# Patient Record
Sex: FEMALE | Race: BLACK OR AFRICAN AMERICAN | NOT HISPANIC OR LATINO
[De-identification: names, ages, dates, MRNs, and addresses within clinical notes are randomized per-mention and may not be internally consistent; named-entity substitution may affect disease eponyms.]

---

## 2018-03-06 PROBLEM — Z00.00 ENCOUNTER FOR PREVENTIVE HEALTH EXAMINATION: Status: ACTIVE | Noted: 2018-03-06

## 2018-04-05 ENCOUNTER — APPOINTMENT (OUTPATIENT)
Dept: OPHTHALMOLOGY | Facility: CLINIC | Age: 83
End: 2018-04-05
Payer: MEDICARE

## 2018-04-05 PROCEDURE — 92025 CPTRIZED CORNEAL TOPOGRAPHY: CPT

## 2018-04-05 PROCEDURE — 92004 COMPRE OPH EXAM NEW PT 1/>: CPT

## 2018-04-05 PROCEDURE — 92286 ANT SGM IMG I&R SPECLR MIC: CPT

## 2018-12-19 ENCOUNTER — APPOINTMENT (OUTPATIENT)
Dept: OPHTHALMOLOGY | Facility: CLINIC | Age: 83
End: 2018-12-19
Payer: MEDICARE

## 2018-12-19 PROCEDURE — 67820 REVISE EYELASHES: CPT | Mod: E1

## 2018-12-19 PROCEDURE — 92012 INTRM OPH EXAM EST PATIENT: CPT | Mod: 25

## 2019-06-19 ENCOUNTER — NON-APPOINTMENT (OUTPATIENT)
Age: 84
End: 2019-06-19

## 2019-06-19 ENCOUNTER — APPOINTMENT (OUTPATIENT)
Dept: OPHTHALMOLOGY | Facility: CLINIC | Age: 84
End: 2019-06-19
Payer: MEDICARE

## 2019-06-19 PROCEDURE — 92014 COMPRE OPH EXAM EST PT 1/>: CPT

## 2020-08-24 ENCOUNTER — INPATIENT (INPATIENT)
Facility: HOSPITAL | Age: 85
LOS: 4 days | Discharge: HOSPICE | DRG: 435 | End: 2020-08-29
Attending: INTERNAL MEDICINE | Admitting: INTERNAL MEDICINE
Payer: MEDICARE

## 2020-08-24 VITALS
WEIGHT: 154.98 LBS | TEMPERATURE: 98 F | HEIGHT: 62 IN | DIASTOLIC BLOOD PRESSURE: 84 MMHG | OXYGEN SATURATION: 98 % | RESPIRATION RATE: 18 BRPM | HEART RATE: 86 BPM | SYSTOLIC BLOOD PRESSURE: 134 MMHG

## 2020-08-24 DIAGNOSIS — R62.7 ADULT FAILURE TO THRIVE: ICD-10-CM

## 2020-08-24 DIAGNOSIS — D49.0 NEOPLASM OF UNSPECIFIED BEHAVIOR OF DIGESTIVE SYSTEM: ICD-10-CM

## 2020-08-24 DIAGNOSIS — E11.9 TYPE 2 DIABETES MELLITUS WITHOUT COMPLICATIONS: ICD-10-CM

## 2020-08-24 DIAGNOSIS — Z90.710 ACQUIRED ABSENCE OF BOTH CERVIX AND UTERUS: Chronic | ICD-10-CM

## 2020-08-24 LAB
ALBUMIN SERPL ELPH-MCNC: 3.9 G/DL — SIGNIFICANT CHANGE UP (ref 3.3–5)
ALP SERPL-CCNC: 49 U/L — SIGNIFICANT CHANGE UP (ref 40–120)
ALT FLD-CCNC: 8 U/L — LOW (ref 10–45)
ANION GAP SERPL CALC-SCNC: 17 MMOL/L — SIGNIFICANT CHANGE UP (ref 5–17)
APTT BLD: 33.4 SEC — SIGNIFICANT CHANGE UP (ref 27.5–35.5)
AST SERPL-CCNC: 16 U/L — SIGNIFICANT CHANGE UP (ref 10–40)
BASOPHILS # BLD AUTO: 0.03 K/UL — SIGNIFICANT CHANGE UP (ref 0–0.2)
BASOPHILS NFR BLD AUTO: 0.3 % — SIGNIFICANT CHANGE UP (ref 0–2)
BILIRUB SERPL-MCNC: 0.6 MG/DL — SIGNIFICANT CHANGE UP (ref 0.2–1.2)
BUN SERPL-MCNC: 15 MG/DL — SIGNIFICANT CHANGE UP (ref 7–23)
CALCIUM SERPL-MCNC: 9.6 MG/DL — SIGNIFICANT CHANGE UP (ref 8.4–10.5)
CHLORIDE SERPL-SCNC: 104 MMOL/L — SIGNIFICANT CHANGE UP (ref 96–108)
CO2 SERPL-SCNC: 23 MMOL/L — SIGNIFICANT CHANGE UP (ref 22–31)
CREAT SERPL-MCNC: 1.6 MG/DL — HIGH (ref 0.5–1.3)
EOSINOPHIL # BLD AUTO: 0.03 K/UL — SIGNIFICANT CHANGE UP (ref 0–0.5)
EOSINOPHIL NFR BLD AUTO: 0.3 % — SIGNIFICANT CHANGE UP (ref 0–6)
GLUCOSE BLDC GLUCOMTR-MCNC: 125 MG/DL — HIGH (ref 70–99)
GLUCOSE SERPL-MCNC: 133 MG/DL — HIGH (ref 70–99)
HCT VFR BLD CALC: 34.7 % — SIGNIFICANT CHANGE UP (ref 34.5–45)
HGB BLD-MCNC: 11.4 G/DL — LOW (ref 11.5–15.5)
IMM GRANULOCYTES NFR BLD AUTO: 0.2 % — SIGNIFICANT CHANGE UP (ref 0–1.5)
INR BLD: 1.05 RATIO — SIGNIFICANT CHANGE UP (ref 0.88–1.16)
LIDOCAIN IGE QN: 16 U/L — SIGNIFICANT CHANGE UP (ref 7–60)
LYMPHOCYTES # BLD AUTO: 1.81 K/UL — SIGNIFICANT CHANGE UP (ref 1–3.3)
LYMPHOCYTES # BLD AUTO: 20.7 % — SIGNIFICANT CHANGE UP (ref 13–44)
MAGNESIUM SERPL-MCNC: 1.9 MG/DL — SIGNIFICANT CHANGE UP (ref 1.6–2.6)
MCHC RBC-ENTMCNC: 31.8 PG — SIGNIFICANT CHANGE UP (ref 27–34)
MCHC RBC-ENTMCNC: 32.9 GM/DL — SIGNIFICANT CHANGE UP (ref 32–36)
MCV RBC AUTO: 96.7 FL — SIGNIFICANT CHANGE UP (ref 80–100)
MONOCYTES # BLD AUTO: 0.61 K/UL — SIGNIFICANT CHANGE UP (ref 0–0.9)
MONOCYTES NFR BLD AUTO: 7 % — SIGNIFICANT CHANGE UP (ref 2–14)
NEUTROPHILS # BLD AUTO: 6.23 K/UL — SIGNIFICANT CHANGE UP (ref 1.8–7.4)
NEUTROPHILS NFR BLD AUTO: 71.5 % — SIGNIFICANT CHANGE UP (ref 43–77)
NRBC # BLD: 0 /100 WBCS — SIGNIFICANT CHANGE UP (ref 0–0)
PHOSPHATE SERPL-MCNC: 3.1 MG/DL — SIGNIFICANT CHANGE UP (ref 2.5–4.5)
PLATELET # BLD AUTO: 225 K/UL — SIGNIFICANT CHANGE UP (ref 150–400)
POTASSIUM SERPL-MCNC: 3.4 MMOL/L — LOW (ref 3.5–5.3)
POTASSIUM SERPL-SCNC: 3.4 MMOL/L — LOW (ref 3.5–5.3)
PROT SERPL-MCNC: 6.1 G/DL — SIGNIFICANT CHANGE UP (ref 6–8.3)
PROTHROM AB SERPL-ACNC: 12.5 SEC — SIGNIFICANT CHANGE UP (ref 10.6–13.6)
RBC # BLD: 3.59 M/UL — LOW (ref 3.8–5.2)
RBC # FLD: 13.2 % — SIGNIFICANT CHANGE UP (ref 10.3–14.5)
SARS-COV-2 RNA SPEC QL NAA+PROBE: SIGNIFICANT CHANGE UP
SODIUM SERPL-SCNC: 144 MMOL/L — SIGNIFICANT CHANGE UP (ref 135–145)
WBC # BLD: 8.73 K/UL — SIGNIFICANT CHANGE UP (ref 3.8–10.5)
WBC # FLD AUTO: 8.73 K/UL — SIGNIFICANT CHANGE UP (ref 3.8–10.5)

## 2020-08-24 PROCEDURE — 93010 ELECTROCARDIOGRAM REPORT: CPT

## 2020-08-24 PROCEDURE — 99285 EMERGENCY DEPT VISIT HI MDM: CPT

## 2020-08-24 RX ORDER — LEVOTHYROXINE SODIUM 125 MCG
50 TABLET ORAL DAILY
Refills: 0 | Status: DISCONTINUED | OUTPATIENT
Start: 2020-08-24 | End: 2020-08-29

## 2020-08-24 RX ORDER — INSULIN LISPRO 100/ML
VIAL (ML) SUBCUTANEOUS
Refills: 0 | Status: DISCONTINUED | OUTPATIENT
Start: 2020-08-24 | End: 2020-08-29

## 2020-08-24 RX ORDER — GLUCAGON INJECTION, SOLUTION 0.5 MG/.1ML
1 INJECTION, SOLUTION SUBCUTANEOUS ONCE
Refills: 0 | Status: DISCONTINUED | OUTPATIENT
Start: 2020-08-24 | End: 2020-08-29

## 2020-08-24 RX ORDER — LOSARTAN POTASSIUM 100 MG/1
50 TABLET, FILM COATED ORAL DAILY
Refills: 0 | Status: DISCONTINUED | OUTPATIENT
Start: 2020-08-24 | End: 2020-08-29

## 2020-08-24 RX ORDER — SENNA PLUS 8.6 MG/1
2 TABLET ORAL AT BEDTIME
Refills: 0 | Status: DISCONTINUED | OUTPATIENT
Start: 2020-08-24 | End: 2020-08-29

## 2020-08-24 RX ORDER — DEXTROSE 50 % IN WATER 50 %
25 SYRINGE (ML) INTRAVENOUS ONCE
Refills: 0 | Status: DISCONTINUED | OUTPATIENT
Start: 2020-08-24 | End: 2020-08-29

## 2020-08-24 RX ORDER — SODIUM CHLORIDE 9 MG/ML
1000 INJECTION, SOLUTION INTRAVENOUS
Refills: 0 | Status: DISCONTINUED | OUTPATIENT
Start: 2020-08-24 | End: 2020-08-29

## 2020-08-24 RX ORDER — DILTIAZEM HCL 120 MG
240 CAPSULE, EXT RELEASE 24 HR ORAL DAILY
Refills: 0 | Status: DISCONTINUED | OUTPATIENT
Start: 2020-08-24 | End: 2020-08-29

## 2020-08-24 RX ORDER — DEXTROSE 50 % IN WATER 50 %
15 SYRINGE (ML) INTRAVENOUS ONCE
Refills: 0 | Status: DISCONTINUED | OUTPATIENT
Start: 2020-08-24 | End: 2020-08-29

## 2020-08-24 RX ORDER — HEPARIN SODIUM 5000 [USP'U]/ML
5000 INJECTION INTRAVENOUS; SUBCUTANEOUS EVERY 12 HOURS
Refills: 0 | Status: DISCONTINUED | OUTPATIENT
Start: 2020-08-24 | End: 2020-08-29

## 2020-08-24 RX ORDER — DEXTROSE 50 % IN WATER 50 %
12.5 SYRINGE (ML) INTRAVENOUS ONCE
Refills: 0 | Status: DISCONTINUED | OUTPATIENT
Start: 2020-08-24 | End: 2020-08-29

## 2020-08-24 NOTE — ED PROVIDER NOTE - ATTENDING CONTRIBUTION TO CARE
CLAUDINE I reviewed ACP note above. Patient presents with decreased PO intake and weight loss. Oupt US concerning for pancreatic lesion. Check Labs, CT to eval for malignancy. IVF and re eval.

## 2020-08-24 NOTE — ED ADULT NURSE NOTE - SUICIDE SCREENING QUESTION 2
Pt AOx4, ready to go home. Pt had a bout of shortness of breath this am but was managed w/breathing treatment and rest. Pt had been up to the bathroom about 5 times because of lasix and it was believed the she had over exerted herself. Respiratory came in, VS checked (wnl), doctor notified. Pt was feeling better within a half hour. Appts were made for the patient for follow up w/Dr. Mccall in [place], and through [facility].    No

## 2020-08-24 NOTE — ED PROVIDER NOTE - PHYSICAL EXAMINATION
A&Ox3, NAD. NCAT. PERRL, EOMI. Neck supple, no LAD. Lungs CTAB. +S1S2, RRR, No m/r/g. Abd soft, NT/ND, +BS, no rebound or guarding. Extremities: cap refill <2, pulses in distal extremities 4+, no edema. Skin without rash. CN II-XII intact. Strength 5/5 UE/LE. Sensations intact throughout.

## 2020-08-24 NOTE — CHART NOTE - NSCHARTNOTEFT_GEN_A_CORE
Medicine NP note    Call received from Dr Wolf to order MR abdomen with Iv contrast for patient to R/O Pancreatic neoplasm  Cr 1.6, Spoke with Radiologist, Cr 1.6, Mr abdomen with iv contrast ordered   F/U results  Will sign out to day team    Rochelle Springer [facility]  [id number]

## 2020-08-24 NOTE — ED ADULT TRIAGE NOTE - CHIEF COMPLAINT QUOTE
c/o decreased PO intake, n/v last 3 weeks. last BM this morning denies abd pain. lost 25lbs in 3 weeks

## 2020-08-24 NOTE — PATIENT PROFILE ADULT - NSTOBACCONEVERSMOKERY/N_GEN_A
Forwarded to Dr. Nichelle Canales.  
Patient would like to speak to Dr. Canales regarding the neurology  consult.  Patient did not elaborate.  
Talked to Ashley  She is seeing Dr Quintero 5/8/18 and was wondering if that was too late. She has no acute changes and I think she should be fine until then.  
No

## 2020-08-24 NOTE — ED PROVIDER NOTE - PROGRESS NOTE DETAILS
spoke with pts PCP Dr. Génesis Krause who states he was concerned for possible neoplasm of liver/pancreas. pt with wt loss of 40 lbs and not eating, was scheduled for abd MRI today however per pts son pt was too weak to make appointment. -Reina Rios PA-C Attending note (Tristan): patient found to have findings concerning for pancreatic neoplasm on CT; given new malignancy, poor po intake, will need admission for further medical management.

## 2020-08-24 NOTE — ED PROVIDER NOTE - OBJECTIVE STATEMENT
89 yo female with pmg htn, hypothyroidism, DM, presenting for concern of wt loss and decreased appetite x 4 weeks. Pt had out pt abdominal US with PMD Dr. Génesis Krause which was significant for hepatic and pancreatic lesions, was scheduled for abd MRI today however was too weak to make the appointment. Pt states she is only able to tolerate some water but no other food/liquid, has been generally constipated however had normal BM yesterday, denies abdominal pain, nausea or vomiting and is generally weak. no chest pain shortness of breath or bloody stools, no hx of EtOH or smoking.

## 2020-08-24 NOTE — ED ADULT NURSE NOTE - OBJECTIVE STATEMENT
89 yo f pmhx of HTN, HLD presents to the ED with c/o decreased po intake as well as feeling nauseous. PT reports for the past 4 weeks she has been experiencing decreased po intake, reports feeling weak, states decreased appetite, states she doesn't feel like eating, lost 25 pounds within the past 4 weeks. Reports she was supposed to get an MRI done but didn't go because she feels weak. Pt is A&Ox4, lung sound clear abd soft and non distended. Pt denies headache, dizziness, chest pain, palpitations, cough, SOB, abdominal pain, n/v/d, urinary symptoms, fevers, chills.

## 2020-08-24 NOTE — ED ADULT NURSE NOTE - NSIMPLEMENTINTERV_GEN_ALL_ED
Implemented All Fall Risk Interventions:  [place] to call system. Call bell, personal items and telephone within reach. Instruct patient to call for assistance. Room bathroom lighting operational. Non-slip footwear when patient is off stretcher. Physically safe environment: no spills, clutter or unnecessary equipment. Stretcher in lowest position, wheels locked, appropriate side rails in place. Provide visual cue, wrist band, yellow gown, etc. Monitor gait and stability. Monitor for mental status changes and reorient to person, place, and time. Review medications for side effects contributing to fall risk. Reinforce activity limits and safety measures with patient and family.

## 2020-08-25 LAB
A1C WITH ESTIMATED AVERAGE GLUCOSE RESULT: 12.6 % — HIGH (ref 4–5.6)
ALBUMIN SERPL ELPH-MCNC: 3.6 G/DL — SIGNIFICANT CHANGE UP (ref 3.3–5)
ALP SERPL-CCNC: 44 U/L — SIGNIFICANT CHANGE UP (ref 40–120)
ALT FLD-CCNC: 7 U/L — LOW (ref 10–45)
ANION GAP SERPL CALC-SCNC: 17 MMOL/L — SIGNIFICANT CHANGE UP (ref 5–17)
AST SERPL-CCNC: 15 U/L — SIGNIFICANT CHANGE UP (ref 10–40)
BILIRUB SERPL-MCNC: 0.5 MG/DL — SIGNIFICANT CHANGE UP (ref 0.2–1.2)
BUN SERPL-MCNC: 17 MG/DL — SIGNIFICANT CHANGE UP (ref 7–23)
CALCIUM SERPL-MCNC: 9.3 MG/DL — SIGNIFICANT CHANGE UP (ref 8.4–10.5)
CHLORIDE SERPL-SCNC: 105 MMOL/L — SIGNIFICANT CHANGE UP (ref 96–108)
CO2 SERPL-SCNC: 23 MMOL/L — SIGNIFICANT CHANGE UP (ref 22–31)
CREAT SERPL-MCNC: 1.54 MG/DL — HIGH (ref 0.5–1.3)
ESTIMATED AVERAGE GLUCOSE: 315 MG/DL — HIGH (ref 68–114)
GLUCOSE BLDC GLUCOMTR-MCNC: 111 MG/DL — HIGH (ref 70–99)
GLUCOSE BLDC GLUCOMTR-MCNC: 117 MG/DL — HIGH (ref 70–99)
GLUCOSE BLDC GLUCOMTR-MCNC: 122 MG/DL — HIGH (ref 70–99)
GLUCOSE BLDC GLUCOMTR-MCNC: 143 MG/DL — HIGH (ref 70–99)
GLUCOSE SERPL-MCNC: 130 MG/DL — HIGH (ref 70–99)
HCT VFR BLD CALC: 33.7 % — LOW (ref 34.5–45)
HGB BLD-MCNC: 11.1 G/DL — LOW (ref 11.5–15.5)
MCHC RBC-ENTMCNC: 32 PG — SIGNIFICANT CHANGE UP (ref 27–34)
MCHC RBC-ENTMCNC: 32.9 GM/DL — SIGNIFICANT CHANGE UP (ref 32–36)
MCV RBC AUTO: 97.1 FL — SIGNIFICANT CHANGE UP (ref 80–100)
NRBC # BLD: 0 /100 WBCS — SIGNIFICANT CHANGE UP (ref 0–0)
PLATELET # BLD AUTO: 187 K/UL — SIGNIFICANT CHANGE UP (ref 150–400)
POTASSIUM SERPL-MCNC: 2.8 MMOL/L — CRITICAL LOW (ref 3.5–5.3)
POTASSIUM SERPL-SCNC: 2.8 MMOL/L — CRITICAL LOW (ref 3.5–5.3)
PROT SERPL-MCNC: 5.6 G/DL — LOW (ref 6–8.3)
RBC # BLD: 3.47 M/UL — LOW (ref 3.8–5.2)
RBC # FLD: 13.2 % — SIGNIFICANT CHANGE UP (ref 10.3–14.5)
SODIUM SERPL-SCNC: 145 MMOL/L — SIGNIFICANT CHANGE UP (ref 135–145)
WBC # BLD: 7.76 K/UL — SIGNIFICANT CHANGE UP (ref 3.8–10.5)
WBC # FLD AUTO: 7.76 K/UL — SIGNIFICANT CHANGE UP (ref 3.8–10.5)

## 2020-08-25 PROCEDURE — 99222 1ST HOSP IP/OBS MODERATE 55: CPT | Mod: GC

## 2020-08-25 PROCEDURE — 74183 MRI ABD W/O CNTR FLWD CNTR: CPT | Mod: 26

## 2020-08-25 RX ORDER — POTASSIUM CHLORIDE 20 MEQ
40 PACKET (EA) ORAL ONCE
Refills: 0 | Status: COMPLETED | OUTPATIENT
Start: 2020-08-25 | End: 2020-08-25

## 2020-08-25 RX ADMIN — LOSARTAN POTASSIUM 50 MILLIGRAM(S): 100 TABLET, FILM COATED ORAL at 06:33

## 2020-08-25 RX ADMIN — HEPARIN SODIUM 5000 UNIT(S): 5000 INJECTION INTRAVENOUS; SUBCUTANEOUS at 05:58

## 2020-08-25 RX ADMIN — Medication 240 MILLIGRAM(S): at 05:58

## 2020-08-25 RX ADMIN — Medication 40 MILLIEQUIVALENT(S): at 08:53

## 2020-08-25 RX ADMIN — Medication 50 MICROGRAM(S): at 05:58

## 2020-08-25 RX ADMIN — HEPARIN SODIUM 5000 UNIT(S): 5000 INJECTION INTRAVENOUS; SUBCUTANEOUS at 18:42

## 2020-08-25 RX ADMIN — SENNA PLUS 2 TABLET(S): 8.6 TABLET ORAL at 21:42

## 2020-08-25 NOTE — CONSULT NOTE ADULT - SUBJECTIVE AND OBJECTIVE BOX
Chief Complaint: Weakness    HPI:    89 y/o F w/ hx of DM, HTN, hypothyroidism, and reported weight loss associated with decreased appetite, with pancreatic and hepatic lesions noted on outpatient abdominal U/S, who presented to [facility] for weawkness, with CT A/P with read of pancreatic tail mass; Advanced Endoscopy consulted for evaluation.    The patient endorses weight loss over the past several months along with loss of appetite. She feels a little better, however, continues to feel weak. She denies nausea/vomiting and abdominal pain. She denies yellowing of the skin and eyes.    Allergies:  No Known Allergies  Home Medications:  dilTIAZem 240 mg/24 hours oral capsule, extended release: 1 cap(s) orally once a day  glipiZIDE 2.5 mg oral tablet, extended release: 1 tab(s) orally once a day  losartan-hydrochlorothiazide 100 mg-25 mg oral tablet: 1 tab(s) orally once a day  metFORMIN 500 mg oral tablet: 1 tab(s) orally 2 times a day  ocular lubricant ophthalmic solution: 1 drop(s) to each affected eye 4 times a day, As Needed  Senexon-S 50 mg-8.6 mg oral tablet: 1 tab(s) orally 2 times a day  Synthroid 50 mcg (0.05 mg) oral tablet: 1 tab(s) orally once a day  Vitamin B-12 1000 mcg oral tablet: 1 tab(s) orally once a day    Hospital Medications:  artificial tears (preservative free) Ophthalmic Solution 1 Drop(s) Both EYES four times a day PRN  dextrose 40% Gel 15 Gram(s) Oral once PRN  dextrose 5%. 1000 milliLiter(s) IV Continuous <Continuous>  dextrose 50% Injectable 12.5 Gram(s) IV Push once  dextrose 50% Injectable 25 Gram(s) IV Push once  dextrose 50% Injectable 25 Gram(s) IV Push once  diltiazem    milliGRAM(s) Oral daily  glucagon  Injectable 1 milliGRAM(s) IntraMuscular once PRN  heparin   Injectable 5000 Unit(s) SubCutaneous every 12 hours  insulin lispro (HumaLOG) corrective regimen sliding scale   SubCutaneous three times a day before meals  levothyroxine 50 MICROGram(s) Oral daily  losartan 50 milliGRAM(s) Oral daily  senna 2 Tablet(s) Oral at bedtime    Allergies:   No Known Allergies    PMHX/PSHX:      Family history:      Denies family history of colon cancer/polyps, stomach cancer/polyps, pancreatic cancer/masses, liver cancer/disease, ovarian cancer and endometrial cancer.    Social History:     Tob: Denies  EtOH: Denies  Illicit Drugs: Denies    ROS:     General:  No wt loss, fevers, chills, night sweats, + fatigue  Eyes:  Good vision, no reported pain  ENT:  No sore throat, pain, runny nose, dysphagia  CV:  No pain, palpitations, hypo/hypertension  Pulm:  No dyspnea, cough, tachypnea, wheezing  GI:  No pain, No nausea, No vomiting, No diarrhea, No constipation, No weight loss, No fever, No pruritis, No bright red blood per rectum, No tarry stools, No dysphagia,  :  No pain, bleeding, incontinence, nocturia  Muscle:  No pain, weakness  Neuro:  No weakness, tingling, memory problems  Psych:  No fatigue, insomnia, mood problems, depression  Endocrine:  No polyuria, polydipsia, cold/heat intolerance  Heme:  No petechiae, ecchymosis, easy bruisability  Skin:  No rash, tattoos, scars, edema    PHYSICAL EXAM:     Vital Signs:  Vital Signs Last 24 Hrs  T(C): 36.9 (25 Aug 2020 04:27), Max: 37.2 (24 Aug 2020 21:33)  T(F): 98.5 (25 Aug 2020 04:27), Max: 98.9 (24 Aug 2020 21:33)  HR: 50 (25 Aug 2020 04:27) (50 - 87)  BP: 123/55 (25 Aug 2020 04:27) (123/55 - 158/78)  BP(mean): --  RR: 18 (25 Aug 2020 04:27) (16 - 18)  SpO2: 99% (25 Aug 2020 04:27) (97% - 100%)  Daily Height in cm: 154.94 (24 Aug 2020 20:25)    Daily Weight in k.8 (25 Aug 2020 07:36)    GENERAL:  No acute distress  HEENT:  Normocephalic/atraumatic, no scleral icterus  CHEST:  Clear to auscultation bilaterally, no wheezes/rales/ronchi, no accessory muscle use  HEART:  Regular rate and rhythm, no murmurs/rubs/gallops  ABDOMEN:  Soft, non-tender, non-distended, normoactive bowel sounds  EXTREMITIES: No cyanosis, clubbing, or edema  SKIN:  No rash/erythema  NEURO:  Alert and oriented x 3    LABS:                        11.1   7.76  )-----------( 187      ( 25 Aug 2020 06:44 )             33.7     Mean Cell Volume: 97.1 fl (-20 @ 06:44)        145  |  105  |  17  ----------------------------<  130<H>  2.8<LL>   |  23  |  1.54<H>    Ca    9.3      25 Aug 2020 06:44  Phos  3.1     08  Mg     1.9         TPro  5.6<L>  /  Alb  3.6  /  TBili  0.5  /  DBili  x   /  AST  15  /  ALT  7<L>  /  AlkPhos  44  25    LIVER FUNCTIONS - ( 25 Aug 2020 06:44 )  Alb: 3.6 g/dL / Pro: 5.6 g/dL / ALK PHOS: 44 U/L / ALT: 7 U/L / AST: 15 U/L / GGT: x           PT/INR - ( 24 Aug 2020 16:24 )   PT: 12.5 sec;   INR: 1.05 ratio         PTT - ( 24 Aug 2020 16:24 )  PTT:33.4 sec    Amylase Serum--      Lipase serum16       Ammonia--                          11.1   7.76  )-----------( 187      ( 25 Aug 2020 06:44 )             33.7                         11.4   8.73  )-----------( 225      ( 24 Aug 2020 16:24 )             34.7     Imaging:    < from: CT Abdomen and Pelvis No Cont (20 @ 17:28) >    EXAM:  CT ABDOMEN AND PELVIS                            PROCEDURE DATE:  2020            INTERPRETATION:  CLINICAL INFORMATION: Unintended weight loss with nonlocalized abdominal pain    COMPARISON: None.    PROCEDURE:  CT of the Abdomen and Pelvis was performed without intravenous contrast.  Intravenous contrast: None.  Oral contrast: None.  Sagittal and coronal reformats were performed.    FINDINGS:  LOWER CHEST: Coronary artery calcified dictation and/or stents. Trace bibasilar subsegmental atelectasis.    LIVER: Within normal limits.  BILE DUCTS: Normal caliber.  GALLBLADDER: Within normal limits.  SPLEEN: Within normal limits.  PANCREAS: There is soft tissue mass in the proximal pancreatic tail measuring 1.8 cm in AP dimension with upstream ductal dilatation and parenchymal atrophy. No obvious celiac or SMA involvement. The mass is remote from the portal confluence.  ADRENALS: Within normal limits.  KIDNEYS/URETERS: Right upper pole renal cyst. No stones or hydronephrosis. Subcentimeter left renal lesion too small to characterize.    BLADDER: Within normal limits.  REPRODUCTIVE ORGANS: Hysterectomy.    BOWEL: Colonic diverticulosis. Small hiatal hernia. No bowel obstruction. Appendix is normal.  PERITONEUM: No ascites.  VESSELS: Atherosclerotic changes.  RETROPERITONEUM/LYMPH NODES: No lymphadenopathy.  ABDOMINAL WALL: Within normal limits.  BONES: Degenerative changes.    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              MICHAEL SMILEY M.D., RESIDENT RADIOLOGIST  This document has been electronically signed.  SANAM TY M.D., ATTENDING RADIOLOGIST  This document has been electronically signed. Aug 24 2020  5:54PM                < end of copied text >

## 2020-08-25 NOTE — CONSULT NOTE ADULT - ASSESSMENT
Impression:    89 y/o F w/ hx of DM, HTN, hypothyroidism, found to have pancreatic tail mass    # Pancreatic tail mass: Concern for malignancy in setting of weight loss and with associated PD dilatation    Recommendations  - Tentatively plan for EUS/FNA tomorrow  - NPO after midnight  - Rest of care per primary team    Jean Paul Alas MD  Gastroenterology Fellow  Please contact via [employer] Teams    Please call GI ([phone]) or e-mail [email or web address] if there are any additional questions or concerns, during weekdays from 8 am to 5 pm.     Please call answering service for on-call GI fellow ([phone]) after 5pm and before 8am, and on weekends. Impression:    89 y/o F w/ hx of DM, HTN, hypothyroidism, found to have pancreatic tail mass    # Pancreatic tail mass: Concern for malignancy in setting of weight loss and with associated PD dilatation    Recommendations  - GOC discussion with patient and patient's son per primary team   - F/U MRI/MRCP  - Rest of care per primary team    Jean Paul Alas MD  Gastroenterology Fellow  Please contact via Microsoft Teams    Please call GI ([phone]) or e-mail [email or web address] if there are any additional questions or concerns, during weekdays from 8 am to 5 pm.     Please call answering service for on-call GI fellow ([phone]) after 5pm and before 8am, and on weekends.

## 2020-08-25 NOTE — CONSULT NOTE ADULT - ATTENDING COMMENTS
89yo F, functional, DM, HTN, hypothyroidism, presented with weight loss/ decreased appetite and found to have pancreatic tail mass on CT.  Patient unclear at this point if she is interested in treatment should this lesion be malignant.      Recs  Can proceed with diagnostic procedure (EUS/FNA) if patient interested in treatment - will wait for patient decision prior to scheduling.  Further care per primary team.    Thank you for this interesting consult.  Please call the advanced GI service with any questions or concerns.

## 2020-08-26 LAB
ANION GAP SERPL CALC-SCNC: 18 MMOL/L — HIGH (ref 5–17)
BUN SERPL-MCNC: 17 MG/DL — SIGNIFICANT CHANGE UP (ref 7–23)
CALCIUM SERPL-MCNC: 9.4 MG/DL — SIGNIFICANT CHANGE UP (ref 8.4–10.5)
CANCER AG125 SERPL-ACNC: 26 U/ML — SIGNIFICANT CHANGE UP
CANCER AG19-9 SERPL-ACNC: 209 U/ML — HIGH
CHLORIDE SERPL-SCNC: 104 MMOL/L — SIGNIFICANT CHANGE UP (ref 96–108)
CO2 SERPL-SCNC: 20 MMOL/L — LOW (ref 22–31)
CREAT SERPL-MCNC: 1.37 MG/DL — HIGH (ref 0.5–1.3)
GLUCOSE BLDC GLUCOMTR-MCNC: 115 MG/DL — HIGH (ref 70–99)
GLUCOSE BLDC GLUCOMTR-MCNC: 125 MG/DL — HIGH (ref 70–99)
GLUCOSE BLDC GLUCOMTR-MCNC: 129 MG/DL — HIGH (ref 70–99)
GLUCOSE BLDC GLUCOMTR-MCNC: 129 MG/DL — HIGH (ref 70–99)
GLUCOSE SERPL-MCNC: 119 MG/DL — HIGH (ref 70–99)
HCT VFR BLD CALC: 33.6 % — LOW (ref 34.5–45)
HGB BLD-MCNC: 10.8 G/DL — LOW (ref 11.5–15.5)
MAGNESIUM SERPL-MCNC: 1.9 MG/DL — SIGNIFICANT CHANGE UP (ref 1.6–2.6)
MCHC RBC-ENTMCNC: 31.3 PG — SIGNIFICANT CHANGE UP (ref 27–34)
MCHC RBC-ENTMCNC: 32.1 GM/DL — SIGNIFICANT CHANGE UP (ref 32–36)
MCV RBC AUTO: 97.4 FL — SIGNIFICANT CHANGE UP (ref 80–100)
NRBC # BLD: 0 /100 WBCS — SIGNIFICANT CHANGE UP (ref 0–0)
PHOSPHATE SERPL-MCNC: 3.4 MG/DL — SIGNIFICANT CHANGE UP (ref 2.5–4.5)
PLATELET # BLD AUTO: 195 K/UL — SIGNIFICANT CHANGE UP (ref 150–400)
POTASSIUM SERPL-MCNC: 3.3 MMOL/L — LOW (ref 3.5–5.3)
POTASSIUM SERPL-SCNC: 3.3 MMOL/L — LOW (ref 3.5–5.3)
RBC # BLD: 3.45 M/UL — LOW (ref 3.8–5.2)
RBC # FLD: 13.2 % — SIGNIFICANT CHANGE UP (ref 10.3–14.5)
SODIUM SERPL-SCNC: 142 MMOL/L — SIGNIFICANT CHANGE UP (ref 135–145)
WBC # BLD: 7.36 K/UL — SIGNIFICANT CHANGE UP (ref 3.8–10.5)
WBC # FLD AUTO: 7.36 K/UL — SIGNIFICANT CHANGE UP (ref 3.8–10.5)

## 2020-08-26 PROCEDURE — 99232 SBSQ HOSP IP/OBS MODERATE 35: CPT | Mod: GC

## 2020-08-26 RX ORDER — POTASSIUM CHLORIDE 20 MEQ
40 PACKET (EA) ORAL ONCE
Refills: 0 | Status: COMPLETED | OUTPATIENT
Start: 2020-08-26 | End: 2020-08-26

## 2020-08-26 RX ORDER — SODIUM CHLORIDE 9 MG/ML
1000 INJECTION INTRAMUSCULAR; INTRAVENOUS; SUBCUTANEOUS
Refills: 0 | Status: DISCONTINUED | OUTPATIENT
Start: 2020-08-26 | End: 2020-08-29

## 2020-08-26 RX ADMIN — HEPARIN SODIUM 5000 UNIT(S): 5000 INJECTION INTRAVENOUS; SUBCUTANEOUS at 17:03

## 2020-08-26 RX ADMIN — HEPARIN SODIUM 5000 UNIT(S): 5000 INJECTION INTRAVENOUS; SUBCUTANEOUS at 05:42

## 2020-08-26 RX ADMIN — Medication 240 MILLIGRAM(S): at 05:45

## 2020-08-26 RX ADMIN — SENNA PLUS 2 TABLET(S): 8.6 TABLET ORAL at 21:23

## 2020-08-26 RX ADMIN — LOSARTAN POTASSIUM 50 MILLIGRAM(S): 100 TABLET, FILM COATED ORAL at 05:45

## 2020-08-26 RX ADMIN — Medication 40 MILLIEQUIVALENT(S): at 13:34

## 2020-08-26 RX ADMIN — SODIUM CHLORIDE 65 MILLILITER(S): 9 INJECTION INTRAMUSCULAR; INTRAVENOUS; SUBCUTANEOUS at 17:07

## 2020-08-26 RX ADMIN — Medication 50 MICROGRAM(S): at 05:45

## 2020-08-26 NOTE — PHYSICAL THERAPY INITIAL EVALUATION ADULT - PERTINENT HX OF CURRENT PROBLEM, REHAB EVAL
89y old Female who presents with a chief complaint of Weakness. Hx of DM, HTN, hypothyroidism, found to have pancreatic tail mass. Chest Xray (clear lungs).

## 2020-08-26 NOTE — PHYSICAL THERAPY INITIAL EVALUATION ADULT - PLANNED THERAPY INTERVENTIONS, PT EVAL
Goal: Pt will be able to negotiate one flight of stairs independently with single handrail and step over step pattern in 3 weeks./gait training/strengthening/transfer training/balance training/bed mobility training

## 2020-08-26 NOTE — PHYSICAL THERAPY INITIAL EVALUATION ADULT - ADDITIONAL COMMENTS
Per pt, she lives alone in a  with 4-5 steps to enter (+R rail up) and has a flight of stairs down to basement when she needs to do laundry about 1x/week (+L rail down). R handed, +reading glasses, hearing good.

## 2020-08-26 NOTE — PROGRESS NOTE ADULT - ASSESSMENT
Impression:    89 y/o F w/ hx of DM, HTN, hypothyroidism, found to have pancreatic tail mass    # Pancreatic tail mass: Concern for malignancy in setting of weight loss and with associated PD dilatation    Recommendations  - GOC discussion with patient and patient's son per primary team   - F/U MRI/MRCP  - If consistent with GOC, can plan for EUS-FNB for diagnosis  - Rest of care per primary team    Malissa Melgar MD  Gastroenterology Fellow  [phone]  Available on Microsoft Teams  Please page on call fellow on weekends and after 5pm on weekdays: [phone]

## 2020-08-26 NOTE — PHYSICAL THERAPY INITIAL EVALUATION ADULT - ACTIVE RANGE OF MOTION EXAMINATION, REHAB EVAL
fred. upper extremity Active ROM was WNL (within normal limits)/bilateral lower extremity Active ROM was WNL (within normal limits)

## 2020-08-27 ENCOUNTER — RESULT REVIEW (OUTPATIENT)
Age: 85
End: 2020-08-27

## 2020-08-27 LAB
ANION GAP SERPL CALC-SCNC: 16 MMOL/L — SIGNIFICANT CHANGE UP (ref 5–17)
BUN SERPL-MCNC: 13 MG/DL — SIGNIFICANT CHANGE UP (ref 7–23)
CALCIUM SERPL-MCNC: 9.2 MG/DL — SIGNIFICANT CHANGE UP (ref 8.4–10.5)
CHLORIDE SERPL-SCNC: 106 MMOL/L — SIGNIFICANT CHANGE UP (ref 96–108)
CO2 SERPL-SCNC: 22 MMOL/L — SIGNIFICANT CHANGE UP (ref 22–31)
CREAT SERPL-MCNC: 0.95 MG/DL — SIGNIFICANT CHANGE UP (ref 0.5–1.3)
GLUCOSE BLDC GLUCOMTR-MCNC: 112 MG/DL — HIGH (ref 70–99)
GLUCOSE BLDC GLUCOMTR-MCNC: 129 MG/DL — HIGH (ref 70–99)
GLUCOSE BLDC GLUCOMTR-MCNC: 135 MG/DL — HIGH (ref 70–99)
GLUCOSE BLDC GLUCOMTR-MCNC: 167 MG/DL — HIGH (ref 70–99)
GLUCOSE SERPL-MCNC: 137 MG/DL — HIGH (ref 70–99)
HCT VFR BLD CALC: 34.8 % — SIGNIFICANT CHANGE UP (ref 34.5–45)
HGB BLD-MCNC: 11.2 G/DL — LOW (ref 11.5–15.5)
MAGNESIUM SERPL-MCNC: 1.7 MG/DL — SIGNIFICANT CHANGE UP (ref 1.6–2.6)
MCHC RBC-ENTMCNC: 31.5 PG — SIGNIFICANT CHANGE UP (ref 27–34)
MCHC RBC-ENTMCNC: 32.2 GM/DL — SIGNIFICANT CHANGE UP (ref 32–36)
MCV RBC AUTO: 98 FL — SIGNIFICANT CHANGE UP (ref 80–100)
NRBC # BLD: 0 /100 WBCS — SIGNIFICANT CHANGE UP (ref 0–0)
PLATELET # BLD AUTO: 180 K/UL — SIGNIFICANT CHANGE UP (ref 150–400)
POTASSIUM SERPL-MCNC: 3.5 MMOL/L — SIGNIFICANT CHANGE UP (ref 3.5–5.3)
POTASSIUM SERPL-SCNC: 3.5 MMOL/L — SIGNIFICANT CHANGE UP (ref 3.5–5.3)
RBC # BLD: 3.55 M/UL — LOW (ref 3.8–5.2)
RBC # FLD: 13 % — SIGNIFICANT CHANGE UP (ref 10.3–14.5)
SODIUM SERPL-SCNC: 144 MMOL/L — SIGNIFICANT CHANGE UP (ref 135–145)
WBC # BLD: 6.22 K/UL — SIGNIFICANT CHANGE UP (ref 3.8–10.5)
WBC # FLD AUTO: 6.22 K/UL — SIGNIFICANT CHANGE UP (ref 3.8–10.5)

## 2020-08-27 PROCEDURE — 88305 TISSUE EXAM BY PATHOLOGIST: CPT | Mod: 26

## 2020-08-27 PROCEDURE — 88173 CYTOPATH EVAL FNA REPORT: CPT | Mod: 26

## 2020-08-27 PROCEDURE — 43242 EGD US FINE NEEDLE BX/ASPIR: CPT | Mod: GC

## 2020-08-27 RX ADMIN — SODIUM CHLORIDE 65 MILLILITER(S): 9 INJECTION INTRAMUSCULAR; INTRAVENOUS; SUBCUTANEOUS at 05:38

## 2020-08-27 RX ADMIN — Medication 50 MICROGRAM(S): at 05:35

## 2020-08-27 RX ADMIN — HEPARIN SODIUM 5000 UNIT(S): 5000 INJECTION INTRAVENOUS; SUBCUTANEOUS at 17:05

## 2020-08-27 RX ADMIN — Medication 240 MILLIGRAM(S): at 05:35

## 2020-08-27 RX ADMIN — LOSARTAN POTASSIUM 50 MILLIGRAM(S): 100 TABLET, FILM COATED ORAL at 05:36

## 2020-08-27 NOTE — DIETITIAN INITIAL EVALUATION ADULT. - PHYSICAL APPEARANCE
unable to observe pt/other (specify) Height: 61 inches, Weight: 144.4 pounds (dosing wt)  BMI: 27.3 kg/m2 IBW: 105 pounds (+/-10%), %IBW: 137%  Pertinent Info: No edema noted, no pressure injuries noted at this time in nursing flow sheet.

## 2020-08-27 NOTE — DIETITIAN INITIAL EVALUATION ADULT. - ETIOLOGY
decreased appetite in setting of GI dysfunction, pancreatic mass endocrine dysfunction, DM and pancreatic mass

## 2020-08-27 NOTE — DIETITIAN INITIAL EVALUATION ADULT. - OTHER INFO
Pt is 89yoF with PMHx significant for HTN, hypothyroidism, DM, presenting c/o wt loss and decreased appetite x 4 weeks, hepatic and pancreatic lesions on outpatient abdominal US. Pt found with pancreatic tail mass, EGD/EUS pending today. Palliative consult pending.     Limited subjective information available as pt off unit undergoing testing.   Therapeutic Diet PTA: unable to obtain  Pt with h/o DM, noted as taking metformin and glipizide for BG management PTA per H&P. Last HgbA1c 12.6% indicative of inadequate BG control PTA.  Nutrition Supplements PTA: vitamin B12 per H&P  NKFA reported.    Pt UBW: unable to obtain. Pt dosing wt noted as 144.4 lbs. Noted with wt loss PTA per H&P, however unknown quantity.    Pt currently with clear liquids diet order since admission, x4 days.  Pt with no noted chewing/swallowing difficulties per chart at this time.  Pt noted as c/o abdominal pain yesterday per chart.

## 2020-08-27 NOTE — PRE PROCEDURE NOTE - PRE PROCEDURE EVALUATION
Attending Physician:   Norbert                         Procedure: EGD/EUS    Indication for Procedure: Pancreatic mass  ________________________________________________________  PAST MEDICAL & SURGICAL HISTORY:  HTN (hypertension)  H/O: hysterectomy    ALLERGIES:  No Known Allergies    HOME MEDICATIONS:  dilTIAZem 240 mg/24 hours oral capsule, extended release: 1 cap(s) orally once a day  glipiZIDE 2.5 mg oral tablet, extended release: 1 tab(s) orally once a day  losartan-hydrochlorothiazide 100 mg-25 mg oral tablet: 1 tab(s) orally once a day  metFORMIN 500 mg oral tablet: 1 tab(s) orally 2 times a day  ocular lubricant ophthalmic solution: 1 drop(s) to each affected eye 4 times a day, As Needed  Senexon-S 50 mg-8.6 mg oral tablet: 1 tab(s) orally 2 times a day  Synthroid 50 mcg (0.05 mg) oral tablet: 1 tab(s) orally once a day  Vitamin B-12 1000 mcg oral tablet: 1 tab(s) orally once a day    AICD/PPM: [ ] yes   [ ] no    PERTINENT LAB DATA:                        11.2   6.22  )-----------( 180      ( 27 Aug 2020 06:48 )             34.8     08-27    144  |  106  |  13  ----------------------------<  137<H>  3.5   |  22  |  0.95    Ca    9.2      27 Aug 2020 06:48  Phos  3.4     08-26  Mg     1.7     08-27                  PHYSICAL EXAMINATION:    T(C): 36.6  HR: 54  BP: 143/76  RR: 18  SpO2: 97%    Constitutional: NAD  HEENT: PERRLA, EOMI,    Neck:  No JVD  Respiratory: CTAB/L  Cardiovascular: S1 and S2  Gastrointestinal: BS+, soft, NT/ND  Extremities: No peripheral edema  Neurological: A/O x 3, no focal deficits  Psychiatric: Normal mood, normal affect  Skin: No rashes    ASA Class: I [ ]  II [ ]  III [x ]  IV [ ]    COMMENTS:    The patient is a suitable candidate for the planned procedure unless box checked [ ]  No, explain:

## 2020-08-27 NOTE — DIETITIAN INITIAL EVALUATION ADULT. - ADD RECOMMEND
1. Advance diet as able to Consistent Carbohydrate (no evening snacks) as indicated 2. Monitor for need for oral nutrition supplement with diet advancement 3. Nutrition education for elevated HgbA1c likely not indicated 4. Monitor GOC 5. Will continue to monitor nutrient intake, wt, labs, f/u per protocol 1. Advance diet as able to Consistent Carbohydrate (no evening snacks) as indicated 2. Monitor for need for oral nutrition supplement with diet advancement 3. Consideration for micronutrient supplementation deferred to team, can consider multivitamin if no contraindications 4. Nutrition education for elevated HgbA1c likely not indicated 5. Monitor GOC 6. Will continue to monitor nutrient intake, wt, labs, f/u per protocol

## 2020-08-27 NOTE — DIETITIAN INITIAL EVALUATION ADULT. - PERTINENT MEDS FT
MEDICATIONS  (STANDING):  dextrose 5%. 1000 milliLiter(s) (50 mL/Hr) IV Continuous <Continuous>  dextrose 50% Injectable 12.5 Gram(s) IV Push once  dextrose 50% Injectable 25 Gram(s) IV Push once  dextrose 50% Injectable 25 Gram(s) IV Push once  diltiazem    milliGRAM(s) Oral daily  heparin   Injectable 5000 Unit(s) SubCutaneous every 12 hours  insulin lispro (HumaLOG) corrective regimen sliding scale   SubCutaneous three times a day before meals  levothyroxine 50 MICROGram(s) Oral daily  losartan 50 milliGRAM(s) Oral daily  senna 2 Tablet(s) Oral at bedtime  sodium chloride 0.9%. 1000 milliLiter(s) (65 mL/Hr) IV Continuous <Continuous>    MEDICATIONS  (PRN):  artificial tears (preservative free) Ophthalmic Solution 1 Drop(s) Both EYES four times a day PRN Dry Eyes  dextrose 40% Gel 15 Gram(s) Oral once PRN Blood Glucose LESS THAN 70 milliGRAM(s)/deciliter  glucagon  Injectable 1 milliGRAM(s) IntraMuscular once PRN Glucose LESS THAN 70 milligrams/deciliter

## 2020-08-27 NOTE — DIETITIAN INITIAL EVALUATION ADULT. - SIGNS/SYMPTOMS
pt with inadequate diet order x 4 days, decreased appetite and wt loss x 4 weeks PTA per H&P HgbA1c 12.6%

## 2020-08-27 NOTE — DIETITIAN INITIAL EVALUATION ADULT. - MALNUTRITION
given limited subjective information, pt does not meet criteria for malnutrition at this time- will continue to monitor

## 2020-08-27 NOTE — DIETITIAN INITIAL EVALUATION ADULT. - REASON INDICATOR FOR ASSESSMENT
Pt seen for inadequate diet order x 4 days.  Information obtained from: electronic medical record; pt off unit at endoscopy

## 2020-08-27 NOTE — PRE-ANESTHESIA EVALUATION ADULT - NSANTHOSAYNRD_GEN_A_CORE
No. FAVIO screening performed.  STOP BANG Legend: 0-2 = LOW Risk; 3-4 = INTERMEDIATE Risk; 5-8 = HIGH Risk

## 2020-08-28 ENCOUNTER — TRANSCRIPTION ENCOUNTER (OUTPATIENT)
Age: 85
End: 2020-08-28

## 2020-08-28 DIAGNOSIS — R63.0 ANOREXIA: ICD-10-CM

## 2020-08-28 DIAGNOSIS — Z51.5 ENCOUNTER FOR PALLIATIVE CARE: ICD-10-CM

## 2020-08-28 DIAGNOSIS — K59.00 CONSTIPATION, UNSPECIFIED: ICD-10-CM

## 2020-08-28 DIAGNOSIS — Z71.89 OTHER SPECIFIED COUNSELING: ICD-10-CM

## 2020-08-28 LAB
ANION GAP SERPL CALC-SCNC: 15 MMOL/L — SIGNIFICANT CHANGE UP (ref 5–17)
BUN SERPL-MCNC: 10 MG/DL — SIGNIFICANT CHANGE UP (ref 7–23)
CALCIUM SERPL-MCNC: 8.3 MG/DL — LOW (ref 8.4–10.5)
CHLORIDE SERPL-SCNC: 108 MMOL/L — SIGNIFICANT CHANGE UP (ref 96–108)
CO2 SERPL-SCNC: 20 MMOL/L — LOW (ref 22–31)
CREAT SERPL-MCNC: 0.81 MG/DL — SIGNIFICANT CHANGE UP (ref 0.5–1.3)
GLUCOSE BLDC GLUCOMTR-MCNC: 103 MG/DL — HIGH (ref 70–99)
GLUCOSE BLDC GLUCOMTR-MCNC: 126 MG/DL — HIGH (ref 70–99)
GLUCOSE BLDC GLUCOMTR-MCNC: 127 MG/DL — HIGH (ref 70–99)
GLUCOSE BLDC GLUCOMTR-MCNC: 130 MG/DL — HIGH (ref 70–99)
GLUCOSE SERPL-MCNC: 121 MG/DL — HIGH (ref 70–99)
HCT VFR BLD CALC: 30.6 % — LOW (ref 34.5–45)
HGB BLD-MCNC: 10.3 G/DL — LOW (ref 11.5–15.5)
MAGNESIUM SERPL-MCNC: 1.3 MG/DL — LOW (ref 1.6–2.6)
MCHC RBC-ENTMCNC: 32.4 PG — SIGNIFICANT CHANGE UP (ref 27–34)
MCHC RBC-ENTMCNC: 33.7 GM/DL — SIGNIFICANT CHANGE UP (ref 32–36)
MCV RBC AUTO: 96.2 FL — SIGNIFICANT CHANGE UP (ref 80–100)
NON-GYNECOLOGICAL CYTOLOGY STUDY: SIGNIFICANT CHANGE UP
NRBC # BLD: 0 /100 WBCS — SIGNIFICANT CHANGE UP (ref 0–0)
PHOSPHATE SERPL-MCNC: 2.7 MG/DL — SIGNIFICANT CHANGE UP (ref 2.5–4.5)
PLATELET # BLD AUTO: 186 K/UL — SIGNIFICANT CHANGE UP (ref 150–400)
POTASSIUM SERPL-MCNC: 3.1 MMOL/L — LOW (ref 3.5–5.3)
POTASSIUM SERPL-SCNC: 3.1 MMOL/L — LOW (ref 3.5–5.3)
RBC # BLD: 3.18 M/UL — LOW (ref 3.8–5.2)
RBC # FLD: 13.2 % — SIGNIFICANT CHANGE UP (ref 10.3–14.5)
SODIUM SERPL-SCNC: 143 MMOL/L — SIGNIFICANT CHANGE UP (ref 135–145)
WBC # BLD: 12.67 K/UL — HIGH (ref 3.8–10.5)
WBC # FLD AUTO: 12.67 K/UL — HIGH (ref 3.8–10.5)

## 2020-08-28 PROCEDURE — 99497 ADVNCD CARE PLAN 30 MIN: CPT | Mod: 25

## 2020-08-28 PROCEDURE — 99232 SBSQ HOSP IP/OBS MODERATE 35: CPT | Mod: GC

## 2020-08-28 PROCEDURE — 99223 1ST HOSP IP/OBS HIGH 75: CPT

## 2020-08-28 RX ORDER — DRONABINOL 2.5 MG
1 CAPSULE ORAL
Qty: 30 | Refills: 0
Start: 2020-08-28 | End: 2020-09-26

## 2020-08-28 RX ORDER — POLYETHYLENE GLYCOL 3350 17 G/17G
17 POWDER, FOR SOLUTION ORAL
Qty: 765 | Refills: 0
Start: 2020-08-28 | End: 2020-09-26

## 2020-08-28 RX ORDER — MAGNESIUM SULFATE 500 MG/ML
2 VIAL (ML) INJECTION ONCE
Refills: 0 | Status: COMPLETED | OUTPATIENT
Start: 2020-08-28 | End: 2020-08-28

## 2020-08-28 RX ORDER — POTASSIUM CHLORIDE 20 MEQ
40 PACKET (EA) ORAL EVERY 4 HOURS
Refills: 0 | Status: COMPLETED | OUTPATIENT
Start: 2020-08-28 | End: 2020-08-28

## 2020-08-28 RX ORDER — POLYETHYLENE GLYCOL 3350 17 G/17G
17 POWDER, FOR SOLUTION ORAL DAILY
Refills: 0 | Status: DISCONTINUED | OUTPATIENT
Start: 2020-08-28 | End: 2020-08-29

## 2020-08-28 RX ORDER — DRONABINOL 2.5 MG
2.5 CAPSULE ORAL AT BEDTIME
Refills: 0 | Status: DISCONTINUED | OUTPATIENT
Start: 2020-08-28 | End: 2020-08-29

## 2020-08-28 RX ADMIN — Medication 50 MICROGRAM(S): at 06:31

## 2020-08-28 RX ADMIN — Medication 40 MILLIEQUIVALENT(S): at 13:19

## 2020-08-28 RX ADMIN — HEPARIN SODIUM 5000 UNIT(S): 5000 INJECTION INTRAVENOUS; SUBCUTANEOUS at 18:12

## 2020-08-28 RX ADMIN — Medication 40 MILLIEQUIVALENT(S): at 09:30

## 2020-08-28 RX ADMIN — LOSARTAN POTASSIUM 50 MILLIGRAM(S): 100 TABLET, FILM COATED ORAL at 06:31

## 2020-08-28 RX ADMIN — Medication 2.5 MILLIGRAM(S): at 21:36

## 2020-08-28 RX ADMIN — POLYETHYLENE GLYCOL 3350 17 GRAM(S): 17 POWDER, FOR SOLUTION ORAL at 18:12

## 2020-08-28 RX ADMIN — HEPARIN SODIUM 5000 UNIT(S): 5000 INJECTION INTRAVENOUS; SUBCUTANEOUS at 06:31

## 2020-08-28 RX ADMIN — Medication 240 MILLIGRAM(S): at 06:31

## 2020-08-28 RX ADMIN — Medication 50 GRAM(S): at 09:30

## 2020-08-28 NOTE — CONSULT NOTE ADULT - PROBLEM SELECTOR RECOMMENDATION 5
Will sing off.         Chu Smith MD   Geriatrics and Palliative Care (GAP) Consult Service    of Geriatric and Palliative Medicine  [facility]      Please page the following number for clinical matters between the hours of 9 am and 5 pm from Monday through Friday : [phone]    After 5pm and on weekends, please see the contact information below:    In the event of newly developing, evolving, or worsening symptoms, please contact the Palliative Medicine team via pager (if the patient is at [facility] #[phone] or if the patient is at [facility] #[phone]) The Geriatric and Palliative Medicine service has coverage 24 hours a day/ 7 days a week to provide medical recommendations regarding symptom management needs via telephone

## 2020-08-28 NOTE — DISCHARGE NOTE PROVIDER - NSDCFUADDAPPT_GEN_ALL_CORE_FT
Follow up with your primary care doctor and the doctors with hospice as no treatment plan is elected to be made at this time. Follow up with your primary care doctor and the doctors with hospice as no active treatment plan is elected to be made at this time.

## 2020-08-28 NOTE — DISCHARGE NOTE PROVIDER - NSDCCAREPROVSEEN_GEN_ALL_CORE_FT
Jarret Clay  [facility] Medicine, Advance PracticeTeam  [facility] Hospice, Team  [facility] Palliative Care, Team Jarret Clay  [facility] Medicine, Advance Practice Team  [facility] Hospice, Team  [facility] Palliative Care, Team

## 2020-08-28 NOTE — PROGRESS NOTE ADULT - SUBJECTIVE AND OBJECTIVE BOX
Patient is a 89y old  Female who presents with a chief complaint of Weakness (25 Aug 2020 11:20)      SUBJECTIVE / OVERNIGHT EVENTS:  Abdominal discomfort +    Review of Systems:   CONSTITUTIONAL: No fever, + weight loss, & fatigue  EYES: No eye pain, visual disturbances, or discharge  ENMT:  No difficulty hearing, tinnitus, vertigo; No sinus or throat pain  NECK: No pain or stiffness  BREASTS: No pain, masses, or nipple discharge  RESPIRATORY: No cough, wheezing, chills or hemoptysis; No shortness of breath  CARDIOVASCULAR: No chest pain, palpitations, dizziness, or leg swelling  GASTROINTESTINAL: Anorexia and mid abdominal discomfort reported  GENITOURINARY: No dysuria, frequency, hematuria, or incontinence  NEUROLOGICAL: No headaches, memory loss, loss of strength, numbness, or tremors  SKIN: No itching, burning, rashes, or lesions   LYMPH NODES: No enlarged glands  ENDOCRINE: No heat or cold intolerance; No hair loss  MUSCULOSKELETAL: No joint pain or swelling; No muscle, back, or extremity pain  PSYCHIATRIC: No depression, anxiety, mood swings, or difficulty sleeping  HEME/LYMPH: No easy bruising, or bleeding gums  ALLERY AND IMMUNOLOGIC: No hives or eczema    MEDICATIONS  (STANDING):  dextrose 5%. 1000 milliLiter(s) (50 mL/Hr) IV Continuous <Continuous>  dextrose 50% Injectable 12.5 Gram(s) IV Push once  dextrose 50% Injectable 25 Gram(s) IV Push once  dextrose 50% Injectable 25 Gram(s) IV Push once  diltiazem    milliGRAM(s) Oral daily  heparin   Injectable 5000 Unit(s) SubCutaneous every 12 hours  insulin lispro (HumaLOG) corrective regimen sliding scale   SubCutaneous three times a day before meals  levothyroxine 50 MICROGram(s) Oral daily  losartan 50 milliGRAM(s) Oral daily  senna 2 Tablet(s) Oral at bedtime    MEDICATIONS  (PRN):  artificial tears (preservative free) Ophthalmic Solution 1 Drop(s) Both EYES four times a day PRN Dry Eyes  dextrose 40% Gel 15 Gram(s) Oral once PRN Blood Glucose LESS THAN 70 milliGRAM(s)/deciliter  glucagon  Injectable 1 milliGRAM(s) IntraMuscular once PRN Glucose LESS THAN 70 milligrams/deciliter      PHYSICAL EXAM:  Vital Signs Last 24 Hrs  T(C): 36.9 (25 Aug 2020 04:27), Max: 37.2 (24 Aug 2020 21:33)  T(F): 98.5 (25 Aug 2020 04:27), Max: 98.9 (24 Aug 2020 21:33)  HR: 50 (25 Aug 2020 04:27) (50 - 87)  BP: 123/55 (25 Aug 2020 04:27) (123/55 - 158/78)  BP(mean): --  RR: 18 (25 Aug 2020 04:27) (16 - 18)  SpO2: 99% (25 Aug 2020 04:27) (97% - 100%)  I&O's Summary    24 Aug 2020 07:01  -  25 Aug 2020 07:00  --------------------------------------------------------  IN: 0 mL / OUT: 200 mL / NET: -200 mL    25 Aug 2020 07:01  -  25 Aug 2020 12:09  --------------------------------------------------------  IN: 240 mL / OUT: 0 mL / NET: 240 mL      GENERAL: NAD, well-developed  HEAD:  Atraumatic, Normocephalic  EYES: EOMI, PERRLA, conjunctiva and sclera clear  NECK: Supple, No JVD  CHEST/LUNG: Clear to auscultation bilaterally; No wheeze  HEART: Regular rate and rhythm; No murmurs, rubs, or gallops  ABDOMEN: Soft, Nontender, Nondistended; Bowel sounds present  EXTREMITIES:  2+ Peripheral Pulses, No clubbing, cyanosis, or edema  PSYCH: AAOx3  NEUROLOGY: non-focal  SKIN: No rashes or lesions    LABS:  CAPILLARY BLOOD GLUCOSE      POCT Blood Glucose.: 143 mg/dL (25 Aug 2020 07:49)  POCT Blood Glucose.: 125 mg/dL (24 Aug 2020 21:36)                          11.1   7.76  )-----------( 187      ( 25 Aug 2020 06:44 )             33.7     08-25    145  |  105  |  17  ----------------------------<  130<H>  2.8<LL>   |  23  |  1.54<H>    Ca    9.3      25 Aug 2020 06:44  Phos  3.1     08-24  Mg     1.9     08-24    TPro  5.6<L>  /  Alb  3.6  /  TBili  0.5  /  DBili  x   /  AST  15  /  ALT  7<L>  /  AlkPhos  44  08-25    PT/INR - ( 24 Aug 2020 16:24 )   PT: 12.5 sec;   INR: 1.05 ratio         PTT - ( 24 Aug 2020 16:24 )  PTT:33.4 sec          RADIOLOGY & ADDITIONAL TESTS:    Imaging Personally Reviewed:    Consultant(s) Notes Reviewed:      Care Discussed with Consultants/Other Providers:
Chief Complaint:  Patient is a 89y old  Female who presents with a chief complaint of Pancreatic Mass (26 Aug 2020 08:39)    Interval Events:   No acute overnight events  No abdominal pain, nausea, vomiting, diarrhea     Allergies:  No Known Allergies      Hospital Medications:  artificial tears (preservative free) Ophthalmic Solution 1 Drop(s) Both EYES four times a day PRN  dextrose 40% Gel 15 Gram(s) Oral once PRN  dextrose 5%. 1000 milliLiter(s) IV Continuous <Continuous>  dextrose 50% Injectable 12.5 Gram(s) IV Push once  dextrose 50% Injectable 25 Gram(s) IV Push once  dextrose 50% Injectable 25 Gram(s) IV Push once  diltiazem    milliGRAM(s) Oral daily  glucagon  Injectable 1 milliGRAM(s) IntraMuscular once PRN  heparin   Injectable 5000 Unit(s) SubCutaneous every 12 hours  insulin lispro (HumaLOG) corrective regimen sliding scale   SubCutaneous three times a day before meals  levothyroxine 50 MICROGram(s) Oral daily  losartan 50 milliGRAM(s) Oral daily  senna 2 Tablet(s) Oral at bedtime  sodium chloride 0.9%. 1000 milliLiter(s) IV Continuous <Continuous>      PMHX/PSHX:  HTN (hypertension)  H/O: hysterectomy      ROS:   General:  No fevers, chills or night sweats  ENT:  No sore throat or dysphagia  CV:  No pain or palpitations  Resp:  No dyspnea, cough or  wheezing  GI:  No pain, No nausea, No vomiting, No diarrhea, No rectal bleeding, No tarry stools,  Skin:  No rash or edema    PHYSICAL EXAM:   Vital Signs:  Vital Signs Last 24 Hrs  T(C): 37.1 (28 Aug 2020 04:17), Max: 37.2 (27 Aug 2020 20:46)  T(F): 98.7 (28 Aug 2020 04:17), Max: 99 (27 Aug 2020 20:46)  HR: 73 (28 Aug 2020 04:17) (54 - 86)  BP: 138/74 (28 Aug 2020 04:17) (133/66 - 161/68)  BP(mean): 134 (27 Aug 2020 14:28) (134 - 134)  RR: 18 (28 Aug 2020 04:17) (14 - 18)  SpO2: 97% (28 Aug 2020 04:17) (96% - 100%)  Daily Height in cm: 154.94 (27 Aug 2020 12:56)    Daily Weight in k.4 (27 Aug 2020 14:04)    GENERAL:  NAD, Appears stated age  HEENT:  NC/AT,  conjunctivae clear and pink, sclera -anicteric  CHEST:  Normal Effort, Breath sounds clear  HEART:  RRR, S1 + S2, no murmurs  ABDOMEN:  Soft, non-tender, non-distended, BS+  EXTEREMITIES:  no cyanosis  SKIN:  Warm & Dry.  NEURO:  Alert, oriented    LABS:                        10.3   12.67 )-----------( 186      ( 28 Aug 2020 05:59 )             30.6     Mean Cell Volume: 96.2 fl (-20 @ 05:59)        143  |  108  |  10  ----------------------------<  121<H>  3.1<L>   |  20<L>  |  0.81    Ca    8.3<L>      28 Aug 2020 06:00  Phos  2.7       Mg     1.3                                         10.3   12.67 )-----------( 186      ( 28 Aug 2020 05:59 )             30.6                         11.2   6.22  )-----------( 180      ( 27 Aug 2020 06:48 )             34.8                         10.8   7.36  )-----------( 195      ( 26 Aug 2020 06:26 )             33.6       Imaging:
Chief Complaint:  Patient is a 89y old  Female who presents with a chief complaint of Weakness (25 Aug 2020 11:20)    Interval Events:   No acute overnight events    Allergies:  No Known Allergies      Hospital Medications:  artificial tears (preservative free) Ophthalmic Solution 1 Drop(s) Both EYES four times a day PRN  dextrose 40% Gel 15 Gram(s) Oral once PRN  dextrose 5%. 1000 milliLiter(s) IV Continuous <Continuous>  dextrose 50% Injectable 12.5 Gram(s) IV Push once  dextrose 50% Injectable 25 Gram(s) IV Push once  dextrose 50% Injectable 25 Gram(s) IV Push once  diltiazem    milliGRAM(s) Oral daily  glucagon  Injectable 1 milliGRAM(s) IntraMuscular once PRN  heparin   Injectable 5000 Unit(s) SubCutaneous every 12 hours  insulin lispro (HumaLOG) corrective regimen sliding scale   SubCutaneous three times a day before meals  levothyroxine 50 MICROGram(s) Oral daily  losartan 50 milliGRAM(s) Oral daily  senna 2 Tablet(s) Oral at bedtime      PMHX/PSHX:  HTN (hypertension)  H/O: hysterectomy      ROS:   General:  No fevers, chills or night sweats  ENT:  No sore throat or dysphagia  CV:  No pain or palpitations  Resp:  No dyspnea, cough or  wheezing  GI:  No pain, No nausea, No vomiting, No diarrhea, No rectal bleeding, No tarry stools,  Skin:  No rash or edema    PHYSICAL EXAM:   Vital Signs:  Vital Signs Last 24 Hrs  T(C): 36.7 (26 Aug 2020 04:26), Max: 37 (25 Aug 2020 20:50)  T(F): 98 (26 Aug 2020 04:26), Max: 98.6 (25 Aug 2020 20:50)  HR: 65 (26 Aug 2020 04:26) (58 - 65)  BP: 143/78 (26 Aug 2020 04:26) (129/69 - 144/75)  BP(mean): --  RR: 18 (26 Aug 2020 04:26) (18 - 18)  SpO2: 97% (26 Aug 2020 04:26) (97% - 99%)  Daily     Daily     GENERAL:  NAD, Appears stated age  HEENT:  NC/AT,  conjunctivae clear and pink, sclera -anicteric  CHEST:  Normal Effort, Breath sounds clear  HEART:  RRR, S1 + S2, no murmurs  ABDOMEN:  Soft, non-tender, non-distended, BS+  EXTEREMITIES:  no cyanosis  SKIN:  Warm & Dry.  NEURO:  Alert, oriented    LABS:                        10.8   7.36  )-----------( 195      ( 26 Aug 2020 06:26 )             33.6     Mean Cell Volume: 97.4 fl (08-26-20 @ 06:26)    08-26    142  |  104  |  17  ----------------------------<  119<H>  3.3<L>   |  20<L>  |  1.37<H>    Ca    9.4      26 Aug 2020 06:25  Phos  3.4     08-26  Mg     1.9     08-26    TPro  5.6<L>  /  Alb  3.6  /  TBili  0.5  /  DBili  x   /  AST  15  /  ALT  7<L>  /  AlkPhos  44  08-25    LIVER FUNCTIONS - ( 25 Aug 2020 06:44 )  Alb: 3.6 g/dL / Pro: 5.6 g/dL / ALK PHOS: 44 U/L / ALT: 7 U/L / AST: 15 U/L / GGT: x           PT/INR - ( 24 Aug 2020 16:24 )   PT: 12.5 sec;   INR: 1.05 ratio       PTT - ( 24 Aug 2020 16:24 )  PTT:33.4 sec                        10.8   7.36  )-----------( 195      ( 26 Aug 2020 06:26 )             33.6                         11.1   7.76  )-----------( 187      ( 25 Aug 2020 06:44 )             33.7                         11.4   8.73  )-----------( 225      ( 24 Aug 2020 16:24 )             34.7       Imaging:
Patient is a 89y old  Female who presents with a chief complaint of Weakness (25 Aug 2020 11:20)      SUBJECTIVE / OVERNIGHT EVENTS:  Abdominal discomfort +  Review of Systems:   CONSTITUTIONAL: No fever, + weight loss, & fatigue  EYES: No eye pain, visual disturbances, or discharge  ENMT:  No difficulty hearing, tinnitus, vertigo; No sinus or throat pain  NECK: No pain or stiffness  BREASTS: No pain, masses, or nipple discharge  RESPIRATORY: No cough, wheezing, chills or hemoptysis; No shortness of breath  CARDIOVASCULAR: No chest pain, palpitations, dizziness, or leg swelling  GASTROINTESTINAL: Anorexia and mid abdominal discomfort reported  GENITOURINARY: No dysuria, frequency, hematuria, or incontinence  NEUROLOGICAL: No headaches, memory loss, loss of strength, numbness, or tremors  SKIN: No itching, burning, rashes, or lesions   LYMPH NODES: No enlarged glands  ENDOCRINE: No heat or cold intolerance; No hair loss  MUSCULOSKELETAL: No joint pain or swelling; No muscle, back, or extremity pain  PSYCHIATRIC: No depression, anxiety, mood swings, or difficulty sleeping  HEME/LYMPH: No easy bruising, or bleeding gums  ALLERY AND IMMUNOLOGIC: No hives or eczema    MEDICATIONS  (STANDING):  dextrose 5%. 1000 milliLiter(s) (50 mL/Hr) IV Continuous <Continuous>  dextrose 50% Injectable 12.5 Gram(s) IV Push once  dextrose 50% Injectable 25 Gram(s) IV Push once  dextrose 50% Injectable 25 Gram(s) IV Push once  diltiazem    milliGRAM(s) Oral daily  heparin   Injectable 5000 Unit(s) SubCutaneous every 12 hours  insulin lispro (HumaLOG) corrective regimen sliding scale   SubCutaneous three times a day before meals  levothyroxine 50 MICROGram(s) Oral daily  losartan 50 milliGRAM(s) Oral daily  senna 2 Tablet(s) Oral at bedtime    MEDICATIONS  (PRN):  artificial tears (preservative free) Ophthalmic Solution 1 Drop(s) Both EYES four times a day PRN Dry Eyes  dextrose 40% Gel 15 Gram(s) Oral once PRN Blood Glucose LESS THAN 70 milliGRAM(s)/deciliter  glucagon  Injectable 1 milliGRAM(s) IntraMuscular once PRN Glucose LESS THAN 70 milligrams/deciliter      PHYSICAL EXAM:  Vital Signs Last 24 Hrs  T(C): 36.9 (25 Aug 2020 04:27), Max: 37.2 (24 Aug 2020 21:33)  T(F): 98.5 (25 Aug 2020 04:27), Max: 98.9 (24 Aug 2020 21:33)  HR: 50 (25 Aug 2020 04:27) (50 - 87)  BP: 123/55 (25 Aug 2020 04:27) (123/55 - 158/78)  BP(mean): --  RR: 18 (25 Aug 2020 04:27) (16 - 18)  SpO2: 99% (25 Aug 2020 04:27) (97% - 100%)  I&O's Summary    24 Aug 2020 07:01  -  25 Aug 2020 07:00  --------------------------------------------------------  IN: 0 mL / OUT: 200 mL / NET: -200 mL    25 Aug 2020 07:01  -  25 Aug 2020 12:09  --------------------------------------------------------  IN: 240 mL / OUT: 0 mL / NET: 240 mL      GENERAL: NAD, well-developed  HEAD:  Atraumatic, Normocephalic  EYES: EOMI, PERRLA, conjunctiva and sclera clear  NECK: Supple, No JVD  CHEST/LUNG: Clear to auscultation bilaterally; No wheeze  HEART: Regular rate and rhythm; No murmurs, rubs, or gallops  ABDOMEN: Soft, Nontender, Nondistended; Bowel sounds present  EXTREMITIES:  2+ Peripheral Pulses, No clubbing, cyanosis, or edema  PSYCH: AAOx3  NEUROLOGY: non-focal  SKIN: No rashes or lesions    LABS:  CAPILLARY BLOOD GLUCOSE      POCT Blood Glucose.: 143 mg/dL (25 Aug 2020 07:49)  POCT Blood Glucose.: 125 mg/dL (24 Aug 2020 21:36)                          11.1   7.76  )-----------( 187      ( 25 Aug 2020 06:44 )             33.7     08-25    145  |  105  |  17  ----------------------------<  130<H>  2.8<LL>   |  23  |  1.54<H>    Ca    9.3      25 Aug 2020 06:44  Phos  3.1     08-24  Mg     1.9     08-24    TPro  5.6<L>  /  Alb  3.6  /  TBili  0.5  /  DBili  x   /  AST  15  /  ALT  7<L>  /  AlkPhos  44  08-25    PT/INR - ( 24 Aug 2020 16:24 )   PT: 12.5 sec;   INR: 1.05 ratio         PTT - ( 24 Aug 2020 16:24 )  PTT:33.4 sec          RADIOLOGY & ADDITIONAL TESTS:    Imaging Personally Reviewed:    Consultant(s) Notes Reviewed:      Care Discussed with Consultants/Other Providers:
Patient is a 89y old  Female who presents with a chief complaint of Weakness (25 Aug 2020 11:20)      SUBJECTIVE / OVERNIGHT EVENTS:  Abdominal discomfort +  S/P EUS    Review of Systems:   CONSTITUTIONAL: No fever, + weight loss, & fatigue  EYES: No eye pain, visual disturbances, or discharge  ENMT:  No difficulty hearing, tinnitus, vertigo; No sinus or throat pain  NECK: No pain or stiffness  BREASTS: No pain, masses, or nipple discharge  RESPIRATORY: No cough, wheezing, chills or hemoptysis; No shortness of breath  CARDIOVASCULAR: No chest pain, palpitations, dizziness, or leg swelling  GASTROINTESTINAL: Anorexia and mid abdominal discomfort reported  GENITOURINARY: No dysuria, frequency, hematuria, or incontinence  NEUROLOGICAL: No headaches, memory loss, loss of strength, numbness, or tremors  SKIN: No itching, burning, rashes, or lesions   LYMPH NODES: No enlarged glands  ENDOCRINE: No heat or cold intolerance; No hair loss  MUSCULOSKELETAL: No joint pain or swelling; No muscle, back, or extremity pain  PSYCHIATRIC: No depression, anxiety, mood swings, or difficulty sleeping  HEME/LYMPH: No easy bruising, or bleeding gums  ALLERY AND IMMUNOLOGIC: No hives or eczema    MEDICATIONS  (STANDING):  dextrose 5%. 1000 milliLiter(s) (50 mL/Hr) IV Continuous <Continuous>  dextrose 50% Injectable 12.5 Gram(s) IV Push once  dextrose 50% Injectable 25 Gram(s) IV Push once  dextrose 50% Injectable 25 Gram(s) IV Push once  diltiazem    milliGRAM(s) Oral daily  heparin   Injectable 5000 Unit(s) SubCutaneous every 12 hours  insulin lispro (HumaLOG) corrective regimen sliding scale   SubCutaneous three times a day before meals  levothyroxine 50 MICROGram(s) Oral daily  losartan 50 milliGRAM(s) Oral daily  senna 2 Tablet(s) Oral at bedtime    MEDICATIONS  (PRN):  artificial tears (preservative free) Ophthalmic Solution 1 Drop(s) Both EYES four times a day PRN Dry Eyes  dextrose 40% Gel 15 Gram(s) Oral once PRN Blood Glucose LESS THAN 70 milliGRAM(s)/deciliter  glucagon  Injectable 1 milliGRAM(s) IntraMuscular once PRN Glucose LESS THAN 70 milligrams/deciliter      PHYSICAL EXAM:  Vital Signs Last 24 Hrs  T(C): 36.9 (25 Aug 2020 04:27), Max: 37.2 (24 Aug 2020 21:33)  T(F): 98.5 (25 Aug 2020 04:27), Max: 98.9 (24 Aug 2020 21:33)  HR: 50 (25 Aug 2020 04:27) (50 - 87)  BP: 123/55 (25 Aug 2020 04:27) (123/55 - 158/78)  BP(mean): --  RR: 18 (25 Aug 2020 04:27) (16 - 18)  SpO2: 99% (25 Aug 2020 04:27) (97% - 100%)  I&O's Summary    24 Aug 2020 07:01  -  25 Aug 2020 07:00  --------------------------------------------------------  IN: 0 mL / OUT: 200 mL / NET: -200 mL    25 Aug 2020 07:01  -  25 Aug 2020 12:09  --------------------------------------------------------  IN: 240 mL / OUT: 0 mL / NET: 240 mL      GENERAL: NAD, well-developed  HEAD:  Atraumatic, Normocephalic  EYES: EOMI, PERRLA, conjunctiva and sclera clear  NECK: Supple, No JVD  CHEST/LUNG: Clear to auscultation bilaterally; No wheeze  HEART: Regular rate and rhythm; No murmurs, rubs, or gallops  ABDOMEN: Soft, Nontender, Nondistended; Bowel sounds present  EXTREMITIES:  2+ Peripheral Pulses, No clubbing, cyanosis, or edema  PSYCH: AAOx3  NEUROLOGY: non-focal  SKIN: No rashes or lesions    LABS:  CAPILLARY BLOOD GLUCOSE      POCT Blood Glucose.: 143 mg/dL (25 Aug 2020 07:49)  POCT Blood Glucose.: 125 mg/dL (24 Aug 2020 21:36)                          11.1   7.76  )-----------( 187      ( 25 Aug 2020 06:44 )             33.7     08-25    145  |  105  |  17  ----------------------------<  130<H>  2.8<LL>   |  23  |  1.54<H>    Ca    9.3      25 Aug 2020 06:44  Phos  3.1     08-24  Mg     1.9     08-24    TPro  5.6<L>  /  Alb  3.6  /  TBili  0.5  /  DBili  x   /  AST  15  /  ALT  7<L>  /  AlkPhos  44  08-25    PT/INR - ( 24 Aug 2020 16:24 )   PT: 12.5 sec;   INR: 1.05 ratio         PTT - ( 24 Aug 2020 16:24 )  PTT:33.4 sec          RADIOLOGY & ADDITIONAL TESTS:    Imaging Personally Reviewed:    Consultant(s) Notes Reviewed:      Care Discussed with Consultants/Other Providers:
Patient is a 89y old  Female who presents with a chief complaint of Weakness (25 Aug 2020 11:20)      SUBJECTIVE / OVERNIGHT EVENTS:  Abdominal discomfort +  S/P EUS  Wants to go home  Review of Systems:   CONSTITUTIONAL: No fever, + weight loss, & fatigue  EYES: No eye pain, visual disturbances, or discharge  ENMT:  No difficulty hearing, tinnitus, vertigo; No sinus or throat pain  NECK: No pain or stiffness  BREASTS: No pain, masses, or nipple discharge  RESPIRATORY: No cough, wheezing, chills or hemoptysis; No shortness of breath  CARDIOVASCULAR: No chest pain, palpitations, dizziness, or leg swelling  GASTROINTESTINAL: Anorexia and mid abdominal discomfort reported  GENITOURINARY: No dysuria, frequency, hematuria, or incontinence  NEUROLOGICAL: No headaches, memory loss, loss of strength, numbness, or tremors  SKIN: No itching, burning, rashes, or lesions   LYMPH NODES: No enlarged glands  ENDOCRINE: No heat or cold intolerance; No hair loss  MUSCULOSKELETAL: No joint pain or swelling; No muscle, back, or extremity pain  PSYCHIATRIC: No depression, anxiety, mood swings, or difficulty sleeping  HEME/LYMPH: No easy bruising, or bleeding gums  ALLERY AND IMMUNOLOGIC: No hives or eczema    MEDICATIONS  (STANDING):  dextrose 5%. 1000 milliLiter(s) (50 mL/Hr) IV Continuous <Continuous>  dextrose 50% Injectable 12.5 Gram(s) IV Push once  dextrose 50% Injectable 25 Gram(s) IV Push once  dextrose 50% Injectable 25 Gram(s) IV Push once  diltiazem    milliGRAM(s) Oral daily  heparin   Injectable 5000 Unit(s) SubCutaneous every 12 hours  insulin lispro (HumaLOG) corrective regimen sliding scale   SubCutaneous three times a day before meals  levothyroxine 50 MICROGram(s) Oral daily  losartan 50 milliGRAM(s) Oral daily  senna 2 Tablet(s) Oral at bedtime    MEDICATIONS  (PRN):  artificial tears (preservative free) Ophthalmic Solution 1 Drop(s) Both EYES four times a day PRN Dry Eyes  dextrose 40% Gel 15 Gram(s) Oral once PRN Blood Glucose LESS THAN 70 milliGRAM(s)/deciliter  glucagon  Injectable 1 milliGRAM(s) IntraMuscular once PRN Glucose LESS THAN 70 milligrams/deciliter      PHYSICAL EXAM:  Vital Signs Last 24 Hrs  T(C): 36.9 (25 Aug 2020 04:27), Max: 37.2 (24 Aug 2020 21:33)  T(F): 98.5 (25 Aug 2020 04:27), Max: 98.9 (24 Aug 2020 21:33)  HR: 50 (25 Aug 2020 04:27) (50 - 87)  BP: 123/55 (25 Aug 2020 04:27) (123/55 - 158/78)  BP(mean): --  RR: 18 (25 Aug 2020 04:27) (16 - 18)  SpO2: 99% (25 Aug 2020 04:27) (97% - 100%)  I&O's Summary    24 Aug 2020 07:01  -  25 Aug 2020 07:00  --------------------------------------------------------  IN: 0 mL / OUT: 200 mL / NET: -200 mL    25 Aug 2020 07:01  -  25 Aug 2020 12:09  --------------------------------------------------------  IN: 240 mL / OUT: 0 mL / NET: 240 mL      GENERAL: NAD, well-developed  HEAD:  Atraumatic, Normocephalic  EYES: EOMI, PERRLA, conjunctiva and sclera clear  NECK: Supple, No JVD  CHEST/LUNG: Clear to auscultation bilaterally; No wheeze  HEART: Regular rate and rhythm; No murmurs, rubs, or gallops  ABDOMEN: Soft, Nontender, Nondistended; Bowel sounds present  EXTREMITIES:  2+ Peripheral Pulses, No clubbing, cyanosis, or edema  PSYCH: AAOx3  NEUROLOGY: non-focal  SKIN: No rashes or lesions    LABS:  CAPILLARY BLOOD GLUCOSE      POCT Blood Glucose.: 143 mg/dL (25 Aug 2020 07:49)  POCT Blood Glucose.: 125 mg/dL (24 Aug 2020 21:36)                          11.1   7.76  )-----------( 187      ( 25 Aug 2020 06:44 )             33.7     08-25    145  |  105  |  17  ----------------------------<  130<H>  2.8<LL>   |  23  |  1.54<H>    Ca    9.3      25 Aug 2020 06:44  Phos  3.1     08-24  Mg     1.9     08-24    TPro  5.6<L>  /  Alb  3.6  /  TBili  0.5  /  DBili  x   /  AST  15  /  ALT  7<L>  /  AlkPhos  44  08-25    PT/INR - ( 24 Aug 2020 16:24 )   PT: 12.5 sec;   INR: 1.05 ratio         PTT - ( 24 Aug 2020 16:24 )  PTT:33.4 sec          RADIOLOGY & ADDITIONAL TESTS:    Imaging Personally Reviewed:    Consultant(s) Notes Reviewed:      Care Discussed with Consultants/Other Providers:

## 2020-08-28 NOTE — DISCHARGE NOTE PROVIDER - CARE PROVIDER_API CALL
Génesis Krause  [id number]  [id number] [place] [address]  Phone: [phone]  Fax: ()-  Follow Up Time: 1 week

## 2020-08-28 NOTE — CONSULT NOTE ADULT - CONVERSATION DETAILS
d/w the patient and her son about code status. The patient and her son agree on that understanding her age and current Dx that in the setting of a cardiac or respiratory arrest that she does not want heroic measures. She agree with DNR/I. A MOLST was completed.     16' were spent in ACP.

## 2020-08-28 NOTE — DISCHARGE NOTE PROVIDER - NSDCCPCAREPLAN_GEN_ALL_CORE_FT
PRINCIPAL DISCHARGE DIAGNOSIS  Diagnosis: Pancreatic neoplasm  Assessment and Plan of Treatment: Confirmed with adenocarcinoma mass of the pancreas. You opted for no aggressive intervention. Follow up with your primary care doctor and the doctors for hospice. PRINCIPAL DISCHARGE DIAGNOSIS  Diagnosis: Pancreatic neoplasm  Assessment and Plan of Treatment: Confirmed with adenocarcinoma mass of the pancreas. You opted for no aggressive intervention. Follow up with your primary care doctor and the doctors for hospice. Hospice services will attend to you at home starting this Monday 8/31/20.      SECONDARY DISCHARGE DIAGNOSES  Diagnosis: Hypertension  Assessment and Plan of Treatment: You home medication with Losartan and hydrochlorthiazide is being changed to plain Losartan to prevent negative kidney effects. You need to follow up with your Primary MD within 1 week and have your blood pressure checked. If your blood pressure is low or you develop dizziness or lightheadedness then this medication may need to be stopped completely. Call your MD's office for an appointment this coming week.

## 2020-08-28 NOTE — CONSULT NOTE ADULT - ASSESSMENT
89 yo female with htn, hypothyroidism, DM, presenting for concern of wt loss and decreased appetite x 4 weeks. Pt had out pt abdominal US with PMD Dr. Génesis Krause which was significant for hepatic and pancreatic lesions, was scheduled for abd MRI today however was too weak to make the appointment. Pt stated she was only able to tolerate some water but no other food/liquid, has been generally constipated. She denied abdominal pain, nausea or vomiting and is generally weak. no chest pain shortness of breath or bloody stools, no hx of EtOH or smoking. EUS showed " a round, well circumscribed heterogeneous hypoechoic lesion in the tail of the pancreas. The lesion had cystic components. It measured approximately 2.2cm in max diameter. The lesion appeared to abut the splenic vein but there was no invasion. CT Abdomen and Pelvis No Cont also showed: "A soft tissue mass in the proximal pancreatic tail is consistent with neoplasm." As per the primary team notes: "Her condition was discussed with her son, Hair. Results of the EUS were notified. He is aware of whipple surgery and opted not to go through.  Palliative evaluation was agreed upon." Palliative care was called to further discuss services, GOC, and ACP.

## 2020-08-28 NOTE — PROGRESS NOTE ADULT - PROBLEM SELECTOR PLAN 1
MRI findings noted and patient was informed. At first, she did not want any further RAHMAN. Pancreatic cancer treatment may require Whipple surgery, a major GI surgery with high morbidity risk.  Advanced GI evaluation noted
MRI findings noted and patient was informed. At first, she did not want any further RAHMAN. Pancreatic cancer treatment may require Whipple surgery, a major GI surgery with high morbidity risk.  EUS shows a neoplastic appearing mass in tail of pancreas
MRI findings noted and patient was informed. At first, she did not want any further RAHMAN. Pancreatic cancer treatment may require Whipple surgery, a major GI surgery with high morbidity risk.  EUS shows a neoplastic appearing mass in tail of pancreas. Biopsy pathology is positive
Will need GI evaluation. Tumour markers ordered. MRCP ordered

## 2020-08-28 NOTE — PROGRESS NOTE ADULT - PROBLEM SELECTOR PROBLEM 3
Controlled type 2 diabetes mellitus without complication, without long-term current use of insulin

## 2020-08-28 NOTE — PROGRESS NOTE ADULT - ASSESSMENT
Impression:  89 F x of DM, HTN, hypothyroidism, found to have pancreatic tail mass    # Pancreatic tail mass: Concern for malignancy in setting of weight loss and with associated PD dilatation s/p EUS with FNB    Recommendations  - Follow up Path  - Diet as tolerated  - Supportive care per primary team    Malissa Melgar MD  Gastroenterology Fellow  [phone]  Available on [employer] Teams  Please page on call fellow on weekends and after 5pm on weekdays: [phone]

## 2020-08-28 NOTE — DISCHARGE NOTE PROVIDER - HOSPITAL COURSE
89 y/o F with PMHx of HTN, hypothyroidism, DM2, presenting for concern of wt loss and decreased appetite x 4 weeks. Pt had out pt abdominal US with PMD Dr. Génesis Krause which was significant for hepatic and pancreatic lesions, was scheduled for abd MRI today however was too weak to make the appointment. Pt states she is only able to tolerate some water but no other food/liquid, has been generally constipated however had normal BM yesterday, denies abdominal pain, nausea or vomiting and is generally weak. no chest pain shortness of breath or bloody stools, no hx of EtOH or smoking. CT A/P showed: A soft tissue mass in the proximal pancreatic tail is consistent with neoplasm. An MRI was performed Corresponding to CT findings of 08/24/2020, there is 1.9 cm pancreatic body mass with marked upstream pancreatic ductal dilatation, highly suspicious for neoplasm. Evaluation of the vasculature is limited due to motion. Question splenic vein involvement. Splenic artery is not well visualized.     No suspicious liver lesions on this motion limited study.     Pt underwent an EUS/FNB which confirmed adenocarcinoma. Pt and family did not want aggressive intervention.     Pt was referred for outpatient hospice and discharged home. 89 y/o F with PMHx of HTN, hypothyroidism, DM2, presenting for concern of wt loss and decreased appetite x 4 weeks. Pt had out pt abdominal US with PMD Dr. Génesis Krause which was significant for hepatic and pancreatic lesions, was scheduled for abd MRI today however was too weak to make the appointment. Pt states she is only able to tolerate some water but no other food/liquid, has been generally constipated however had normal BM yesterday, denies abdominal pain, nausea or vomiting and is generally weak. no chest pain shortness of breath or bloody stools, no hx of EtOH or smoking. CT A/P showed: A soft tissue mass in the proximal pancreatic tail is consistent with neoplasm. An MRI was performed Corresponding to CT findings of 08/24/2020, there is 1.9 cm pancreatic body mass with marked upstream pancreatic ductal dilatation, highly suspicious for neoplasm. Evaluation of the vasculature is limited due to motion. Question splenic vein involvement. Splenic artery is not well visualized.     No suspicious liver lesions on this motion limited study.     Pt underwent an EUS/FNB which confirmed adenocarcinoma. Pt and family did not want aggressive intervention.     Pt was referred for outpatient hospice by Palliative Care consult. Hospice will coordinate with pt and family on Monday 8/31. PT eval - no PT needs, home with existing rolling walker.    Stable for D/C home with outpatient hospice services and F/U with PMD Dr Génesis Krause.

## 2020-08-28 NOTE — PROGRESS NOTE ADULT - ATTENDING COMMENTS
As above.    Patient interactive, but may have some dementia.  She requests that we discuss her care with her son.    Will contact son to discuss pancreatic mass.
Her condition was discussed with her son, Hair. Results of the EUS were notified. He is aware of whipple surgery and opted not to go through.  Palliative evaluation was agreed upon
Her condition was discussed with her son, Hair. Results of the EUS were notified. He is aware of whipple surgery and opted not to go through.  Palliative evaluation was agreed upon  DC planning

## 2020-08-28 NOTE — CONSULT NOTE ADULT - PROBLEM SELECTOR RECOMMENDATION 9
d/w the patient and her son that gave verbalized understanding about her illness. They indicated that based on her age and lack of distressful symptoms that GOC are towards a conservative approach and considering home hospice. A hospice referral was made.

## 2020-08-28 NOTE — DISCHARGE NOTE PROVIDER - NSDCMRMEDTOKEN_GEN_ALL_CORE_FT
dilTIAZem 240 mg/24 hours oral capsule, extended release: 1 cap(s) orally once a day  glipiZIDE 2.5 mg oral tablet, extended release: 1 tab(s) orally once a day  losartan-hydrochlorothiazide 100 mg-25 mg oral tablet: 1 tab(s) orally once a day  metFORMIN 500 mg oral tablet: 1 tab(s) orally 2 times a day  ocular lubricant ophthalmic solution: 1 drop(s) to each affected eye 4 times a day, As Needed  Senexon-S 50 mg-8.6 mg oral tablet: 1 tab(s) orally 2 times a day  Synthroid 50 mcg (0.05 mg) oral tablet: 1 tab(s) orally once a day  Vitamin B-12 1000 mcg oral tablet: 1 tab(s) orally once a day dilTIAZem 240 mg/24 hours oral capsule, extended release: 1 cap(s) orally once a day  dronabinol 2.5 mg oral capsule: 1 cap(s) orally once a day (at bedtime) MDD:1  glipiZIDE 2.5 mg oral tablet, extended release: 1 tab(s) orally once a day  losartan-hydrochlorothiazide 100 mg-25 mg oral tablet: 1 tab(s) orally once a day  metFORMIN 500 mg oral tablet: 1 tab(s) orally 2 times a day  ocular lubricant ophthalmic solution: 1 drop(s) to each affected eye 4 times a day, As Needed  polyethylene glycol 3350 oral powder for reconstitution: 17 gram(s) orally once a day  Senexon-S 50 mg-8.6 mg oral tablet: 1 tab(s) orally 2 times a day  Synthroid 50 mcg (0.05 mg) oral tablet: 1 tab(s) orally once a day  Vitamin B-12 1000 mcg oral tablet: 1 tab(s) orally once a day dilTIAZem 240 mg/24 hours oral capsule, extended release: 1 cap(s) orally once a day  dronabinol 2.5 mg oral capsule: 1 cap(s) orally once a day (at bedtime) MDD:1  glipiZIDE 2.5 mg oral tablet, extended release: 1 tab(s) orally once a day  losartan 50 mg oral tablet: 1 tab(s) orally once a day  metFORMIN 500 mg oral tablet: 1 tab(s) orally 2 times a day  ocular lubricant ophthalmic solution: 1 drop(s) to each affected eye 4 times a day, As Needed  polyethylene glycol 3350 oral powder for reconstitution: 17 gram(s) orally once a day  Senexon-S 50 mg-8.6 mg oral tablet: 1 tab(s) orally 2 times a day  Synthroid 50 mcg (0.05 mg) oral tablet: 1 tab(s) orally once a day  Vitamin B-12 1000 mcg oral tablet: 1 tab(s) orally once a day

## 2020-08-28 NOTE — PROGRESS NOTE ADULT - PROBLEM SELECTOR PLAN 2
Supportive care  Severe protein calorie malnutrition
Supportive care
Supportive care  Severe protein calorie malnutrition noted  ARF: Sec to poor oral intake and dehydration is resolved now.
Supportive care  Severe protein calorie malnutrition noted  ARF: Sec to poor oral intake and dehydration is resolved now.

## 2020-08-28 NOTE — CONSULT NOTE ADULT - SUBJECTIVE AND OBJECTIVE BOX
HPI:  89 yo female with htn, hypothyroidism, DM, presenting for concern of wt loss and decreased appetite x 4 weeks. Pt had out pt abdominal US with PMD Dr. Génesis Krause which was significant for hepatic and pancreatic lesions, was scheduled for abd MRI today however was too weak to make the appointment. Pt stated she was only able to tolerate some water but no other food/liquid, has been generally constipated. She denied abdominal pain, nausea or vomiting and is generally weak. no chest pain shortness of breath or bloody stools, no hx of EtOH or smoking. EUS showed " a round, well circumscribed heterogeneous hypoechoic lesion in the tail of the pancreas. The lesion had cystic components. It measured approximately 2.2cm in max diameter. The lesion appeared to abut the splenic vein but there was no invasion. CT Abdomen and Pelvis No Cont also showed: "A soft tissue mass in the proximal pancreatic tail is consistent with neoplasm." As per the primary team notes: "Her condition was discussed with her son, Hair. Results of the EUS were notified. He is aware of whipple surgery and opted not to go through.  Palliative evaluation was agreed upon." Palliative care was called to further discuss services, GOC, and ACP.       PERTINENT PM/SXH:   HTN (hypertension)    H/O: hysterectomy    FAMILY HISTORY:    ITEMS NOT CHECKED ARE NOT PRESENT    SOCIAL HISTORY:   Significant other/partner[ ]  Children[ ]  Gnosticism/Spirituality:  Substance hx:  [ ]   Tobacco hx:  [ ]   Alcohol hx: [ ]   Home Opioid hx:  [ ] I-Stop Reference No:  Living Situation: [ ]Home  [ ]Long term care  [ ]Rehab [ ]Other   Prior to admission, pt resided alone in a private house with 4 steps to  enter.  She ambulated and performed all ADLs independently.  She owns a cane  that she does not utilize  She was not receiving any HHA or P/T services at  home.  Her son/caregiver Hair lives in [place].  Hair states he will be able  to provide patient with transportation upon discharge.  ADVANCE DIRECTIVES:    DNR  MOLST  [ ]  Living Will  [ ]   DECISION MAKER(s):  [ ] Health Care Proxy(s)  [ ] Surrogate(s)  [ ] Guardian           Name(s): Phone Number(s):    BASELINE (I)ADL(s) (prior to admission):  [place]: [ ]Total  [ ] Moderate [ ]Dependent    Allergies    No Known Allergies    Intolerances    MEDICATIONS  (STANDING):  dextrose 5%. 1000 milliLiter(s) (50 mL/Hr) IV Continuous <Continuous>  dextrose 50% Injectable 12.5 Gram(s) IV Push once  dextrose 50% Injectable 25 Gram(s) IV Push once  dextrose 50% Injectable 25 Gram(s) IV Push once  diltiazem    milliGRAM(s) Oral daily  heparin   Injectable 5000 Unit(s) SubCutaneous every 12 hours  insulin lispro (HumaLOG) corrective regimen sliding scale   SubCutaneous three times a day before meals  levothyroxine 50 MICROGram(s) Oral daily  losartan 50 milliGRAM(s) Oral daily  senna 2 Tablet(s) Oral at bedtime  sodium chloride 0.9%. 1000 milliLiter(s) (65 mL/Hr) IV Continuous <Continuous>    MEDICATIONS  (PRN):  artificial tears (preservative free) Ophthalmic Solution 1 Drop(s) Both EYES four times a day PRN Dry Eyes  dextrose 40% Gel 15 Gram(s) Oral once PRN Blood Glucose LESS THAN 70 milliGRAM(s)/deciliter  glucagon  Injectable 1 milliGRAM(s) IntraMuscular once PRN Glucose LESS THAN 70 milligrams/deciliter    PRESENT SYMPTOMS: [ ]Unable to obtain due to poor mentation   Source if other than patient:  [ ]Family   [ ]Team     Pain: [ ]yes [ ]no  QOL impact -   Location -                    Aggravating factors -  Quality -  Radiation -  Timing-  Severity (0-10 scale):  Minimal acceptable level (0-10 scale):     CPOT:    https://www.[facility].org/getattachment/[email or web address]/Critical-Care-Pain-Observation-Tool-(CPOT)      PAIN AD Score:     http://geriatrictoolkit.[place].[facility]/cog/painad.pdf (press ctrl +  left click to view)    Dyspnea:                           [ ]Mild [ ]Moderate [ ]Severe  Anxiety:                             [ ]Mild [ ]Moderate [ ]Severe  Fatigue:                             [ ]Mild [ ]Moderate [ ]Severe  Nausea:                             [ ]Mild [ ]Moderate [ ]Severe  Loss of appetite:              [ ]Mild [ ]Moderate [ ]Severe  Constipation:                    [ ]Mild [ ]Moderate [ ]Severe    Other Symptoms:  [ ]All other review of systems negative     Palliative Performance Status Version 2:         %    http://npcrc.org/files/news/palliative_performance_scale_ppsv2.pdf  PHYSICAL EXAM:  Vital Signs Last 24 Hrs  T(C): 37.1 (28 Aug 2020 11:54), Max: 37.2 (27 Aug 2020 20:46)  T(F): 98.7 (28 Aug 2020 11:54), Max: 99 (27 Aug 2020 20:46)  HR: 61 (28 Aug 2020 11:54) (61 - 86)  BP: 120/67 (28 Aug 2020 11:54) (120/67 - 153/63)  BP(mean): 134 (27 Aug 2020 14:28) (134 - 134)  RR: 18 (28 Aug 2020 11:54) (14 - 18)  SpO2: 97% (28 Aug 2020 11:54) (96% - 100%) I&O's Summary    27 Aug 2020 07:01  -  28 Aug 2020 07:00  --------------------------------------------------------  IN: 240 mL / OUT: 0 mL / NET: 240 mL      GENERAL:  [ ]Alert  [ ]Oriented x   [ ]Lethargic  [ ]Cachexia  [ ]Unarousable  [ ]Verbal  [ ]Non-Verbal  Behavioral:   [ ] Anxiety  [ ] Delirium [ ] Agitation [ ] Other  HEENT:  [ ]Normal   [ ]Dry mouth   [ ]ET Tube/Trach  [ ]Oral lesions  PULMONARY:   [ ]Clear [ ]Tachypnea  [ ]Audible excessive secretions   [ ]Rhonchi        [ ]Right [ ]Left [ ]Bilateral  [ ]Crackles        [ ]Right [ ]Left [ ]Bilateral  [ ]Wheezing     [ ]Right [ ]Left [ ]Bilateral  [ ]Diminished breath sounds [ ]right [ ]left [ ]bilateral  CARDIOVASCULAR:    [ ]Regular [ ]Irregular [ ]Tachy  [ ]Raghu [ ]Murmur [ ]Other  GASTROINTESTINAL:  [ ]Soft  [ ]Distended   [ ]+BS  [ ]Non tender [ ]Tender  [ ]PEG [ ]OGT/ NGT  Last BM:     GENITOURINARY:  [ ]Normal [ ] Incontinent   [ ]Oliguria/Anuria   [ ]Hui  MUSCULOSKELETAL:   [ ]Normal   [ ]Weakness  [ ]Bed/Wheelchair bound [ ]Edema  NEUROLOGIC:   [ ]No focal deficits  [ ]Cognitive impairment  [ ]Dysphagia [ ]Dysarthria [ ]Paresis [ ]Other   SKIN:   [ ]Normal    [ ]Rash  [ ]Pressure ulcer(s)       Present on admission [ ]y [ ]n    CRITICAL CARE:  [ ] Shock Present  [ ]Septic [ ]Cardiogenic [ ]Neurologic [ ]Hypovolemic  [ ]  Vasopressors [ ]  Inotropes   [ ]Respiratory failure present [ ]Mechanical ventilation [ ]Non-invasive ventilatory support [ ]High flow    [ ]Acute  [ ]Chronic [ ]Hypoxic  [ ]Hypercarbic [ ]Other  [ ]Other organ failure     LABS:                        10.3   12.67 )-----------( 186      ( 28 Aug 2020 05:59 )             30.6   08-28    143  |  108  |  10  ----------------------------<  121<H>  3.1<L>   |  20<L>  |  0.81    Ca    8.3<L>      28 Aug 2020 06:00  Phos  2.7     08-28  Mg     1.3     08-28          RADIOLOGY & ADDITIONAL STUDIES:  As per HPI   PROTEIN CALORIE MALNUTRITION PRESENT: [ ]mild [ ]moderate [ ]severe [ ]underweight [ ]morbid obesity  https://www.andeal.org/vault/2440/web/files/ONC/Table_Clinical%20Characteristics%20to%20Document%20Malnutrition-White%20JV%20et%20al%847601.pdf    Height (cm): 154.94 (08-27-20 @ 12:56)  Weight (kg): 65.5 (08-27-20 @ 12:56)  BMI (kg/m2): 27.3 (08-27-20 @ 12:56)    [ ]PPSV2 < or = to 30% [ ]significant weight loss  [ ]poor nutritional intake  [ ]anasarca     Albumin, Serum: 3.6 g/dL (08-25-20 @ 06:44)   [ ]Artificial Nutrition      REFERRALS:   [ ]Chaplaincy  [ ]Hospice  [ ]Child Life  [ ]Social Work  [ ]Case management [ ]Holistic Therapy     Goals of Care Document: HPI:  89 yo female with htn, hypothyroidism, DM, presenting for concern of wt loss and decreased appetite x 4 weeks. Pt had out pt abdominal US with PMD Dr. Génesis Krause which was significant for hepatic and pancreatic lesions, was scheduled for abd MRI today however was too weak to make the appointment. Pt stated she was only able to tolerate some water but no other food/liquid, has been generally constipated. She denied abdominal pain, nausea or vomiting and is generally weak. no chest pain shortness of breath or bloody stools, no hx of EtOH or smoking. EUS showed " a round, well circumscribed heterogeneous hypoechoic lesion in the tail of the pancreas. The lesion had cystic components. It measured approximately 2.2cm in max diameter. The lesion appeared to abut the splenic vein but there was no invasion. CT Abdomen and Pelvis No Cont also showed: "A soft tissue mass in the proximal pancreatic tail is consistent with neoplasm." As per the primary team notes: "Her condition was discussed with her son, Hair. Results of the EUS were notified. He is aware of whipple surgery and opted not to go through.  Palliative evaluation was agreed upon." Palliative care was called to further discuss services, GOC, and ACP.       PERTINENT PM/SXH:   HTN (hypertension)    H/O: hysterectomy    FAMILY HISTORY:    ITEMS NOT CHECKED ARE NOT PRESENT    SOCIAL HISTORY:   Significant other/partner[ ]   Children[x ] 3  Zoroastrian/Spirituality: Shinto   Substance hx:  [ ]   Tobacco hx:  [ ]   Alcohol hx: [ ]   Home Opioid hx:  [ ] I-Stop Reference No:  Living Situation: [x ]Home  [ ]Long term care  [ ]Rehab [ ]Other   Prior to admission, pt resided alone in a private house with 4 steps to  enter.  She ambulated and performed all ADLs independently.  She owns a cane  that she does not utilize  She was not receiving any HHA or P/T services at  home.  Her son/caregiver Hair lives in [place].  Hair states he will be able  to provide patient with transportation upon discharge.  ADVANCE DIRECTIVES:    DNR  MOLST  [ ]  Living Will  [ ]   DECISION MAKER(s):  [x ] Health Care Proxy(s)  [ ] Surrogate(s)  [ ] Guardian           Name(s): Phone Number(s): SonHair [phone]    BASELINE (I)ADL(s) (prior to admission):  [place]: [ x]Total  [ ] Moderate [ ]Dependent    Allergies    No Known Allergies    Intolerances    MEDICATIONS  (STANDING):  dextrose 5%. 1000 milliLiter(s) (50 mL/Hr) IV Continuous <Continuous>  dextrose 50% Injectable 12.5 Gram(s) IV Push once  dextrose 50% Injectable 25 Gram(s) IV Push once  dextrose 50% Injectable 25 Gram(s) IV Push once  diltiazem    milliGRAM(s) Oral daily  heparin   Injectable 5000 Unit(s) SubCutaneous every 12 hours  insulin lispro (HumaLOG) corrective regimen sliding scale   SubCutaneous three times a day before meals  levothyroxine 50 MICROGram(s) Oral daily  losartan 50 milliGRAM(s) Oral daily  senna 2 Tablet(s) Oral at bedtime  sodium chloride 0.9%. 1000 milliLiter(s) (65 mL/Hr) IV Continuous <Continuous>    MEDICATIONS  (PRN):  artificial tears (preservative free) Ophthalmic Solution 1 Drop(s) Both EYES four times a day PRN Dry Eyes  dextrose 40% Gel 15 Gram(s) Oral once PRN Blood Glucose LESS THAN 70 milliGRAM(s)/deciliter  glucagon  Injectable 1 milliGRAM(s) IntraMuscular once PRN Glucose LESS THAN 70 milligrams/deciliter    PRESENT SYMPTOMS: [ ]Unable to obtain due to poor mentation   Source if other than patient:  [ ]Family   [ ]Team     Pain: [ ]yes [x ]no  QOL impact -   Location -                    Aggravating factors -  Quality -  Radiation -  Timing-  Severity (0-10 scale):  Minimal acceptable level (0-10 scale):     CPOT:    https://www.[facility].org/getattachment/[email or web address]/Critical-Care-Pain-Observation-Tool-(CPOT)      PAIN AD Score:     http://geriatrictoolkit.[place].edu/cog/painad.pdf (press ctrl +  left click to view)    Dyspnea:                           [ ]Mild [ ]Moderate [ ]Severe  Anxiety:                             [ ]Mild [ ]Moderate [ ]Severe  Fatigue:                             [ ]Mild [ ]Moderate [ ]Severe  Nausea:                             [ ]Mild [ ]Moderate [ ]Severe  Loss of appetite:              [ ]Mild [ ]Moderate [ ]Severe  Constipation:                    [ ]Mild [x ]Moderate [ ]Severe    Other Symptoms:  [x ]All other review of systems negative but 15 # weight loss and poor appetite for the last few weeks.     Palliative Performance Status Version 2:   80     %    http://npcrc.org/files/news/palliative_performance_scale_ppsv2.pdf  PHYSICAL EXAM:  Vital Signs Last 24 Hrs  T(C): 37.1 (28 Aug 2020 11:54), Max: 37.2 (27 Aug 2020 20:46)  T(F): 98.7 (28 Aug 2020 11:54), Max: 99 (27 Aug 2020 20:46)  HR: 61 (28 Aug 2020 11:54) (61 - 86)  BP: 120/67 (28 Aug 2020 11:54) (120/67 - 153/63)  BP(mean): 134 (27 Aug 2020 14:28) (134 - 134)  RR: 18 (28 Aug 2020 11:54) (14 - 18)  SpO2: 97% (28 Aug 2020 11:54) (96% - 100%) I&O's Summary    27 Aug 2020 07:01  -  28 Aug 2020 07:00  --------------------------------------------------------  IN: 240 mL / OUT: 0 mL / NET: 240 mL      GENERAL:  [x ]Alert  [x ]Oriented x 3  [ ]Lethargic  [ ]Cachexia  [ ]Unarousable  [x ]Verbal  [ ]Non-Verbal  Behavioral:   [ ] Anxiety  [ ] Delirium [ ] Agitation [ ] Other  HEENT:  [x ]Normal   [ ]Dry mouth   [ ]ET Tube/Trach  [ ]Oral lesions  PULMONARY:   [x ]Clear [ ]Tachypnea  [ ]Audible excessive secretions   [ ]Rhonchi        [ ]Right [ ]Left [ ]Bilateral  [ ]Crackles        [ ]Right [ ]Left [ ]Bilateral  [ ]Wheezing     [ ]Right [ ]Left [ ]Bilateral  [ ]Diminished breath sounds [ ]right [ ]left [ ]bilateral  CARDIOVASCULAR:    [ x]Regular [ ]Irregular [ ]Tachy  [ ]Raghu [ ]Murmur [ ]Other  GASTROINTESTINAL:  [ x]Soft  [ ]Distended   [x ]+BS  [x ]Non tender [ ]Tender  [ ]PEG [ ]OGT/ NGT  Last BM:     GENITOURINARY:  [x ]Normal [ ] Incontinent   [ ]Oliguria/Anuria   [ ]Hui  MUSCULOSKELETAL:   [x ]Normal   [ ]Weakness  [ ]Bed/Wheelchair bound [ ]Edema  NEUROLOGIC:   [x ]No focal deficits  [ ]Cognitive impairment  [ ]Dysphagia [ ]Dysarthria [ ]Paresis [ ]Other   SKIN:   [x ]Normal    [ ]Rash  [ ]Pressure ulcer(s)       Present on admission [ ]y [ ]n    CRITICAL CARE:  [ ] Shock Present  [ ]Septic [ ]Cardiogenic [ ]Neurologic [ ]Hypovolemic  [ ]  Vasopressors [ ]  Inotropes   [ ]Respiratory failure present [ ]Mechanical ventilation [ ]Non-invasive ventilatory support [ ]High flow    [ ]Acute  [ ]Chronic [ ]Hypoxic  [ ]Hypercarbic [ ]Other  [ ]Other organ failure     LABS:                        10.3   12.67 )-----------( 186      ( 28 Aug 2020 05:59 )             30.6   08-28    143  |  108  |  10  ----------------------------<  121<H>  3.1<L>   |  20<L>  |  0.81    Ca    8.3<L>      28 Aug 2020 06:00  Phos  2.7     08-28  Mg     1.3     08-28          RADIOLOGY & ADDITIONAL STUDIES:  As per HPI   PROTEIN CALORIE MALNUTRITION PRESENT: [ ]mild [ ]moderate [ ]severe [ ]underweight [ ]morbid obesity  https://www.andeal.org/vault/2440/web/files/ONC/Table_Clinical%20Characteristics%20to%20Document%20Malnutrition-White%20JV%20et%20al%607814.pdf    Height (cm): 154.94 (08-27-20 @ 12:56)  Weight (kg): 65.5 (08-27-20 @ 12:56)  BMI (kg/m2): 27.3 (08-27-20 @ 12:56)    [ ]PPSV2 < or = to 30% [ ]significant weight loss  [ ]poor nutritional intake  [ ]anasarca     Albumin, Serum: 3.6 g/dL (08-25-20 @ 06:44)   [ ]Artificial Nutrition      REFERRALS:   [ ]Chaplaincy  [ ]Hospice  [ ]Child Life  [ ]Social Work  [ ]Case management [ ]Holistic Therapy     Goals of Care Document:

## 2020-08-29 ENCOUNTER — TRANSCRIPTION ENCOUNTER (OUTPATIENT)
Age: 85
End: 2020-08-29

## 2020-08-29 VITALS
TEMPERATURE: 98 F | OXYGEN SATURATION: 98 % | SYSTOLIC BLOOD PRESSURE: 148 MMHG | HEART RATE: 64 BPM | DIASTOLIC BLOOD PRESSURE: 98 MMHG | RESPIRATION RATE: 18 BRPM

## 2020-08-29 LAB — GLUCOSE BLDC GLUCOMTR-MCNC: 122 MG/DL — HIGH (ref 70–99)

## 2020-08-29 PROCEDURE — 74176 CT ABD & PELVIS W/O CONTRAST: CPT

## 2020-08-29 PROCEDURE — 80048 BASIC METABOLIC PNL TOTAL CA: CPT

## 2020-08-29 PROCEDURE — 88305 TISSUE EXAM BY PATHOLOGIST: CPT

## 2020-08-29 PROCEDURE — 83735 ASSAY OF MAGNESIUM: CPT

## 2020-08-29 PROCEDURE — 74183 MRI ABD W/O CNTR FLWD CNTR: CPT

## 2020-08-29 PROCEDURE — A9585: CPT

## 2020-08-29 PROCEDURE — 83690 ASSAY OF LIPASE: CPT

## 2020-08-29 PROCEDURE — 86301 IMMUNOASSAY TUMOR CA 19-9: CPT

## 2020-08-29 PROCEDURE — 97161 PT EVAL LOW COMPLEX 20 MIN: CPT

## 2020-08-29 PROCEDURE — 84100 ASSAY OF PHOSPHORUS: CPT

## 2020-08-29 PROCEDURE — 97116 GAIT TRAINING THERAPY: CPT

## 2020-08-29 PROCEDURE — 86304 IMMUNOASSAY TUMOR CA 125: CPT

## 2020-08-29 PROCEDURE — 83036 HEMOGLOBIN GLYCOSYLATED A1C: CPT

## 2020-08-29 PROCEDURE — 85730 THROMBOPLASTIN TIME PARTIAL: CPT

## 2020-08-29 PROCEDURE — 82962 GLUCOSE BLOOD TEST: CPT

## 2020-08-29 PROCEDURE — 97110 THERAPEUTIC EXERCISES: CPT

## 2020-08-29 PROCEDURE — 80053 COMPREHEN METABOLIC PANEL: CPT

## 2020-08-29 PROCEDURE — U0003: CPT

## 2020-08-29 PROCEDURE — 88173 CYTOPATH EVAL FNA REPORT: CPT

## 2020-08-29 PROCEDURE — 88172 CYTP DX EVAL FNA 1ST EA SITE: CPT

## 2020-08-29 PROCEDURE — 85610 PROTHROMBIN TIME: CPT

## 2020-08-29 PROCEDURE — 85027 COMPLETE CBC AUTOMATED: CPT

## 2020-08-29 PROCEDURE — 71045 X-RAY EXAM CHEST 1 VIEW: CPT

## 2020-08-29 RX ORDER — LOSARTAN/HYDROCHLOROTHIAZIDE 100MG-25MG
1 TABLET ORAL
Qty: 0 | Refills: 0 | DISCHARGE

## 2020-08-29 RX ORDER — LOSARTAN POTASSIUM 100 MG/1
1 TABLET, FILM COATED ORAL
Qty: 30 | Refills: 0
Start: 2020-08-29 | End: 2020-09-27

## 2020-08-29 RX ADMIN — Medication 240 MILLIGRAM(S): at 06:45

## 2020-08-29 RX ADMIN — Medication 50 MICROGRAM(S): at 06:45

## 2020-08-29 RX ADMIN — HEPARIN SODIUM 5000 UNIT(S): 5000 INJECTION INTRAVENOUS; SUBCUTANEOUS at 06:45

## 2020-08-29 RX ADMIN — POLYETHYLENE GLYCOL 3350 17 GRAM(S): 17 POWDER, FOR SOLUTION ORAL at 11:13

## 2020-08-29 RX ADMIN — LOSARTAN POTASSIUM 50 MILLIGRAM(S): 100 TABLET, FILM COATED ORAL at 06:45

## 2020-08-29 NOTE — DISCHARGE NOTE NURSING/CASE MANAGEMENT/SOCIAL WORK - PATIENT PORTAL LINK FT
You can access the FollowMyHealth Patient Portal offered by [facility] by registering at the following website: [email or web address]. By joining [facility]’s FollowMyHealth portal, you will also be able to view your health information using other applications (apps) compatible with our system.

## 2020-08-29 NOTE — DISCHARGE NOTE NURSING/CASE MANAGEMENT/SOCIAL WORK - NSDCFUADDAPPT_GEN_ALL_CORE_FT
Follow up with your primary care doctor and the doctors with hospice as no active treatment plan is elected to be made at this time.

## 2021-03-25 ENCOUNTER — INPATIENT (INPATIENT)
Facility: HOSPITAL | Age: 86
LOS: 5 days | Discharge: HOSPICE | DRG: 177 | End: 2021-03-31
Attending: STUDENT IN AN ORGANIZED HEALTH CARE EDUCATION/TRAINING PROGRAM | Admitting: HOSPITALIST
Payer: MEDICARE

## 2021-03-25 VITALS
HEART RATE: 99 BPM | HEIGHT: 61 IN | OXYGEN SATURATION: 99 % | SYSTOLIC BLOOD PRESSURE: 105 MMHG | TEMPERATURE: 100 F | DIASTOLIC BLOOD PRESSURE: 72 MMHG | WEIGHT: 139.99 LBS | RESPIRATION RATE: 18 BRPM

## 2021-03-25 DIAGNOSIS — Z90.710 ACQUIRED ABSENCE OF BOTH CERVIX AND UTERUS: Chronic | ICD-10-CM

## 2021-03-25 LAB
ALBUMIN SERPL ELPH-MCNC: 3.8 G/DL — SIGNIFICANT CHANGE UP (ref 3.3–5)
ALP SERPL-CCNC: 69 U/L — SIGNIFICANT CHANGE UP (ref 40–120)
ALT FLD-CCNC: 18 U/L — SIGNIFICANT CHANGE UP (ref 10–45)
ANION GAP SERPL CALC-SCNC: 15 MMOL/L — SIGNIFICANT CHANGE UP (ref 5–17)
ANION GAP SERPL CALC-SCNC: 22 MMOL/L — HIGH (ref 5–17)
ANION GAP SERPL CALC-SCNC: 22 MMOL/L — HIGH (ref 5–17)
APTT BLD: 28.8 SEC — SIGNIFICANT CHANGE UP (ref 27.5–35.5)
AST SERPL-CCNC: 32 U/L — SIGNIFICANT CHANGE UP (ref 10–40)
B-OH-BUTYR SERPL-SCNC: 1.8 MMOL/L — HIGH
B-OH-BUTYR SERPL-SCNC: 3.1 MMOL/L — HIGH
BASOPHILS # BLD AUTO: 0.01 K/UL — SIGNIFICANT CHANGE UP (ref 0–0.2)
BASOPHILS NFR BLD AUTO: 0.1 % — SIGNIFICANT CHANGE UP (ref 0–2)
BILIRUB SERPL-MCNC: 0.4 MG/DL — SIGNIFICANT CHANGE UP (ref 0.2–1.2)
BUN SERPL-MCNC: 28 MG/DL — HIGH (ref 7–23)
BUN SERPL-MCNC: 28 MG/DL — HIGH (ref 7–23)
BUN SERPL-MCNC: 29 MG/DL — HIGH (ref 7–23)
CALCIUM SERPL-MCNC: 10.3 MG/DL — SIGNIFICANT CHANGE UP (ref 8.4–10.5)
CALCIUM SERPL-MCNC: 7.4 MG/DL — LOW (ref 8.4–10.5)
CALCIUM SERPL-MCNC: 9.2 MG/DL — SIGNIFICANT CHANGE UP (ref 8.4–10.5)
CHLORIDE SERPL-SCNC: 104 MMOL/L — SIGNIFICANT CHANGE UP (ref 96–108)
CHLORIDE SERPL-SCNC: 95 MMOL/L — LOW (ref 96–108)
CHLORIDE SERPL-SCNC: 96 MMOL/L — SIGNIFICANT CHANGE UP (ref 96–108)
CO2 SERPL-SCNC: 13 MMOL/L — LOW (ref 22–31)
CO2 SERPL-SCNC: 16 MMOL/L — LOW (ref 22–31)
CO2 SERPL-SCNC: 16 MMOL/L — LOW (ref 22–31)
CREAT SERPL-MCNC: 0.86 MG/DL — SIGNIFICANT CHANGE UP (ref 0.5–1.3)
CREAT SERPL-MCNC: 0.99 MG/DL — SIGNIFICANT CHANGE UP (ref 0.5–1.3)
CREAT SERPL-MCNC: 1.05 MG/DL — SIGNIFICANT CHANGE UP (ref 0.5–1.3)
EOSINOPHIL # BLD AUTO: 0 K/UL — SIGNIFICANT CHANGE UP (ref 0–0.5)
EOSINOPHIL NFR BLD AUTO: 0 % — SIGNIFICANT CHANGE UP (ref 0–6)
GAS PNL BLDV: SIGNIFICANT CHANGE UP
GLUCOSE SERPL-MCNC: 348 MG/DL — HIGH (ref 70–99)
GLUCOSE SERPL-MCNC: 397 MG/DL — HIGH (ref 70–99)
GLUCOSE SERPL-MCNC: 415 MG/DL — HIGH (ref 70–99)
HCT VFR BLD CALC: 44 % — SIGNIFICANT CHANGE UP (ref 34.5–45)
HGB BLD-MCNC: 13.4 G/DL — SIGNIFICANT CHANGE UP (ref 11.5–15.5)
IMM GRANULOCYTES NFR BLD AUTO: 0.3 % — SIGNIFICANT CHANGE UP (ref 0–1.5)
INR BLD: 1.09 RATIO — SIGNIFICANT CHANGE UP (ref 0.88–1.16)
LACTATE BLDV-MCNC: 1.4 MMOL/L — SIGNIFICANT CHANGE UP (ref 0.7–2)
LYMPHOCYTES # BLD AUTO: 0.74 K/UL — LOW (ref 1–3.3)
LYMPHOCYTES # BLD AUTO: 10.5 % — LOW (ref 13–44)
MCHC RBC-ENTMCNC: 30.5 GM/DL — LOW (ref 32–36)
MCHC RBC-ENTMCNC: 30.7 PG — SIGNIFICANT CHANGE UP (ref 27–34)
MCV RBC AUTO: 100.9 FL — HIGH (ref 80–100)
MONOCYTES # BLD AUTO: 0.41 K/UL — SIGNIFICANT CHANGE UP (ref 0–0.9)
MONOCYTES NFR BLD AUTO: 5.8 % — SIGNIFICANT CHANGE UP (ref 2–14)
NEUTROPHILS # BLD AUTO: 5.86 K/UL — SIGNIFICANT CHANGE UP (ref 1.8–7.4)
NEUTROPHILS NFR BLD AUTO: 83.3 % — HIGH (ref 43–77)
NRBC # BLD: 0 /100 WBCS — SIGNIFICANT CHANGE UP (ref 0–0)
PLATELET # BLD AUTO: 121 K/UL — LOW (ref 150–400)
POTASSIUM SERPL-MCNC: 3.8 MMOL/L — SIGNIFICANT CHANGE UP (ref 3.5–5.3)
POTASSIUM SERPL-MCNC: 4.1 MMOL/L — SIGNIFICANT CHANGE UP (ref 3.5–5.3)
POTASSIUM SERPL-MCNC: 4.4 MMOL/L — SIGNIFICANT CHANGE UP (ref 3.5–5.3)
POTASSIUM SERPL-SCNC: 3.8 MMOL/L — SIGNIFICANT CHANGE UP (ref 3.5–5.3)
POTASSIUM SERPL-SCNC: 4.1 MMOL/L — SIGNIFICANT CHANGE UP (ref 3.5–5.3)
POTASSIUM SERPL-SCNC: 4.4 MMOL/L — SIGNIFICANT CHANGE UP (ref 3.5–5.3)
PROT SERPL-MCNC: 7 G/DL — SIGNIFICANT CHANGE UP (ref 6–8.3)
PROTHROM AB SERPL-ACNC: 13 SEC — SIGNIFICANT CHANGE UP (ref 10.6–13.6)
RBC # BLD: 4.36 M/UL — SIGNIFICANT CHANGE UP (ref 3.8–5.2)
RBC # FLD: 13.2 % — SIGNIFICANT CHANGE UP (ref 10.3–14.5)
SARS-COV-2 RNA SPEC QL NAA+PROBE: DETECTED
SODIUM SERPL-SCNC: 132 MMOL/L — LOW (ref 135–145)
SODIUM SERPL-SCNC: 133 MMOL/L — LOW (ref 135–145)
SODIUM SERPL-SCNC: 134 MMOL/L — LOW (ref 135–145)
TROPONIN T, HIGH SENSITIVITY RESULT: 21 NG/L — SIGNIFICANT CHANGE UP (ref 0–51)
WBC # BLD: 7.04 K/UL — SIGNIFICANT CHANGE UP (ref 3.8–10.5)
WBC # FLD AUTO: 7.04 K/UL — SIGNIFICANT CHANGE UP (ref 3.8–10.5)

## 2021-03-25 PROCEDURE — 74177 CT ABD & PELVIS W/CONTRAST: CPT | Mod: 26,MA

## 2021-03-25 PROCEDURE — 93010 ELECTROCARDIOGRAM REPORT: CPT

## 2021-03-25 PROCEDURE — 99291 CRITICAL CARE FIRST HOUR: CPT

## 2021-03-25 PROCEDURE — 71045 X-RAY EXAM CHEST 1 VIEW: CPT | Mod: 26

## 2021-03-25 RX ORDER — SODIUM CHLORIDE 9 MG/ML
1000 INJECTION, SOLUTION INTRAVENOUS
Refills: 0 | Status: DISCONTINUED | OUTPATIENT
Start: 2021-03-25 | End: 2021-03-26

## 2021-03-25 RX ORDER — ONDANSETRON 8 MG/1
4 TABLET, FILM COATED ORAL ONCE
Refills: 0 | Status: COMPLETED | OUTPATIENT
Start: 2021-03-25 | End: 2021-03-25

## 2021-03-25 RX ORDER — ACETAMINOPHEN 500 MG
975 TABLET ORAL ONCE
Refills: 0 | Status: COMPLETED | OUTPATIENT
Start: 2021-03-25 | End: 2021-03-25

## 2021-03-25 RX ORDER — SODIUM CHLORIDE 9 MG/ML
1000 INJECTION INTRAMUSCULAR; INTRAVENOUS; SUBCUTANEOUS ONCE
Refills: 0 | Status: COMPLETED | OUTPATIENT
Start: 2021-03-25 | End: 2021-03-25

## 2021-03-25 RX ADMIN — SODIUM CHLORIDE 100 MILLILITER(S): 9 INJECTION, SOLUTION INTRAVENOUS at 21:46

## 2021-03-25 RX ADMIN — SODIUM CHLORIDE 1000 MILLILITER(S): 9 INJECTION INTRAMUSCULAR; INTRAVENOUS; SUBCUTANEOUS at 18:11

## 2021-03-25 RX ADMIN — Medication 975 MILLIGRAM(S): at 17:29

## 2021-03-25 RX ADMIN — SODIUM CHLORIDE 1000 MILLILITER(S): 9 INJECTION INTRAMUSCULAR; INTRAVENOUS; SUBCUTANEOUS at 19:35

## 2021-03-25 RX ADMIN — Medication 975 MILLIGRAM(S): at 18:30

## 2021-03-25 RX ADMIN — ONDANSETRON 4 MILLIGRAM(S): 8 TABLET, FILM COATED ORAL at 18:11

## 2021-03-25 NOTE — ED ADULT NURSE NOTE - NS PRO AD PATIENT TYPE ON CHART
Patient Name: Ada Cleveland  Specialist or PCP: Briseida  Symptoms: Lower abdomen pain/SOB/Chest pain  7M   Best Availability: ASAP  Callback Number: [phone]    Thank you,  Alexandra Garay   Health Care Proxy (HCP)

## 2021-03-25 NOTE — ED ADULT NURSE REASSESSMENT NOTE - NS ED NURSE REASSESS COMMENT FT1
Report received from BABAR Pena. Upon assessment, pt is AO x 4, disoriented to birthdate, speaking in full and complete sentences. Pt updated on plan of care, vbg to be repeated after IVF. Fall and safety precautions in place, call bell within reach.

## 2021-03-25 NOTE — CONSULT NOTE ADULT - ATTENDING COMMENTS
89-yo F w/ PMH of T2DM (A1C 12.6 Aug 2020), HTN, hypothyroidism, and pancreatic mass (2020), presenting for 1-week history of lethargy and constipation, found to have mild DKA in the setting of poor PO intake and increased pancreatic mass compared to Aug 2020. Sub acute decompensation likely from Mild COVID infection.   DKA already mostly resolved in the after fluid resuscitation. Bicarb over 20 and gap closed, does not need Insulin drip would start wieght based insulin.  Currently no reason to given remdesivir or dexamethasone.  Would discuss with ID if patient can get Monoclonoal antibody? as if she was not admitted patient would be a good candidate.     Reconsult PRN

## 2021-03-25 NOTE — ED PROVIDER NOTE - PROGRESS NOTE DETAILS
Wayne: Patient potassium hemolyzed. will repeat, c/w ivf. Jaycee: repeat potassium is hemolyzed. Wayne: repeat potassium 3.6. beta hydroxybutrate improved. anion gap closed. willl consult micu to evaluate and start insulin. mild dka now. will admit to medicine floor. will dose insulin subq. patient covid+. will admit.

## 2021-03-25 NOTE — ED ADULT NURSE NOTE - CHIEF COMPLAINT QUOTE
ams x 1 week, not taking meds, not eating, weakness constipation  f/s=398, noot known to be diabetic    son Max [phone]

## 2021-03-25 NOTE — CONSULT NOTE ADULT - SUBJECTIVE AND OBJECTIVE BOX
CHIEF COMPLAINT:  DKA    HPI:  89-yo F w/ PMH of T2DM (A1C 12.6 Aug 2020), HTN, hypothyroidism, and pancreatic mass (2020), presenting for 1-week history of lethargy and constipation. Patient reports poor appetite over the past 1 week. Stated she had not eaten anything for a week, and no BM >1 week. Endorses constipation and abdominal discomfort. No fever, chills, thirst, nausea, vomiting, chest pain, dysuria, or coughs. No sick contacts. Lives with son. Patient is not on insulin. Of note, patient was found to have pancreatic tail mass last year on CT, however refused no further workup and declined surgical w/u.    In ED, Tmax 101.5, HR 72-99, /72 - 120/60, RR 19-20, sat 99% RA. COVID+. Initially AG 22 with bicarb on BMP 16. BHB 3.1. Improved to bicarb 20, BHB 1.8, AG 15,     PAST MEDICAL & SURGICAL HISTORY:  HTN (hypertension)    H/O: hysterectomy        FAMILY HISTORY:  No FHx    SOCIAL HISTORY:  Smoking: No tobacco history  EtOH Use: No EtOH use    Allergies    No Known Allergies    Intolerances        HOME MEDICATIONS:    REVIEW OF SYSTEMS:  Constitutional: NAD; weakness  Eyes: No blurry vision  ENT: No throat pain  CV: No chest pain  Resp: No SOB or wheezing  GI: +constipation  : No dysuria  MSK: No joint pain  Integumentary: No rash  Neurological: No headache or lightheadedness  [X] All other systems negative  [ ] Unable to assess ROS because ________    OBJECTIVE:  ICU Vital Signs Last 24 Hrs  T(C): 36.4 (25 Mar 2021 19:40), Max: 38.6 (25 Mar 2021 16:57)  T(F): 97.6 (25 Mar 2021 19:40), Max: 101.5 (25 Mar 2021 16:57)  HR: 72 (25 Mar 2021 19:40) (72 - 99)  BP: 120/60 (25 Mar 2021 19:40) (105/72 - 126/70)  BP(mean): --  ABP: --  ABP(mean): --  RR: 20 (25 Mar 2021 19:40) (18 - 20)  SpO2: 99% (25 Mar 2021 19:40) (97% - 99%)        CAPILLARY BLOOD GLUCOSE      POCT Blood Glucose.: 398 mg/dL (25 Mar 2021 15:53)      PHYSICAL EXAM:  General: NAD  HEENT: PERRLA, EOMI  Lymph Nodes: No anterior cervical LN  Neck: Supple  Respiratory:  CTA b/l; no wheezes; no coughs  Cardiovascular: +S1/S2. RRR; no murmurs  Abdomen: Soft, mildly distended  Extremities: No BLE edema  Skin: Normal turgor  Neurological: A&O x1-2 (self; "[facility]")    HOSPITAL MEDICATIONS:  MEDICATIONS  (STANDING):  sodium chloride 0.9% 1000 milliLiter(s) (100 mL/Hr) IV Continuous <Continuous>    MEDICATIONS  (PRN):      LABS:                        13.4   7.04  )-----------( 121      ( 25 Mar 2021 17:30 )             44.0     03-25    137  |  102  |  26<H>  ----------------------------<  344<H>  3.6   |  20<L>  |  0.97    Ca    8.2<L>      25 Mar 2021 21:50    TPro  7.0  /  Alb  3.8  /  TBili  0.4  /  DBili  x   /  AST  32  /  ALT  18  /  AlkPhos  69  03-25    PT/INR - ( 25 Mar 2021 17:53 )   PT: 13.0 sec;   INR: 1.09 ratio         PTT - ( 25 Mar 2021 17:53 )  PTT:28.8 sec      Venous Blood Gas:  03-25 @ 21:54  --/--/--/--/--  VBG Lactate: 1.4  Venous Blood Gas:  03-25 @ 17:53  7.41/37/21/23/32  VBG Lactate: 3.0  Beta Hydroxy-Butyrate (03.25.21 @ 21:50)   Beta Hydroxy-Butyrate: 1.8 mmol/L       Historical Values  Beta Hydroxy-Butyrate (03.25.21 @ 21:50)   Beta Hydroxy-Butyrate: 1.8 mmol/L   Beta Hydroxy-Butyrate (03.25.21 @ 17:30)   Beta Hydroxy-Butyrate: 3.1 mmol/L     MICROBIOLOGY:     RADIOLOGY:  EXAM:  CT ABDOMEN AND PELVIS IC                            PROCEDURE DATE:  03/25/2021      IMPRESSION:  Marked enlargement of pancreatic mass since 08/24/2020 consistent with carcinoma. Possible superimposed pancreatitis.  Left lower lobe opacity which may be infectious        [X] Reviewed and interpreted by me    EKG:  3/25/21 17:56:21  Vent rate 83 bpm   ms  QRS 82 ms  QTc 446 ms  Sinus rhythm with PVC CHIEF COMPLAINT:  DKA    HPI:  89-yo F w/ PMH of T2DM (A1C 12.6 Aug 2020), HTN, hypothyroidism, and pancreatic mass (2020), presenting for 1-week history of lethargy and constipation. Patient reports poor appetite over the past 1 week. Stated she had not eaten anything for a week, and no BM >1 week. Endorses constipation and abdominal discomfort. No fever, chills, thirst, nausea, vomiting, chest pain, dysuria, or coughs. No sick contacts. Lives with son. Patient is not on insulin. Of note, patient was found to have pancreatic tail mass last year on CT, however refused no further workup and declined surgical w/u.    In ED, Tmax 101.5, HR 72-99, /72 - 120/60, RR 19-20, sat 99% RA. COVID+. Initially AG 22 with bicarb on BMP 16. BHB 3.1. VBG 7.41/37/21/23. Glucose 415 on BMP. Given 2L NS, and started on 100 cc/hr NS 20mEq/L K. Improved to bicarb 20, BHB 1.8, AG 15, glucose 344. Lac 3.0 -> 1.4. CTAP w/ increased pancreatic mass since 8/24/20 w/ possible superimposed pancreatitis. LLL opacity noted.    PAST MEDICAL & SURGICAL HISTORY:  HTN (hypertension)    H/O: hysterectomy        FAMILY HISTORY:  No FHx    SOCIAL HISTORY:  Smoking: No tobacco history  EtOH Use: No EtOH use    Allergies    No Known Allergies    Intolerances        HOME MEDICATIONS:    REVIEW OF SYSTEMS:  Constitutional: NAD; weakness  Eyes: No blurry vision  ENT: No throat pain  CV: No chest pain  Resp: No SOB or wheezing  GI: +constipation  : No dysuria  MSK: No joint pain  Integumentary: No rash  Neurological: No headache or lightheadedness  [X] All other systems negative  [ ] Unable to assess ROS because ________    OBJECTIVE:  ICU Vital Signs Last 24 Hrs  T(C): 36.4 (25 Mar 2021 19:40), Max: 38.6 (25 Mar 2021 16:57)  T(F): 97.6 (25 Mar 2021 19:40), Max: 101.5 (25 Mar 2021 16:57)  HR: 72 (25 Mar 2021 19:40) (72 - 99)  BP: 120/60 (25 Mar 2021 19:40) (105/72 - 126/70)  BP(mean): --  ABP: --  ABP(mean): --  RR: 20 (25 Mar 2021 19:40) (18 - 20)  SpO2: 99% (25 Mar 2021 19:40) (97% - 99%)        CAPILLARY BLOOD GLUCOSE      POCT Blood Glucose.: 398 mg/dL (25 Mar 2021 15:53)      PHYSICAL EXAM:  General: NAD  HEENT: PERRLA, EOMI  Lymph Nodes: No anterior cervical LN  Neck: Supple  Respiratory:  CTA b/l; no wheezes; no coughs  Cardiovascular: +S1/S2. RRR; no murmurs  Abdomen: Soft, mildly distended  Extremities: No BLE edema  Skin: Normal turgor  Neurological: A&O x1-2 (self; "[facility]")    HOSPITAL MEDICATIONS:  MEDICATIONS  (STANDING):  sodium chloride 0.9% 1000 milliLiter(s) (100 mL/Hr) IV Continuous <Continuous>    MEDICATIONS  (PRN):      LABS:                        13.4   7.04  )-----------( 121      ( 25 Mar 2021 17:30 )             44.0     03-25    137  |  102  |  26<H>  ----------------------------<  344<H>  3.6   |  20<L>  |  0.97    Ca    8.2<L>      25 Mar 2021 21:50    TPro  7.0  /  Alb  3.8  /  TBili  0.4  /  DBili  x   /  AST  32  /  ALT  18  /  AlkPhos  69  03-25    PT/INR - ( 25 Mar 2021 17:53 )   PT: 13.0 sec;   INR: 1.09 ratio         PTT - ( 25 Mar 2021 17:53 )  PTT:28.8 sec      Venous Blood Gas:  03-25 @ 21:54  --/--/--/--/--  VBG Lactate: 1.4  Venous Blood Gas:  03-25 @ 17:53  7.41/37/21/23/32  VBG Lactate: 3.0  Beta Hydroxy-Butyrate (03.25.21 @ 21:50)   Beta Hydroxy-Butyrate: 1.8 mmol/L       Historical Values  Beta Hydroxy-Butyrate (03.25.21 @ 21:50)   Beta Hydroxy-Butyrate: 1.8 mmol/L   Beta Hydroxy-Butyrate (03.25.21 @ 17:30)   Beta Hydroxy-Butyrate: 3.1 mmol/L     MICROBIOLOGY:     RADIOLOGY:  EXAM:  CT ABDOMEN AND PELVIS IC                            PROCEDURE DATE:  03/25/2021      IMPRESSION:  Marked enlargement of pancreatic mass since 08/24/2020 consistent with carcinoma. Possible superimposed pancreatitis.  Left lower lobe opacity which may be infectious        [X] Reviewed and interpreted by me    EKG:  3/25/21 17:56:21  Vent rate 83 bpm   ms  QRS 82 ms  QTc 446 ms  Sinus rhythm with PVC

## 2021-03-25 NOTE — ED PROVIDER NOTE - ENMT, MLM
S/w pt, appt scheduled for 3/11/2021 at 11am with Dr DEGROOT.   Advised patient they need to keep their appointment to continue to receive refills in the future.  If they are unable to keep their appointment we will not be able to provider further refills.     DRY MM. Throat has no vesicles, no oropharyngeal exudates and uvula is midline.

## 2021-03-25 NOTE — ED PROVIDER NOTE - CLINICAL SUMMARY MEDICAL DECISION MAKING FREE TEXT BOX
Jaycee: Patient not eating/drinking x 1 week. not sleeping well. pancreatic mass found recently. recent diagnosis of DM. no abdominal ttp.  will get labs, ivf, ct a/p, beta hydroxbutyrate.  reassess

## 2021-03-25 NOTE — ED PROVIDER NOTE - RAPID ASSESSMENT
89y F with PMHx of HTN, presents to the ED c/o decreased sleeping and eating, and increased weakness x 1 week. Pt is AMS. Denies fever at home, cough, vomiting, and diarrhea. Pt is on doxycycline.     ILiz (Otto), have documented this rapid assessment note under the dictation of Larissa Cueva PA-C, which has been reviewed and affirmed to be accurate.  Patient was seen as a QPA patient. The patient will be seen and further worked up in the main emergency department and their care will be completed by the main emergency department team along with a thorough physical exam. Receiving team will follow up on labs, analgesia, any clinical imaging, reassess and disposition as clinically indicated, all decisions regarding the progression of care will be made at their discretion. 89y F with PMHx of HTN, recently dx with DM, presents to the ED c/o decreased sleeping and eating, and increased weakness x 1 week. Pt is AMS. Denies fever at home, vomiting, and diarrhea. Pt is on doxycycline recently prescribed by her PCP. Accompanied by son in triage.     ILiz (Scribgino), have documented this rapid assessment note under the dictation of Larissa Cueva PA-C, which has been reviewed and affirmed to be accurate.  Patient was seen as a QPA patient. The patient will be seen and further worked up in the main emergency department and their care will be completed by the main emergency department team along with a thorough physical exam. Receiving team will follow up on labs, analgesia, any clinical imaging, reassess and disposition as clinically indicated, all decisions regarding the progression of care will be made at their discretion.    ILarissa PA-C, personally performed the service described in the documentation recorded by the scribe in my presence, and it accurately and completely records my words and actions.

## 2021-03-25 NOTE — ED ADULT TRIAGE NOTE - CHIEF COMPLAINT QUOTE
ams x 1 week, not taking meds, not eating, weakness constipation  f/s=398, noot known to be diabetic    son Max [phone]

## 2021-03-25 NOTE — ED ADULT NURSE NOTE - OBJECTIVE STATEMENT
Patient  is  alert  and  oriented x4. Color is  good  and  skin warm  to touch.  She  is  c/o fever and  weakness.  No  cough noted. Patient is  c/o  generalized body pain.

## 2021-03-25 NOTE — ED PROVIDER NOTE - OBJECTIVE STATEMENT
89 year old female with pmhx of htn, ? recent diagnosis of dm, and pancaretic mass, here for decrease PO intake and generalized weakness x 1 week. Patient reports has not been sleeping well this past week or eating anything. denies abdominal pain,  no fever, no chills. no sob, no covid contacts. not altered, very hard of hearing.

## 2021-03-25 NOTE — ED PROVIDER NOTE - CARE PLAN
Principal Discharge DX:	Diabetic ketoacidosis without coma associated with type 2 diabetes mellitus

## 2021-03-25 NOTE — CONSULT NOTE ADULT - ASSESSMENT
89-yo F w/ PMH of T2DM (A1C 12.6 Aug 2020), HTN, hypothyroidism, and pancreatic mass (2020), presenting for 1-week history of lethargy and constipation, found to have mild DKA in the setting of poor PO intake and increased pancreatic mass compared to Aug 2020, also COVID+.    #DKA  - P/w mild DKA. Bicarb and BHB improved after 2L NS.  - Recommend weight-based basal-bolus insulin. Recommend 12u Lantus QHS and 4u Admelog QAC with ISS to start once patient can tolerate PO intake, may uptitrate.  - Endo consult AM  - Currently not deemed a candidate for MICU-level care. Please call back if clinical change warrants.

## 2021-03-26 DIAGNOSIS — Z51.5 ENCOUNTER FOR PALLIATIVE CARE: ICD-10-CM

## 2021-03-26 DIAGNOSIS — K59.00 CONSTIPATION, UNSPECIFIED: ICD-10-CM

## 2021-03-26 DIAGNOSIS — C25.9 MALIGNANT NEOPLASM OF PANCREAS, UNSPECIFIED: ICD-10-CM

## 2021-03-26 DIAGNOSIS — Z71.89 OTHER SPECIFIED COUNSELING: ICD-10-CM

## 2021-03-26 DIAGNOSIS — E03.9 HYPOTHYROIDISM, UNSPECIFIED: ICD-10-CM

## 2021-03-26 DIAGNOSIS — I10 ESSENTIAL (PRIMARY) HYPERTENSION: ICD-10-CM

## 2021-03-26 DIAGNOSIS — E11.10 TYPE 2 DIABETES MELLITUS WITH KETOACIDOSIS WITHOUT COMA: ICD-10-CM

## 2021-03-26 DIAGNOSIS — R06.4 HYPERVENTILATION: ICD-10-CM

## 2021-03-26 DIAGNOSIS — U07.1 COVID-19: ICD-10-CM

## 2021-03-26 LAB
A1C WITH ESTIMATED AVERAGE GLUCOSE RESULT: 13.1 % — HIGH (ref 4–5.6)
ANION GAP SERPL CALC-SCNC: 14 MMOL/L — SIGNIFICANT CHANGE UP (ref 5–17)
APPEARANCE UR: CLEAR — SIGNIFICANT CHANGE UP
BILIRUB UR-MCNC: NEGATIVE — SIGNIFICANT CHANGE UP
BUN SERPL-MCNC: 24 MG/DL — HIGH (ref 7–23)
CALCIUM SERPL-MCNC: 8.4 MG/DL — SIGNIFICANT CHANGE UP (ref 8.4–10.5)
CHLORIDE SERPL-SCNC: 107 MMOL/L — SIGNIFICANT CHANGE UP (ref 96–108)
CO2 SERPL-SCNC: 19 MMOL/L — LOW (ref 22–31)
COLOR SPEC: SIGNIFICANT CHANGE UP
COVID-19 SPIKE DOMAIN AB INTERP: NEGATIVE — SIGNIFICANT CHANGE UP
COVID-19 SPIKE DOMAIN ANTIBODY RESULT: 0.4 U/ML — SIGNIFICANT CHANGE UP
CREAT SERPL-MCNC: 0.79 MG/DL — SIGNIFICANT CHANGE UP (ref 0.5–1.3)
CRP SERPL-MCNC: 46 MG/L — HIGH (ref 0–4)
D DIMER BLD IA.RAPID-MCNC: 501 NG/ML DDU — HIGH
DIFF PNL FLD: NEGATIVE — SIGNIFICANT CHANGE UP
ESTIMATED AVERAGE GLUCOSE: 329 MG/DL — HIGH (ref 68–114)
FERRITIN SERPL-MCNC: 617 NG/ML — HIGH (ref 15–150)
GLUCOSE BLDC GLUCOMTR-MCNC: 112 MG/DL — HIGH (ref 70–99)
GLUCOSE BLDC GLUCOMTR-MCNC: 158 MG/DL — HIGH (ref 70–99)
GLUCOSE BLDC GLUCOMTR-MCNC: 209 MG/DL — HIGH (ref 70–99)
GLUCOSE BLDC GLUCOMTR-MCNC: 227 MG/DL — HIGH (ref 70–99)
GLUCOSE SERPL-MCNC: 234 MG/DL — HIGH (ref 70–99)
GLUCOSE UR QL: ABNORMAL
HCT VFR BLD CALC: 34.2 % — LOW (ref 34.5–45)
HGB BLD-MCNC: 11.2 G/DL — LOW (ref 11.5–15.5)
KETONES UR-MCNC: ABNORMAL
LEUKOCYTE ESTERASE UR-ACNC: NEGATIVE — SIGNIFICANT CHANGE UP
MCHC RBC-ENTMCNC: 31 PG — SIGNIFICANT CHANGE UP (ref 27–34)
MCHC RBC-ENTMCNC: 32.7 GM/DL — SIGNIFICANT CHANGE UP (ref 32–36)
MCV RBC AUTO: 94.7 FL — SIGNIFICANT CHANGE UP (ref 80–100)
NITRITE UR-MCNC: NEGATIVE — SIGNIFICANT CHANGE UP
NRBC # BLD: 0 /100 WBCS — SIGNIFICANT CHANGE UP (ref 0–0)
PH UR: 6.5 — SIGNIFICANT CHANGE UP (ref 5–8)
PLATELET # BLD AUTO: 119 K/UL — LOW (ref 150–400)
POTASSIUM SERPL-MCNC: 3.4 MMOL/L — LOW (ref 3.5–5.3)
POTASSIUM SERPL-SCNC: 3.4 MMOL/L — LOW (ref 3.5–5.3)
PROCALCITONIN SERPL-MCNC: 0.27 NG/ML — HIGH (ref 0.02–0.1)
PROT UR-MCNC: ABNORMAL
RBC # BLD: 3.61 M/UL — LOW (ref 3.8–5.2)
RBC # FLD: 13.3 % — SIGNIFICANT CHANGE UP (ref 10.3–14.5)
SARS-COV-2 IGG+IGM SERPL QL IA: 0.4 U/ML — SIGNIFICANT CHANGE UP
SARS-COV-2 IGG+IGM SERPL QL IA: NEGATIVE — SIGNIFICANT CHANGE UP
SODIUM SERPL-SCNC: 140 MMOL/L — SIGNIFICANT CHANGE UP (ref 135–145)
SP GR SPEC: >1.05 (ref 1.01–1.02)
TSH SERPL-MCNC: 0.84 UIU/ML — SIGNIFICANT CHANGE UP (ref 0.27–4.2)
UROBILINOGEN FLD QL: NEGATIVE — SIGNIFICANT CHANGE UP
WBC # BLD: 5.1 K/UL — SIGNIFICANT CHANGE UP (ref 3.8–10.5)
WBC # FLD AUTO: 5.1 K/UL — SIGNIFICANT CHANGE UP (ref 3.8–10.5)

## 2021-03-26 PROCEDURE — 99497 ADVNCD CARE PLAN 30 MIN: CPT | Mod: 25

## 2021-03-26 PROCEDURE — 99223 1ST HOSP IP/OBS HIGH 75: CPT

## 2021-03-26 PROCEDURE — 12345: CPT | Mod: NC

## 2021-03-26 RX ORDER — MULTIVIT-MIN/FERROUS GLUCONATE 9 MG/15 ML
1 LIQUID (ML) ORAL DAILY
Refills: 0 | Status: DISCONTINUED | OUTPATIENT
Start: 2021-03-26 | End: 2021-03-31

## 2021-03-26 RX ORDER — LOSARTAN POTASSIUM 100 MG/1
50 TABLET, FILM COATED ORAL DAILY
Refills: 0 | Status: DISCONTINUED | OUTPATIENT
Start: 2021-03-26 | End: 2021-03-31

## 2021-03-26 RX ORDER — ROBINUL 0.2 MG/ML
0.4 INJECTION INTRAMUSCULAR; INTRAVENOUS EVERY 4 HOURS
Refills: 0 | Status: DISCONTINUED | OUTPATIENT
Start: 2021-03-26 | End: 2021-03-31

## 2021-03-26 RX ORDER — ACETAMINOPHEN 500 MG
650 TABLET ORAL EVERY 6 HOURS
Refills: 0 | Status: DISCONTINUED | OUTPATIENT
Start: 2021-03-26 | End: 2021-03-31

## 2021-03-26 RX ORDER — DEXTROSE 50 % IN WATER 50 %
15 SYRINGE (ML) INTRAVENOUS ONCE
Refills: 0 | Status: DISCONTINUED | OUTPATIENT
Start: 2021-03-26 | End: 2021-03-31

## 2021-03-26 RX ORDER — SODIUM CHLORIDE 9 MG/ML
1000 INJECTION, SOLUTION INTRAVENOUS
Refills: 0 | Status: DISCONTINUED | OUTPATIENT
Start: 2021-03-26 | End: 2021-03-31

## 2021-03-26 RX ORDER — SENNOSIDES/DOCUSATE SODIUM 8.6MG-50MG
1 TABLET ORAL
Qty: 0 | Refills: 0 | DISCHARGE

## 2021-03-26 RX ORDER — PREGABALIN 225 MG/1
1 CAPSULE ORAL
Qty: 0 | Refills: 0 | DISCHARGE

## 2021-03-26 RX ORDER — INSULIN LISPRO 100/ML
4 VIAL (ML) SUBCUTANEOUS
Refills: 0 | Status: DISCONTINUED | OUTPATIENT
Start: 2021-03-26 | End: 2021-03-26

## 2021-03-26 RX ORDER — POTASSIUM CHLORIDE 20 MEQ
40 PACKET (EA) ORAL ONCE
Refills: 0 | Status: COMPLETED | OUTPATIENT
Start: 2021-03-26 | End: 2021-03-26

## 2021-03-26 RX ORDER — INSULIN LISPRO 100/ML
18 VIAL (ML) SUBCUTANEOUS ONCE
Refills: 0 | Status: DISCONTINUED | OUTPATIENT
Start: 2021-03-26 | End: 2021-03-26

## 2021-03-26 RX ORDER — DEXTROSE 50 % IN WATER 50 %
25 SYRINGE (ML) INTRAVENOUS ONCE
Refills: 0 | Status: DISCONTINUED | OUTPATIENT
Start: 2021-03-26 | End: 2021-03-31

## 2021-03-26 RX ORDER — MORPHINE SULFATE 50 MG/1
0.5 CAPSULE, EXTENDED RELEASE ORAL
Refills: 0 | Status: DISCONTINUED | OUTPATIENT
Start: 2021-03-26 | End: 2021-03-30

## 2021-03-26 RX ORDER — POLYETHYLENE GLYCOL 3350 17 G/17G
17 POWDER, FOR SOLUTION ORAL
Refills: 0 | Status: DISCONTINUED | OUTPATIENT
Start: 2021-03-26 | End: 2021-03-31

## 2021-03-26 RX ORDER — INSULIN LISPRO 100/ML
VIAL (ML) SUBCUTANEOUS
Refills: 0 | Status: DISCONTINUED | OUTPATIENT
Start: 2021-03-26 | End: 2021-03-31

## 2021-03-26 RX ORDER — INSULIN LISPRO 100/ML
VIAL (ML) SUBCUTANEOUS AT BEDTIME
Refills: 0 | Status: DISCONTINUED | OUTPATIENT
Start: 2021-03-26 | End: 2021-03-31

## 2021-03-26 RX ORDER — ERGOCALCIFEROL 1.25 MG/1
1 CAPSULE ORAL
Qty: 0 | Refills: 0 | DISCHARGE

## 2021-03-26 RX ORDER — DEXTROSE 50 % IN WATER 50 %
12.5 SYRINGE (ML) INTRAVENOUS ONCE
Refills: 0 | Status: DISCONTINUED | OUTPATIENT
Start: 2021-03-26 | End: 2021-03-31

## 2021-03-26 RX ORDER — SENNA PLUS 8.6 MG/1
2 TABLET ORAL AT BEDTIME
Refills: 0 | Status: DISCONTINUED | OUTPATIENT
Start: 2021-03-26 | End: 2021-03-31

## 2021-03-26 RX ORDER — LEVOTHYROXINE SODIUM 125 MCG
50 TABLET ORAL DAILY
Refills: 0 | Status: DISCONTINUED | OUTPATIENT
Start: 2021-03-26 | End: 2021-03-31

## 2021-03-26 RX ORDER — INSULIN LISPRO 100/ML
6 VIAL (ML) SUBCUTANEOUS ONCE
Refills: 0 | Status: COMPLETED | OUTPATIENT
Start: 2021-03-26 | End: 2021-03-26

## 2021-03-26 RX ORDER — METFORMIN HYDROCHLORIDE 850 MG/1
1 TABLET ORAL
Qty: 0 | Refills: 0 | DISCHARGE

## 2021-03-26 RX ORDER — INFLUENZA VIRUS VACCINE 15; 15; 15; 15 UG/.5ML; UG/.5ML; UG/.5ML; UG/.5ML
0.5 SUSPENSION INTRAMUSCULAR ONCE
Refills: 0 | Status: COMPLETED | OUTPATIENT
Start: 2021-03-26 | End: 2021-03-26

## 2021-03-26 RX ORDER — GLUCAGON INJECTION, SOLUTION 0.5 MG/.1ML
1 INJECTION, SOLUTION SUBCUTANEOUS ONCE
Refills: 0 | Status: DISCONTINUED | OUTPATIENT
Start: 2021-03-26 | End: 2021-03-31

## 2021-03-26 RX ORDER — ENOXAPARIN SODIUM 100 MG/ML
40 INJECTION SUBCUTANEOUS DAILY
Refills: 0 | Status: DISCONTINUED | OUTPATIENT
Start: 2021-03-26 | End: 2021-03-31

## 2021-03-26 RX ORDER — INSULIN GLARGINE 100 [IU]/ML
10 INJECTION, SOLUTION SUBCUTANEOUS EVERY MORNING
Refills: 0 | Status: DISCONTINUED | OUTPATIENT
Start: 2021-03-26 | End: 2021-03-30

## 2021-03-26 RX ORDER — INSULIN GLARGINE 100 [IU]/ML
12 INJECTION, SOLUTION SUBCUTANEOUS EVERY MORNING
Refills: 0 | Status: DISCONTINUED | OUTPATIENT
Start: 2021-03-26 | End: 2021-03-26

## 2021-03-26 RX ORDER — INSULIN LISPRO 100/ML
3 VIAL (ML) SUBCUTANEOUS
Refills: 0 | Status: DISCONTINUED | OUTPATIENT
Start: 2021-03-26 | End: 2021-03-31

## 2021-03-26 RX ADMIN — SENNA PLUS 2 TABLET(S): 8.6 TABLET ORAL at 21:55

## 2021-03-26 RX ADMIN — Medication 50 MICROGRAM(S): at 05:58

## 2021-03-26 RX ADMIN — SODIUM CHLORIDE 100 MILLILITER(S): 9 INJECTION, SOLUTION INTRAVENOUS at 05:58

## 2021-03-26 RX ADMIN — Medication 2: at 13:54

## 2021-03-26 RX ADMIN — INSULIN GLARGINE 10 UNIT(S): 100 INJECTION, SOLUTION SUBCUTANEOUS at 08:11

## 2021-03-26 RX ADMIN — ENOXAPARIN SODIUM 40 MILLIGRAM(S): 100 INJECTION SUBCUTANEOUS at 13:20

## 2021-03-26 RX ADMIN — POLYETHYLENE GLYCOL 3350 17 GRAM(S): 17 POWDER, FOR SOLUTION ORAL at 18:02

## 2021-03-26 RX ADMIN — Medication 3 UNIT(S): at 13:54

## 2021-03-26 RX ADMIN — Medication 1: at 18:45

## 2021-03-26 RX ADMIN — Medication 2: at 08:04

## 2021-03-26 RX ADMIN — POLYETHYLENE GLYCOL 3350 17 GRAM(S): 17 POWDER, FOR SOLUTION ORAL at 05:58

## 2021-03-26 RX ADMIN — Medication 40 MILLIEQUIVALENT(S): at 07:59

## 2021-03-26 RX ADMIN — Medication 3 UNIT(S): at 18:46

## 2021-03-26 RX ADMIN — Medication 5 MILLIGRAM(S): at 05:58

## 2021-03-26 RX ADMIN — MORPHINE SULFATE 0.5 MILLIGRAM(S): 50 CAPSULE, EXTENDED RELEASE ORAL at 13:19

## 2021-03-26 RX ADMIN — LOSARTAN POTASSIUM 50 MILLIGRAM(S): 100 TABLET, FILM COATED ORAL at 05:58

## 2021-03-26 RX ADMIN — Medication 1 TABLET(S): at 13:20

## 2021-03-26 RX ADMIN — MORPHINE SULFATE 0.5 MILLIGRAM(S): 50 CAPSULE, EXTENDED RELEASE ORAL at 13:49

## 2021-03-26 RX ADMIN — Medication 6 UNIT(S): at 00:51

## 2021-03-26 NOTE — H&P ADULT - NSHPPHYSICALEXAM_GEN_ALL_CORE
T(C): 36.8 (03-26-21 @ 02:22), Max: 38.6 (03-25-21 @ 16:57)  HR: 72 (03-26-21 @ 02:22) (62 - 99)  BP: 137/61 (03-26-21 @ 02:22) (105/72 - 137/61)  RR: 18 (03-26-21 @ 02:22) (18 - 20)  SpO2: 97% (03-26-21 @ 02:22) (97% - 99%)    Gen: female in NAD, appears comfortable, no diaphoresis  HEENT: NCAT, MMM, neck soft and supple  CV: +S1/S2  Resp: speaking in full sentences, no respiratory accessory muscle use  GI: normoactive BS, soft, NTND, no rebounding/guarding  Ext: No LE edema, extremities appear warm and well perfused   Neuro: no focal deficits, CNII-XII grossly intact  Psych: No SI/HI/AVH, appropriate affect  Skin: no petechiae, ecchymosis or maculopapular rash noted

## 2021-03-26 NOTE — H&P ADULT - ASSESSMENT
89F with PMHx of pancreatic adenocarcinoma (diagnosed Aug. 2020, but opting for comfort measures), HTN, T2DM, and hypothyroidism presents to [facility] with one week history of weakness, decrease PO intake, weight loss and constipation. Patient found to be hyperglycemic with ketones and anion gap which have resolved. CT A&P showing enlarged known pancreatic mass that is adenocarcinoma. She was incidentally found to be COVID-19 positive. Symptoms likely secondary to hyperglycemia, progression of pancreatic cancer, and possibly non-specific COVID symptoms.

## 2021-03-26 NOTE — H&P ADULT - PROBLEM SELECTOR PLAN 2
She seems asymptomatic, though the above symptoms could be from COVID. She is not hypoxic.    -Send inflammatory markers  -Defer Remdesivir & Dexamethasone  -DVT PPx

## 2021-03-26 NOTE — CONSULT NOTE ADULT - CONVERSATION DETAILS
The patient did not want to participate in decisions making. Furthermore, she was slightly confused. I then called the patient's son that gave verbalized understanding about the patient's advanced illness (Metastatic Pancreatic CA with progression of disease) c/b COVID-19 and hyperglycemia/Uncontrolled DM 2, and poor prognosis. Max indicated that at this point goals should be toward preventing and treating any distressful symptoms and trying to bring the patient to an environment where her symptoms can be monitor and treated. He still wants IV, Abx if needed, and considering Dexa and or Remedesiver (if thought to be helpful with her symptoms) and O2 if needed. He also agree with DNR/I. He agree with a hospice referral and considering inpatient hospice in order to further address symptoms as a bridge for home hospice, if possible.

## 2021-03-26 NOTE — CONSULT NOTE ADULT - SUBJECTIVE AND OBJECTIVE BOX
HPI:  89F with PMHx of pancreatic adenocarcinoma (diagnosed Aug. 2020, but opting for comfort measures), HTN, T2DM, and hypothyroidism presents to [facility] with one week history of weakness, decrease PO intake, weight loss and constipation. Patient last admitted on Aug. 2020 when she was noted to have pancreatic mass and eventually found to have adenocarcinoma. [facility] discussion with son at that time revealed DNR/DNI and home hospice given her functional status was good and treatment would likely involve chemotherapy and/or whipple.     When seen in ED patient reiterated those complaints. Initially, found to have glucose of 400, AG of 22 and BHB. She was given 2 L NS and 6 units lispro and now AG is closed. She was incidentally found to be COVID-19 positive. She denies cough or shortness of breath. MICU saw patient and deemed not a candidate. Medication reconciliation reviews patient is only on Glipizide 4 mg daily.   (26 Mar 2021 04:52)    PERTINENT PM/SXH:   Pancreatic adenocarcinoma    Hypothyroidism    HTN (hypertension)      H/O: hysterectomy      FAMILY HISTORY:  No pertinent family history in first degree relatives      ITEMS NOT CHECKED ARE NOT PRESENT    SOCIAL HISTORY:   Significant other/partner[ ]  Children[ ]  Bahai/Spirituality:  Substance hx:  [ ]   Tobacco hx:  [ ]   Alcohol hx: [ ]   Home Opioid hx:  [ ] I-Stop Reference No:  Living Situation: [ ]Home  [ ]Long term care  [ ]Rehab [ ]Other    ADVANCE DIRECTIVES:    DNR  MOLST  [ ]  Living Will  [ ]   DECISION MAKER(s):  [ ] Health Care Proxy(s)  [ ] Surrogate(s)  [ ] Guardian           Name(s): Phone Number(s):    BASELINE (I)ADL(s) (prior to admission):  [place]: [ ]Total  [ ] Moderate [ ]Dependent    Allergies    No Known Allergies    Intolerances    MEDICATIONS  (STANDING):  dextrose 40% Gel 15 Gram(s) Oral once  dextrose 5%. 1000 milliLiter(s) (50 mL/Hr) IV Continuous <Continuous>  dextrose 5%. 1000 milliLiter(s) (100 mL/Hr) IV Continuous <Continuous>  dextrose 50% Injectable 25 Gram(s) IV Push once  dextrose 50% Injectable 12.5 Gram(s) IV Push once  dextrose 50% Injectable 25 Gram(s) IV Push once  enoxaparin Injectable 40 milliGRAM(s) SubCutaneous daily  glucagon  Injectable 1 milliGRAM(s) IntraMuscular once  influenza   Vaccine 0.5 milliLiter(s) IntraMuscular once  insulin glargine Injectable (LANTUS) 10 Unit(s) SubCutaneous every morning  insulin lispro (ADMELOG) corrective regimen sliding scale   SubCutaneous three times a day before meals  insulin lispro (ADMELOG) corrective regimen sliding scale   SubCutaneous at bedtime  insulin lispro Injectable (ADMELOG) 3 Unit(s) SubCutaneous three times a day before meals  lactated ringers. 1000 milliLiter(s) (100 mL/Hr) IV Continuous <Continuous>  levothyroxine 50 MICROGram(s) Oral daily  losartan 50 milliGRAM(s) Oral daily  polyethylene glycol 3350 17 Gram(s) Oral two times a day  senna 2 Tablet(s) Oral at bedtime    MEDICATIONS  (PRN):  acetaminophen   Tablet .. 650 milliGRAM(s) Oral every 6 hours PRN Temp greater or equal to 38C (100.4F), Mild Pain (1 - 3), Moderate Pain (4 - 6)    PRESENT SYMPTOMS: [ ]Unable to obtain due to poor mentation   Source if other than patient:  [ ]Family   [ ]Team     Pain: [ ]yes [ ]no  QOL impact -   Location -                    Aggravating factors -  Quality -  Radiation -  Timing-  Severity (0-10 scale):  Minimal acceptable level (0-10 scale):     CPOT:    https://www.[facility].org/getattachment/via64y25-3n9t-0f0j-9s5j-1969x7569u0s/Critical-Care-Pain-Observation-Tool-(CPOT)      PAIN AD Score:     [email or web address]/painad.pdf (press ctrl +  left click to view)    Dyspnea:                           [ ]Mild [ ]Moderate [ ]Severe  Anxiety:                             [ ]Mild [ ]Moderate [ ]Severe  Fatigue:                             [ ]Mild [ ]Moderate [ ]Severe  Nausea:                             [ ]Mild [ ]Moderate [ ]Severe  Loss of appetite:              [ ]Mild [ ]Moderate [ ]Severe  Constipation:                    [ ]Mild [ ]Moderate [ ]Severe    Other Symptoms:  [ ]All other review of systems negative     Palliative Performance Status Version 2:         %    http://npcrc.org/files/news/palliative_performance_scale_ppsv2.pdf  PHYSICAL EXAM:  Vital Signs Last 24 Hrs  T(C): 36.9 (26 Mar 2021 05:25), Max: 38.6 (25 Mar 2021 16:57)  T(F): 98.5 (26 Mar 2021 05:25), Max: 101.5 (25 Mar 2021 16:57)  HR: 64 (26 Mar 2021 05:25) (62 - 99)  BP: 144/64 (26 Mar 2021 05:25) (105/72 - 144/64)  BP(mean): --  RR: 18 (26 Mar 2021 05:25) (18 - 20)  SpO2: 97% (26 Mar 2021 05:25) (97% - 99%) I&O's Summary    GENERAL:  [ ]Alert  [ ]Oriented x   [ ]Lethargic  [ ]Cachexia  [ ]Unarousable  [ ]Verbal  [ ]Non-Verbal  Behavioral:   [ ] Anxiety  [ ] Delirium [ ] Agitation [ ] Other  HEENT:  [ ]Normal   [ ]Dry mouth   [ ]ET Tube/Trach  [ ]Oral lesions  PULMONARY:   [ ]Clear [ ]Tachypnea  [ ]Audible excessive secretions   [ ]Rhonchi        [ ]Right [ ]Left [ ]Bilateral  [ ]Crackles        [ ]Right [ ]Left [ ]Bilateral  [ ]Wheezing     [ ]Right [ ]Left [ ]Bilateral  [ ]Diminished breath sounds [ ]right [ ]left [ ]bilateral  CARDIOVASCULAR:    [ ]Regular [ ]Irregular [ ]Tachy  [ ]Raghu [ ]Murmur [ ]Other  GASTROINTESTINAL:  [ ]Soft  [ ]Distended   [ ]+BS  [ ]Non tender [ ]Tender  [ ]PEG [ ]OGT/ NGT  Last BM:   GENITOURINARY:  [ ]Normal [ ] Incontinent   [ ]Oliguria/Anuria   [ ]Hui  MUSCULOSKELETAL:   [ ]Normal   [ ]Weakness  [ ]Bed/Wheelchair bound [ ]Edema  NEUROLOGIC:   [ ]No focal deficits  [ ]Cognitive impairment  [ ]Dysphagia [ ]Dysarthria [ ]Paresis [ ]Other   SKIN:   [ ]Normal    [ ]Rash  [ ]Pressure ulcer(s)       Present on admission [ ]y [ ]n    CRITICAL CARE:  [ ] Shock Present  [ ]Septic [ ]Cardiogenic [ ]Neurologic [ ]Hypovolemic  [ ]  Vasopressors [ ]  Inotropes   [ ]Respiratory failure present [ ]Mechanical ventilation [ ]Non-invasive ventilatory support [ ]High flow    [ ]Acute  [ ]Chronic [ ]Hypoxic  [ ]Hypercarbic [ ]Other  [ ]Other organ failure     LABS:                        11.2   5.10  )-----------( 119      ( 26 Mar 2021 05:51 )             34.2   03-26    140  |  107  |  24<H>  ----------------------------<  234<H>  3.4<L>   |  19<L>  |  0.79    Ca    8.4      26 Mar 2021 05:51    TPro  7.0  /  Alb  3.8  /  TBili  0.4  /  DBili  x   /  AST  32  /  ALT  18  /  AlkPhos  69  03-25  PT/INR - ( 25 Mar 2021 17:53 )   PT: 13.0 sec;   INR: 1.09 ratio         PTT - ( 25 Mar 2021 17:53 )  PTT:28.8 sec    Urinalysis Basic - ( 26 Mar 2021 06:35 )    Color: Light Yellow / Appearance: Clear / SG: >1.050 / pH: x  Gluc: x / Ketone: Small  / Bili: Negative / Urobili: Negative   Blood: x / Protein: 30 mg/dL / Nitrite: Negative   Leuk Esterase: Negative / RBC: 2 /hpf / WBC 3 /HPF   Sq Epi: x / Non Sq Epi: 1 /hpf / Bacteria: Negative      RADIOLOGY & ADDITIONAL STUDIES:  c< from: CT Abdomen and Pelvis w/ IV Cont (03.25.21 @ 19:14) >  EXAM:  CT ABDOMEN AND PELVIS IC                            PROCEDURE DATE:  03/25/2021  IMPRESSION:  Marked enlargement of pancreatic mass since 08/24/2020 consistent with carcinoma. Possible superimposed pancreatitis.  Left lower lobe opacity which may be infectious                  ANGELICA QUINTANILLA MD; Attending Radiologist  This document has been electronically signed. Mar 25 2021  7:29PMPANCREAS: Marked enlargement of previously demonstrated mass of the pancreatic body now measuring 4.9 x 3.1 cm with downstream pancreatic atrophy and dilatation of the pancreatic duct. Adjacent fat stranding which may represent superimposed pancreatitis. The mass abuts the lesser curvature of the stomach.    < end of copied text >    < from: Xray Chest 1 View- PORTABLE-Urgent (03.25.21 @ 17:55) >    FINDINGS:    Right glenohumeral joint arthrosis. The cardiomediastinal silhouette is unremarkable. The lungs are clear. There is no pleural effusion or pneumothorax.    IMPRESSION:    Clear lungs.  EXAM:  XR CHEST PORTABLE URGENT 1V                            PROCEDURE DATE:  03/25/2021      < end of copied text >  PROTEIN CALORIE MALNUTRITION PRESENT: [ ]mild [ ]moderate [ ]severe [ ]underweight [ ]morbid obesity  https://www.andeal.org/vault/2440/web/files/ONC/Table_Clinical%20Characteristics%20to%20Document%20Malnutrition-White%20JV%20et%20al%202012.pdf    Height (cm): 154.9 (03-25-21 @ 15:47), 154.94 (08-27-20 @ 12:56)  Weight (kg): 63.5 (03-25-21 @ 15:47), 65.5 (08-27-20 @ 12:56)  BMI (kg/m2): 26.5 (03-25-21 @ 15:47), 27.3 (08-27-20 @ 12:56)    [ ]PPSV2 < or = to 30% [ ]significant weight loss  [ ]poor nutritional intake  [ ]anasarca     Albumin, Serum: 3.8 g/dL (03-25-21 @ 17:30)   [ ]Artificial Nutrition      REFERRALS:   [ ]Chaplaincy  [ ]Hospice  [ ]Child Life  [ ]Social Work  [ ]Case management [ ]Holistic Therapy     Goals of Care Document:  Siena Werner ID ## [id number] Jovanni   HPI:  89F with PMHx of pancreatic adenocarcinoma (diagnosed Aug. 2020, but opting for comfort measures), HTN, T2DM, and hypothyroidism presents to [facility] with one week history of weakness, decrease PO intake, weight loss and constipation. Patient last admitted on Aug. 2020 when she was noted to have pancreatic mass and eventually found to have adenocarcinoma. C discussion with son at that time revealed DNR/DNI and home hospice given her functional status was good and treatment would likely involve chemotherapy and/or whipple.     When seen in ED patient reiterated those complaints. Initially, found to have glucose of 400, AG of 22 and BHB. She was given 2 L NS and 6 units lispro and now AG is closed. She was incidentally found to be COVID-19 positive. She denies cough or shortness of breath. MICU saw patient and deemed not a candidate. Medication reconciliation reviews patient is only on Glipizide 4 mg daily.   (26 Mar 2021 04:52)    3/26 The patient was seen and examined. She was having labored breathing and c/o dyspnea that she believed had slightly improved. She denied pain. She was slightly confused. She was also c/o anorexia  and severe debility.     PERTINENT PM/SXH:   Pancreatic adenocarcinoma    Hypothyroidism    HTN (hypertension)      H/O: hysterectomy      FAMILY HISTORY:  No pertinent family history in first degree relatives      ITEMS NOT CHECKED ARE NOT PRESENT    SOCIAL HISTORY:   Significant other/partner[ ]  Children[ ]  Moravian/Spirituality:  Substance hx:  [ ]   Tobacco hx:  [ ]   Alcohol hx: [ ]   Home Opioid hx:  [ ] I-Stop Reference No:  Living Situation: [ ]Home  [ ]Long term care  [ ]Rehab [ ]Other    ADVANCE DIRECTIVES:    DNR  MOLST  [ ]  Living Will  [ ]   DECISION MAKER(s):  [x ] Health Care Proxy(s)  [ ] Surrogate(s)  [ ] Guardian           Name(s): Phone Number(s): Anthony Arndt [phone]    BASELINE (I)ADL(s) (prior to admission):  [place]: [ ]Total  [ ] Moderate [x ]Dependent    Allergies    No Known Allergies    Intolerances    MEDICATIONS  (STANDING):  dextrose 40% Gel 15 Gram(s) Oral once  dextrose 5%. 1000 milliLiter(s) (50 mL/Hr) IV Continuous <Continuous>  dextrose 5%. 1000 milliLiter(s) (100 mL/Hr) IV Continuous <Continuous>  dextrose 50% Injectable 25 Gram(s) IV Push once  dextrose 50% Injectable 12.5 Gram(s) IV Push once  dextrose 50% Injectable 25 Gram(s) IV Push once  enoxaparin Injectable 40 milliGRAM(s) SubCutaneous daily  glucagon  Injectable 1 milliGRAM(s) IntraMuscular once  influenza   Vaccine 0.5 milliLiter(s) IntraMuscular once  insulin glargine Injectable (LANTUS) 10 Unit(s) SubCutaneous every morning  insulin lispro (ADMELOG) corrective regimen sliding scale   SubCutaneous three times a day before meals  insulin lispro (ADMELOG) corrective regimen sliding scale   SubCutaneous at bedtime  insulin lispro Injectable (ADMELOG) 3 Unit(s) SubCutaneous three times a day before meals  lactated ringers. 1000 milliLiter(s) (100 mL/Hr) IV Continuous <Continuous>  levothyroxine 50 MICROGram(s) Oral daily  losartan 50 milliGRAM(s) Oral daily  polyethylene glycol 3350 17 Gram(s) Oral two times a day  senna 2 Tablet(s) Oral at bedtime    MEDICATIONS  (PRN):  acetaminophen   Tablet .. 650 milliGRAM(s) Oral every 6 hours PRN Temp greater or equal to 38C (100.4F), Mild Pain (1 - 3), Moderate Pain (4 - 6)    PRESENT SYMPTOMS: [ ]Unable to obtain due to poor mentation   Source if other than patient:  [ ]Family   [ ]Team     Pain: [ ]yes [ x]no  QOL impact -   Location -                    Aggravating factors -  Quality -  Radiation -  Timing-  Severity (0-10 scale):  Minimal acceptable level (0-10 scale):     CPOT:    https://www.sccm.org/getattachment/ugc57j33-9c7y-2x3p-9f4y-7820x1256d0q/Critical-Care-Pain-Observation-Tool-(CPOT)      PAIN AD Score:     [email or web address]/painad.pdf (press ctrl +  left click to view)    Dyspnea:                           [x ]Mild [ ]Moderate [ ]Severe  Anxiety:                             [ ]Mild [ ]Moderate [ ]Severe  Fatigue:                             [ ]Mild [ ]Moderate [ ]Severe  Nausea:                             [ ]Mild [ ]Moderate [ ]Severe  Loss of appetite:              [ ]Mild [ ]Moderate [ ]Severe  Constipation:                    [ ]Mild [ ]Moderate [ ]Severe    Other Symptoms:  [x ]All other review of systems negative     Palliative Performance Status Version 2:   30      %    http://npcrc.org/files/news/palliative_performance_scale_ppsv2.pdf  PHYSICAL EXAM:  Vital Signs Last 24 Hrs  T(C): 36.9 (26 Mar 2021 05:25), Max: 38.6 (25 Mar 2021 16:57)  T(F): 98.5 (26 Mar 2021 05:25), Max: 101.5 (25 Mar 2021 16:57)  HR: 64 (26 Mar 2021 05:25) (62 - 99)  BP: 144/64 (26 Mar 2021 05:25) (105/72 - 144/64)  BP(mean): --  RR: 18 (26 Mar 2021 05:25) (18 - 20)  SpO2: 97% (26 Mar 2021 05:25) (97% - 99%) I&O's Summary    GENERAL:  [x ]Alert  [x ]Oriented x 3  [ ]Lethargic  [ ]Cachexia  [ ]Unarousable  [x ]Verbal  [ ]Non-Verbal  Behavioral:   [ ] Anxiety  [ ] Delirium [ ] Agitation [x ] Other: Mild confusion   HEENT:  [x ]Normal   [ ]Dry mouth   [ ]ET Tube/Trach  [ ]Oral lesions  PULMONARY:   [ ]Clear [ ]Tachypnea  [ ]Audible excessive secretions   [ ]Rhonchi        [ ]Right [ ]Left [ ]Bilateral  [x ]Crackles        [ ]Right [x ]Left [ ]Bilateral  [ ]Wheezing     [ ]Right [ ]Left [ ]Bilateral  [x ]Diminished breath sounds [ ]right [ ]left [x ]bilateral    CARDIOVASCULAR:    [x ]Regular [ ]Irregular [ ]Tachy  [ ]Raghu [ ]Murmur [ ]Other  GASTROINTESTINAL:  [ ]Soft  [x ]Distended   [ ]+BS  [x ]Non tender [ ]Tender  [ ]PEG [ ]OGT/ NGT  Last BM:   GENITOURINARY:  x[ ]Normal [ ] Incontinent   [ ]Oliguria/Anuria   [ ]Hui  MUSCULOSKELETAL:   [ ]Normal   [x ]Weakness  [ ]Bed/Wheelchair bound [ ]Edema  NEUROLOGIC:   [ ]No focal deficits  [ ]Cognitive impairment  [ ]Dysphagia [ ]Dysarthria [ ]Paresis [x ]Other: Mild confusion (e.g., was thinking that her son was her brother)    SKIN:   [x ]Normal    [ ]Rash  [ ]Pressure ulcer(s)       Present on admission [ ]y [ ]n    CRITICAL CARE:  [ ] Shock Present  [ ]Septic [ ]Cardiogenic [ ]Neurologic [ ]Hypovolemic  [ ]  Vasopressors [ ]  Inotropes   [ ]Respiratory failure present [ ]Mechanical ventilation [ ]Non-invasive ventilatory support [ ]High flow    [ ]Acute  [ ]Chronic [ ]Hypoxic  [ ]Hypercarbic [ ]Other  [ ]Other organ failure     LABS:                        11.2   5.10  )-----------( 119      ( 26 Mar 2021 05:51 )             34.2   03-26    140  |  107  |  24<H>  ----------------------------<  234<H>  3.4<L>   |  19<L>  |  0.79    Ca    8.4      26 Mar 2021 05:51    TPro  7.0  /  Alb  3.8  /  TBili  0.4  /  DBili  x   /  AST  32  /  ALT  18  /  AlkPhos  69  03-25  PT/INR - ( 25 Mar 2021 17:53 )   PT: 13.0 sec;   INR: 1.09 ratio         PTT - ( 25 Mar 2021 17:53 )  PTT:28.8 sec    Urinalysis Basic - ( 26 Mar 2021 06:35 )    Color: Light Yellow / Appearance: Clear / SG: >1.050 / pH: x  Gluc: x / Ketone: Small  / Bili: Negative / Urobili: Negative   Blood: x / Protein: 30 mg/dL / Nitrite: Negative   Leuk Esterase: Negative / RBC: 2 /hpf / WBC 3 /HPF   Sq Epi: x / Non Sq Epi: 1 /hpf / Bacteria: Negative      RADIOLOGY & ADDITIONAL STUDIES:  c< from: CT Abdomen and Pelvis w/ IV Cont (03.25.21 @ 19:14) >  EXAM:  CT ABDOMEN AND PELVIS IC                            PROCEDURE DATE:  03/25/2021  IMPRESSION:  Marked enlargement of pancreatic mass since 08/24/2020 consistent with carcinoma. Possible superimposed pancreatitis.  Left lower lobe opacity which may be infectious                  ANGELICA QUINTANILLA MD; Attending Radiologist  This document has been electronically signed. Mar 25 2021  7:29PMPANCREAS: Marked enlargement of previously demonstrated mass of the pancreatic body now measuring 4.9 x 3.1 cm with downstream pancreatic atrophy and dilatation of the pancreatic duct. Adjacent fat stranding which may represent superimposed pancreatitis. The mass abuts the lesser curvature of the stomach.    < end of copied text >    < from: Xray Chest 1 View- PORTABLE-Urgent (03.25.21 @ 17:55) >    FINDINGS:    Right glenohumeral joint arthrosis. The cardiomediastinal silhouette is unremarkable. The lungs are clear. There is no pleural effusion or pneumothorax.    IMPRESSION:    Clear lungs.  EXAM:  XR CHEST PORTABLE URGENT 1V                            PROCEDURE DATE:  03/25/2021      < end of copied text >  PROTEIN CALORIE MALNUTRITION PRESENT: [ ]mild [ ]moderate [ ]severe [ ]underweight [ ]morbid obesity  https://www.andeal.org/vault/2440/web/files/ONC/Table_Clinical%20Characteristics%20to%20Document%20Malnutrition-White%20JV%20et%20al%202012.pdf    Height (cm): 154.9 (03-25-21 @ 15:47), 154.94 (08-27-20 @ 12:56)  Weight (kg): 63.5 (03-25-21 @ 15:47), 65.5 (08-27-20 @ 12:56)  BMI (kg/m2): 26.5 (03-25-21 @ 15:47), 27.3 (08-27-20 @ 12:56)    [ ]PPSV2 < or = to 30% [ ]significant weight loss  [ ]poor nutritional intake  [ ]anasarca     Albumin, Serum: 3.8 g/dL (03-25-21 @ 17:30)   [ ]Artificial Nutrition      REFERRALS:   [ ]Chaplaincy  [ ]Hospice  [ ]Child Life  [ ]Social Work  [ ]Case management [ ]Holistic Therapy     Goals of Care Document:

## 2021-03-26 NOTE — H&P ADULT - PROBLEM SELECTOR PLAN 3
Known from August 2020 and patient opting for comfort care. The mass is now bigger and in fact may be causing her more B symptoms. Will need to continue to have GOC discussion with son Hair. It seems she was made DNR/DNI and discharged on home hospice. We will need to confirm this

## 2021-03-26 NOTE — PROGRESS NOTE ADULT - SUBJECTIVE AND OBJECTIVE BOX
Steven Escoto MD  Division of [facility] Medicine  Cell: [phone]  Pager: [phone]  Office: [phone]     Steven Escoto MD  Division of Hospital Medicine  Cell: [phone]  Pager: [phone]  Office: [phone]    Patient is a 89y old  Female who presents with a chief complaint of Poor appetite (26 Mar 2021 11:13)        SUBJECTIVE / OVERNIGHT EVENTS: Patient reports poor appetite. Denies SOB or cough or abdominal pain or N/V.       MEDICATIONS  (STANDING):  dextrose 40% Gel 15 Gram(s) Oral once  dextrose 5%. 1000 milliLiter(s) (50 mL/Hr) IV Continuous <Continuous>  dextrose 5%. 1000 milliLiter(s) (100 mL/Hr) IV Continuous <Continuous>  dextrose 50% Injectable 25 Gram(s) IV Push once  dextrose 50% Injectable 12.5 Gram(s) IV Push once  dextrose 50% Injectable 25 Gram(s) IV Push once  enoxaparin Injectable 40 milliGRAM(s) SubCutaneous daily  glucagon  Injectable 1 milliGRAM(s) IntraMuscular once  influenza   Vaccine 0.5 milliLiter(s) IntraMuscular once  insulin glargine Injectable (LANTUS) 10 Unit(s) SubCutaneous every morning  insulin lispro (ADMELOG) corrective regimen sliding scale   SubCutaneous three times a day before meals  insulin lispro (ADMELOG) corrective regimen sliding scale   SubCutaneous at bedtime  insulin lispro Injectable (ADMELOG) 3 Unit(s) SubCutaneous three times a day before meals  lactated ringers. 1000 milliLiter(s) (100 mL/Hr) IV Continuous <Continuous>  levothyroxine 50 MICROGram(s) Oral daily  losartan 50 milliGRAM(s) Oral daily  multivitamin/minerals 1 Tablet(s) Oral daily  polyethylene glycol 3350 17 Gram(s) Oral two times a day  senna 2 Tablet(s) Oral at bedtime    MEDICATIONS  (PRN):  acetaminophen   Tablet .. 650 milliGRAM(s) Oral every 6 hours PRN Temp greater or equal to 38C (100.4F), Mild Pain (1 - 3), Moderate Pain (4 - 6)      Vital Signs Last 24 Hrs  T(C): 36.9 (26 Mar 2021 05:25), Max: 38.6 (25 Mar 2021 16:57)  T(F): 98.5 (26 Mar 2021 05:25), Max: 101.5 (25 Mar 2021 16:57)  HR: 64 (26 Mar 2021 05:25) (62 - 99)  BP: 144/64 (26 Mar 2021 05:25) (105/72 - 144/64)  BP(mean): --  RR: 18 (26 Mar 2021 05:25) (18 - 20)  SpO2: 97% (26 Mar 2021 05:25) (97% - 99%)  CAPILLARY BLOOD GLUCOSE      POCT Blood Glucose.: 209 mg/dL (26 Mar 2021 08:01)  POCT Blood Glucose.: 284 mg/dL (26 Mar 2021 00:22)  POCT Blood Glucose.: 398 mg/dL (25 Mar 2021 15:53)    I&O's Summary        PHYSICAL EXAM:   GENERAL: NAD, well-developed  HEAD:  Atraumatic, Normocephalic  EYES:  conjunctiva and sclera clear  NECK: Supple,    CHEST/LUNG: Clear to auscultation bilaterally; No wheeze  HEART: S1S2 normal. Regular rate and rhythm; No murmurs, rubs, or gallops  ABDOMEN: Soft, Nontender, Nondistended; Bowel sounds present  EXTREMITIES:   trace LE edema  PSYCH/Neuro: Awake and answers questions and follows commands. Moves extremities.   SKIN: No rashes or lesions      LABS:                        11.2   5.10  )-----------( 119      ( 26 Mar 2021 05:51 )             34.2     03-26    140  |  107  |  24<H>  ----------------------------<  234<H>  3.4<L>   |  19<L>  |  0.79    Ca    8.4      26 Mar 2021 05:51    TPro  7.0  /  Alb  3.8  /  TBili  0.4  /  DBili  x   /  AST  32  /  ALT  18  /  AlkPhos  69  03-25    PT/INR - ( 25 Mar 2021 17:53 )   PT: 13.0 sec;   INR: 1.09 ratio         PTT - ( 25 Mar 2021 17:53 )  PTT:28.8 sec      Urinalysis Basic - ( 26 Mar 2021 06:35 )    Color: Light Yellow / Appearance: Clear / SG: >1.050 / pH: x  Gluc: x / Ketone: Small  / Bili: Negative / Urobili: Negative   Blood: x / Protein: 30 mg/dL / Nitrite: Negative   Leuk Esterase: Negative / RBC: 2 /hpf / WBC 3 /HPF   Sq Epi: x / Non Sq Epi: 1 /hpf / Bacteria: Negative      cc< from: CT Abdomen and Pelvis w/ IV Cont (03.25.21 @ 19:14) >  IMPRESSION:  Marked enlargement of pancreatic mass since 08/24/2020 consistent with carcinoma. Possible superimposed pancreatitis.  Left lower lobe opacity which may be infectious    < end of copied text >      RADIOLOGY & ADDITIONAL TESTS:    Imaging Personally Reviewed: CT abd/pelvis report.   Consultant(s) Notes Reviewed:  palliative  Care Discussed with Consultants/Other Providers:

## 2021-03-26 NOTE — CHART NOTE - NSCHARTNOTEFT_GEN_A_CORE
Endocrinology recommendation:        -House endocrine was consulted by Night team to recommend insulin regimen as there was concern for DKA .  Spoke with endocrinology fellow who recommends low sliding scale.  Basal with Lantus 10units daily  Bolus : Admelog 3 units three times with meal.(Only if patient is eating)

## 2021-03-26 NOTE — CONSULT NOTE ADULT - PROBLEM SELECTOR RECOMMENDATION 9
With Dyspnea.   2/2 to COVID 19  d/W Primary team that agree on supportive Rx at this point. Since the patient is not hypoxemic, no Dexa or Remdesivir were indicated. Since not elevated WBC or fever will hold on Abx for now.   Will start O2 by NC  Will give Morphine 0.5 mg IV stat and will continue Morphine 0.5 mg IV q 2 PRN. If recurrent symptoms then increase Morphine to 1 mg IV q 2 PRN   Will also add Ativan 0.2 mg IV q 4 PRN for intractable symptoms and Glyco 0.4 mg IV q 4 PRN for secretions.

## 2021-03-26 NOTE — CONSULT NOTE ADULT - ASSESSMENT
89F with of pancreatic adenocarcinoma (diagnosed Aug. 2020, but opting for comfort measures), HTN, T2DM, and hypothyroidism presents to [facility] with one week history of weakness, decrease PO intake, weight loss and constipation. Patient last admitted on Aug. 2020 when she was noted to have pancreatic mass and eventually found to have adenocarcinoma. GOC discussion with son at that time revealed DNR/DNI and home hospice given her functional status was good and treatment would likely involve chemotherapy and/or whipple. When seen in ED patient reiterated those complaints. Initially, found to have glucose of 400, AG of 22 and BHB. She was given 2 L NS and 6 units lispro and now AG is closed. She was incidentally found to be COVID-19 positive. She denies cough or shortness of breath. MICU saw patient and deemed not a candidate. Medication reconciliation reviews patient is only on Glipizide 4 mg daily. Palliative care was called for GOC and inpatient hospice eval.

## 2021-03-26 NOTE — CONSULT NOTE ADULT - PROBLEM SELECTOR RECOMMENDATION 5
Hospice referral for symptoms monitoring and Rx at inpatient hospice was done.   Will wait for hospice eval.         Chu Smith MD   Geriatrics and Palliative Care (GAP) Consult Service    of Geriatric and Palliative Medicine  [facility]      Please page the following number for clinical matters between the hours of 9 am and 5 pm from Monday through Friday : [phone]    After 5pm and on weekends, please see the contact information below:    In the event of newly developing, evolving, or worsening symptoms, please contact the Palliative Medicine team via pager (if the patient is at [facility] #[phone] or if the patient is at [facility] #[phone]) The Geriatric and Palliative Medicine service has coverage 24 hours a day/ 7 days a week to provide medical recommendations regarding symptom management needs via telephone

## 2021-03-26 NOTE — H&P ADULT - PROBLEM SELECTOR PLAN 1
This likely occurred because enlarging pancreatic mass likely causing pancreatic insulin production/secretion dysfunction. In addition, patient is only on a sulfonylurea which increases insulin secretion, so if there is an issue with production this would be highly ineffective. In addition, her last A1c was 12+ indicating poor control already    -Start Lantus 12 units daily and Lispro 4 units prior to meals  -FS TID AC & Insulin sliding scale  -Patient still appears dehydrated: 100 cc/hr LR x10 hours  -Hemoglobin A1c for risk stratification  -Will discuss with son GOC and possible basal-bolus regimen on discharge, otherwise Metformin and Januvia may be preferable  -Consider endocrine consult This likely occurred because enlarging pancreatic mass likely causing pancreatic insulin production/secretion dysfunction. In addition, patient is only on a sulfonylurea which increases insulin secretion, so if there is an issue with production this would be highly ineffective. In addition, her last A1c was 12+ indicating poor control already    -Start Lantus 12 units daily and Lispro 4 units prior to meals  -FS TID AC & Insulin sliding scale  -Patient still appears dehydrated: 100 cc/hr LR x10 hours  -Hemoglobin A1c for risk stratification  -Will discuss with son CORAZON and possible basal-bolus regimen on discharge  -House endocrine consult to recommend insulin regimen vs. oral

## 2021-03-26 NOTE — H&P ADULT - HISTORY OF PRESENT ILLNESS
89F with PMHx of pancreatic adenocarcinoma (diagnosed Aug. 2020, but opting for comfort measures), HTN, T2DM, and hypothyroidism presents to [facility] with one week history of weakness, decrease PO intake, weight loss and constipation. Patient last admitted on Aug. 2020 when she was noted to have pancreatic mass and eventually found to have adenocarcinoma. [facility] discussion with son at that time revealed DNR/DNI and home hospice given her functional status was good and treatment would likely involve chemotherapy and/or whipple.     When seen in ED patient reiterated those complaints. Initially, found to have glucose of 400, AG of 22 and BHB. She was given 2 L NS and 6 units lispro and now AG is closed. She was incidentally found to be COVID-19 positive. She denies cough or shortness of breath. MICU saw patient and deemed not a candidate. Medication reconciliation reviews patient is only on Glipizide 4 mg daily.

## 2021-03-26 NOTE — CHART NOTE - NSCHARTNOTEFT_GEN_A_CORE
Discussed with primary team, Endocrine consult cancelled as patient is being evaluated for inpatient hospice.   Please recall as needed.     Julee Agarwal MD  Endocrine Fellow   Please call  with further questions

## 2021-03-26 NOTE — CONSULT NOTE ADULT - PROBLEM SELECTOR RECOMMENDATION 2
Not a candidate for DMT.   With progression of disease and now the mass abuts the lesser curvature of the stomach.

## 2021-03-26 NOTE — ED ADULT NURSE REASSESSMENT NOTE - NS ED NURSE REASSESS COMMENT FT1
Pt placed on primafit. Pt updated on plan of care. Fall and safety precautions in place, NSR on tele, call bell within reach.

## 2021-03-27 LAB
CULTURE RESULTS: SIGNIFICANT CHANGE UP
GLUCOSE BLDC GLUCOMTR-MCNC: 102 MG/DL — HIGH (ref 70–99)
GLUCOSE BLDC GLUCOMTR-MCNC: 106 MG/DL — HIGH (ref 70–99)
GLUCOSE BLDC GLUCOMTR-MCNC: 116 MG/DL — HIGH (ref 70–99)
GLUCOSE BLDC GLUCOMTR-MCNC: 144 MG/DL — HIGH (ref 70–99)
SPECIMEN SOURCE: SIGNIFICANT CHANGE UP

## 2021-03-27 PROCEDURE — 99233 SBSQ HOSP IP/OBS HIGH 50: CPT

## 2021-03-27 RX ORDER — MIRTAZAPINE 45 MG/1
7.5 TABLET, ORALLY DISINTEGRATING ORAL AT BEDTIME
Refills: 0 | Status: DISCONTINUED | OUTPATIENT
Start: 2021-03-27 | End: 2021-03-31

## 2021-03-27 RX ORDER — ACETAMINOPHEN 500 MG
1000 TABLET ORAL ONCE
Refills: 0 | Status: COMPLETED | OUTPATIENT
Start: 2021-03-27 | End: 2021-03-27

## 2021-03-27 RX ADMIN — Medication 650 MILLIGRAM(S): at 22:07

## 2021-03-27 RX ADMIN — POLYETHYLENE GLYCOL 3350 17 GRAM(S): 17 POWDER, FOR SOLUTION ORAL at 17:09

## 2021-03-27 RX ADMIN — POLYETHYLENE GLYCOL 3350 17 GRAM(S): 17 POWDER, FOR SOLUTION ORAL at 05:55

## 2021-03-27 RX ADMIN — Medication 1000 MILLIGRAM(S): at 02:52

## 2021-03-27 RX ADMIN — SENNA PLUS 2 TABLET(S): 8.6 TABLET ORAL at 22:07

## 2021-03-27 RX ADMIN — INSULIN GLARGINE 10 UNIT(S): 100 INJECTION, SOLUTION SUBCUTANEOUS at 08:47

## 2021-03-27 RX ADMIN — Medication 50 MICROGRAM(S): at 05:55

## 2021-03-27 RX ADMIN — Medication 650 MILLIGRAM(S): at 22:10

## 2021-03-27 RX ADMIN — Medication 1 TABLET(S): at 10:52

## 2021-03-27 RX ADMIN — Medication 3 UNIT(S): at 12:27

## 2021-03-27 RX ADMIN — MIRTAZAPINE 7.5 MILLIGRAM(S): 45 TABLET, ORALLY DISINTEGRATING ORAL at 22:07

## 2021-03-27 RX ADMIN — ENOXAPARIN SODIUM 40 MILLIGRAM(S): 100 INJECTION SUBCUTANEOUS at 11:09

## 2021-03-27 RX ADMIN — LOSARTAN POTASSIUM 50 MILLIGRAM(S): 100 TABLET, FILM COATED ORAL at 05:55

## 2021-03-27 RX ADMIN — Medication 400 MILLIGRAM(S): at 01:33

## 2021-03-27 NOTE — DIETITIAN INITIAL EVALUATION ADULT. - PERTINENT LABORATORY DATA
3/26-3/27: POCT Gluc: 144, 106, 112, 158, 227  3/26: Potassium: 3.4 L, BUN: 24 H, Gluc: 234 H, HbA1c: 13.1% H

## 2021-03-27 NOTE — DIETITIAN NUTRITION RISK NOTIFICATION - TREATMENT: THE FOLLOWING DIET HAS BEEN RECOMMENDED
Diet, Regular:   Consistent Carbohydrate {Evening Snack} (CSTCHOSN)  Supplement Feeding Modality:  Oral  Glucerna Shake Cans or Servings Per Day:  1       Frequency:  Two Times a day (03-26-21 @ 11:18) [Active]

## 2021-03-27 NOTE — DIETITIAN INITIAL EVALUATION ADULT. - OTHER INFO
Upon RD visit, RD observed pt with lunch tray setup in front of her but mostly untouched. Pt is not answering any RD questions at this time. Per discussion with pt's nurse, pt has had a poor appetite since admit, not eating much of anything. Reports pt took a few sips of Glucerna oral nutrition supplement with encouragement but is declining to eat solid foods. Pt's nurse reports pt is able to take her medications at this time. Per nurse, pt is likely going to be going home with hospice care, they are going to re-evaluate the pt on Monday.    Unclear of pt's UBW; per Parker VASQUEZ, weights as follows: 144 pounds (8/27/20), 140 pounds (3/25/21). This is a 2.7% weight change x 7 months (not clinically significant at this time). No additional weights noted at this time.    GI: Per pt's nurse, no nausea, vomiting or diarrhea, however pt with constipation; no bowel movement yet. Noted pt on bowel regimen.    Pt's blood glucose currently being managed with lantus and admelog.

## 2021-03-27 NOTE — DIETITIAN INITIAL EVALUATION ADULT. - PERTINENT MEDS FT
MEDICATIONS  (STANDING):  dextrose 40% Gel 15 Gram(s) Oral once  dextrose 5%. 1000 milliLiter(s) (50 mL/Hr) IV Continuous <Continuous>  dextrose 5%. 1000 milliLiter(s) (100 mL/Hr) IV Continuous <Continuous>  dextrose 50% Injectable 25 Gram(s) IV Push once  dextrose 50% Injectable 12.5 Gram(s) IV Push once  dextrose 50% Injectable 25 Gram(s) IV Push once  enoxaparin Injectable 40 milliGRAM(s) SubCutaneous daily  glucagon  Injectable 1 milliGRAM(s) IntraMuscular once  insulin glargine Injectable (LANTUS) 10 Unit(s) SubCutaneous every morning  insulin lispro (ADMELOG) corrective regimen sliding scale   SubCutaneous three times a day before meals  insulin lispro (ADMELOG) corrective regimen sliding scale   SubCutaneous at bedtime  insulin lispro Injectable (ADMELOG) 3 Unit(s) SubCutaneous three times a day before meals  lactated ringers. 1000 milliLiter(s) (100 mL/Hr) IV Continuous <Continuous>  levothyroxine 50 MICROGram(s) Oral daily  losartan 50 milliGRAM(s) Oral daily  multivitamin/minerals 1 Tablet(s) Oral daily  polyethylene glycol 3350 17 Gram(s) Oral two times a day  senna 2 Tablet(s) Oral at bedtime    MEDICATIONS  (PRN):  acetaminophen   Tablet .. 650 milliGRAM(s) Oral every 6 hours PRN Temp greater or equal to 38C (100.4F), Mild Pain (1 - 3), Moderate Pain (4 - 6)  glycopyrrolate Injectable 0.4 milliGRAM(s) IV Push every 4 hours PRN Secretions.  LORazepam   Injectable 0.2 milliGRAM(s) IV Push every 4 hours PRN Anxiety, Agitation, or intractable respiratory distress  morphine  - Injectable 0.5 milliGRAM(s) IV Push every 2 hours PRN Labored Breating

## 2021-03-27 NOTE — DIETITIAN INITIAL EVALUATION ADULT. - REASON INDICATOR FOR ASSESSMENT
Nutrition Consult for "Significant Decrease of Oral intake >3 Days PTA"  Source: medical record, nurse; pt not answering RD questions during interview; noted pt also speaks Greek-creole, however per flow sheets, pt can communicate in english and was not understanding use of  phone on previous attempts.

## 2021-03-27 NOTE — DIETITIAN INITIAL EVALUATION ADULT. - CHIEF COMPLAINT
"89F with PMHx of pancreatic adenocarcinoma (diagnosed Aug. 2020, but opting for comfort measures), HTN, T2DM, and hypothyroidism presents to [facility] with one week history of weakness, decrease PO intake, weight loss and constipation. Patient found to be hyperglycemic with ketones and anion gap which have resolved. CT A&P showing enlarged known pancreatic mass that is adenocarcinoma. She was incidentally found to be COVID-19 positive. Symptoms likely secondary to hyperglycemia, progression of pancreatic cancer, and possibly non-specific COVID symptoms"

## 2021-03-27 NOTE — DIETITIAN INITIAL EVALUATION ADULT. - PROBLEM SELECTOR PLAN 1
This likely occurred because enlarging pancreatic mass likely causing pancreatic insulin production/secretion dysfunction. In addition, patient is only on a sulfonylurea which increases insulin secretion, so if there is an issue with production this would be highly ineffective. In addition, her last A1c was 12+ indicating poor control already    -Start Lantus 12 units daily and Lispro 4 units prior to meals  -FS TID AC & Insulin sliding scale  -Patient still appears dehydrated: 100 cc/hr LR x10 hours  -Hemoglobin A1c for risk stratification  -Will discuss with son CORAZON and possible basal-bolus regimen on discharge  -House endocrine consult to recommend insulin regimen vs. oral

## 2021-03-27 NOTE — DIETITIAN INITIAL EVALUATION ADULT. - REASON
Nutrition Focused Physical exam deferred at this time; pt unable to give consent; noted pt with mild temporal wasting upon visual exam

## 2021-03-27 NOTE — DIETITIAN INITIAL EVALUATION ADULT. - ORAL INTAKE PTA/DIET HISTORY
Unable to gather information from pt as she was not answering RD questions upon interview. Per H&P, pt with poor appetite x1 week PTA accompanied with weight loss and constipation. No known food allergies per chart review. No Hx of chewing or swallowing issues. Per H&P, pt taking Vit B12 supplementation. Pt with history of diabetes, noted taking glipizide and metformin for glycemic control. Most recent HbA1c of 13.1% indicating suboptimal glycemic control (previous 12.6% from 8/2020).

## 2021-03-27 NOTE — PROVIDER CONTACT NOTE (OTHER) - BACKGROUND
pt presents with weakness, decreased PO intake and positive COVID. This was her 1st episode of fever.

## 2021-03-27 NOTE — DIETITIAN INITIAL EVALUATION ADULT. - FEEDING ASSISTANCE
2. Recommend nursing to continue to encourage adequate intake and provide feeding assistance at meals as needed

## 2021-03-27 NOTE — DIETITIAN INITIAL EVALUATION ADULT. - ETIOLOGY
decreased ability to consume sufficient energy/protein, metabolic demand for nutrients secondary to adenocarcinoma of pancreas endocrine dysfunction/ pancreatic mass

## 2021-03-27 NOTE — PROGRESS NOTE ADULT - SUBJECTIVE AND OBJECTIVE BOX
Patient is a 89y old  Female who presents with a chief complaint of Poor appetite (27 Mar 2021 16:46)        SUBJECTIVE / OVERNIGHT EVENTS: patient reports still having poor appetite. denies SOB.       MEDICATIONS  (STANDING):  dextrose 40% Gel 15 Gram(s) Oral once  dextrose 5%. 1000 milliLiter(s) (50 mL/Hr) IV Continuous <Continuous>  dextrose 5%. 1000 milliLiter(s) (100 mL/Hr) IV Continuous <Continuous>  dextrose 50% Injectable 25 Gram(s) IV Push once  dextrose 50% Injectable 12.5 Gram(s) IV Push once  dextrose 50% Injectable 25 Gram(s) IV Push once  enoxaparin Injectable 40 milliGRAM(s) SubCutaneous daily  glucagon  Injectable 1 milliGRAM(s) IntraMuscular once  insulin glargine Injectable (LANTUS) 10 Unit(s) SubCutaneous every morning  insulin lispro (ADMELOG) corrective regimen sliding scale   SubCutaneous three times a day before meals  insulin lispro (ADMELOG) corrective regimen sliding scale   SubCutaneous at bedtime  insulin lispro Injectable (ADMELOG) 3 Unit(s) SubCutaneous three times a day before meals  lactated ringers. 1000 milliLiter(s) (100 mL/Hr) IV Continuous <Continuous>  levothyroxine 50 MICROGram(s) Oral daily  losartan 50 milliGRAM(s) Oral daily  mirtazapine 7.5 milliGRAM(s) Oral at bedtime  multivitamin/minerals 1 Tablet(s) Oral daily  polyethylene glycol 3350 17 Gram(s) Oral two times a day  senna 2 Tablet(s) Oral at bedtime    MEDICATIONS  (PRN):  acetaminophen   Tablet .. 650 milliGRAM(s) Oral every 6 hours PRN Temp greater or equal to 38C (100.4F), Mild Pain (1 - 3), Moderate Pain (4 - 6)  glycopyrrolate Injectable 0.4 milliGRAM(s) IV Push every 4 hours PRN Secretions.  LORazepam   Injectable 0.2 milliGRAM(s) IV Push every 4 hours PRN Anxiety, Agitation, or intractable respiratory distress  morphine  - Injectable 0.5 milliGRAM(s) IV Push every 2 hours PRN Labored Breating      Vital Signs Last 24 Hrs  T(C): 37.9 (27 Mar 2021 21:48), Max: 39.3 (27 Mar 2021 01:10)  T(F): 100.2 (27 Mar 2021 21:48), Max: 102.7 (27 Mar 2021 01:10)  HR: 70 (27 Mar 2021 21:48) (59 - 70)  BP: 150/76 (27 Mar 2021 21:48) (135/70 - 159/77)  BP(mean): --  RR: 18 (27 Mar 2021 21:48) (18 - 20)  SpO2: 95% (27 Mar 2021 21:48) (95% - 97%)  CAPILLARY BLOOD GLUCOSE      POCT Blood Glucose.: 116 mg/dL (27 Mar 2021 21:47)  POCT Blood Glucose.: 102 mg/dL (27 Mar 2021 17:00)  POCT Blood Glucose.: 144 mg/dL (27 Mar 2021 12:19)  POCT Blood Glucose.: 106 mg/dL (27 Mar 2021 07:57)    I&O's Summary    26 Mar 2021 07:01  -  27 Mar 2021 07:00  --------------------------------------------------------  IN: 240 mL / OUT: 450 mL / NET: -210 mL    27 Mar 2021 07:01  -  27 Mar 2021 22:37  --------------------------------------------------------  IN: 250 mL / OUT: 350 mL / NET: -100 mL          PHYSICAL EXAM:   GENERAL: NAD, well-developed  HEAD:  Atraumatic, Normocephalic  EYES: conjunctiva and sclera clear  NECK: Supple   CHEST/LUNG: Clear to auscultation bilaterally; No wheeze  HEART: S1S2 normal. Regular rate and rhythm; No murmurs, rubs, or gallops  ABDOMEN: Soft, Nontender, Nondistended; Bowel sounds present  EXTREMITIES:    No clubbing, cyanosis, or edema  PSYCH/Neuro: Awake and answers questions.   SKIN: No rashes or lesions      LABS:                        11.2   5.10  )-----------( 119      ( 26 Mar 2021 05:51 )             34.2     03-26    140  |  107  |  24<H>  ----------------------------<  234<H>  3.4<L>   |  19<L>  |  0.79    Ca    8.4      26 Mar 2021 05:51            Urinalysis Basic - ( 26 Mar 2021 06:35 )    Color: Light Yellow / Appearance: Clear / SG: >1.050 / pH: x  Gluc: x / Ketone: Small  / Bili: Negative / Urobili: Negative   Blood: x / Protein: 30 mg/dL / Nitrite: Negative   Leuk Esterase: Negative / RBC: 2 /hpf / WBC 3 /HPF   Sq Epi: x / Non Sq Epi: 1 /hpf / Bacteria: Negative          RADIOLOGY & ADDITIONAL TESTS:    Imaging Personally Reviewed:  Consultant(s) Notes Reviewed:  palliative  Care Discussed with Consultants/Other Providers:

## 2021-03-27 NOTE — DIETITIAN INITIAL EVALUATION ADULT. - ADD RECOMMEND
3. Monitor diet, PO intake, GI status, weight, labs, skin integrity 4. Honor pt/ family GOC at this time 5. Malnutrition notification placed in chart

## 2021-03-28 LAB
GLUCOSE BLDC GLUCOMTR-MCNC: 147 MG/DL — HIGH (ref 70–99)
GLUCOSE BLDC GLUCOMTR-MCNC: 149 MG/DL — HIGH (ref 70–99)
GLUCOSE BLDC GLUCOMTR-MCNC: 167 MG/DL — HIGH (ref 70–99)
GLUCOSE BLDC GLUCOMTR-MCNC: 246 MG/DL — HIGH (ref 70–99)

## 2021-03-28 PROCEDURE — 99232 SBSQ HOSP IP/OBS MODERATE 35: CPT

## 2021-03-28 RX ADMIN — Medication 50 MICROGRAM(S): at 05:12

## 2021-03-28 RX ADMIN — Medication 1 TABLET(S): at 12:37

## 2021-03-28 RX ADMIN — Medication 3 UNIT(S): at 12:37

## 2021-03-28 RX ADMIN — Medication 2: at 17:30

## 2021-03-28 RX ADMIN — ENOXAPARIN SODIUM 40 MILLIGRAM(S): 100 INJECTION SUBCUTANEOUS at 12:37

## 2021-03-28 RX ADMIN — POLYETHYLENE GLYCOL 3350 17 GRAM(S): 17 POWDER, FOR SOLUTION ORAL at 17:29

## 2021-03-28 RX ADMIN — Medication 3 UNIT(S): at 17:29

## 2021-03-28 RX ADMIN — Medication 3 UNIT(S): at 08:26

## 2021-03-28 RX ADMIN — Medication 650 MILLIGRAM(S): at 21:59

## 2021-03-28 RX ADMIN — SENNA PLUS 2 TABLET(S): 8.6 TABLET ORAL at 21:36

## 2021-03-28 RX ADMIN — INSULIN GLARGINE 10 UNIT(S): 100 INJECTION, SOLUTION SUBCUTANEOUS at 08:25

## 2021-03-28 RX ADMIN — MIRTAZAPINE 7.5 MILLIGRAM(S): 45 TABLET, ORALLY DISINTEGRATING ORAL at 21:36

## 2021-03-28 RX ADMIN — LOSARTAN POTASSIUM 50 MILLIGRAM(S): 100 TABLET, FILM COATED ORAL at 05:12

## 2021-03-28 RX ADMIN — POLYETHYLENE GLYCOL 3350 17 GRAM(S): 17 POWDER, FOR SOLUTION ORAL at 05:12

## 2021-03-28 NOTE — PHYSICAL THERAPY INITIAL EVALUATION ADULT - ADDITIONAL COMMENTS
Pt mostly non-verbal during PT session, occasionally grunting, Per RN Yenni, pt able to communicate, however does not typically communicate with staff. Per CM note pt lives alone in a private house with 8 steps to enter & 1st floor set up. Pt was independent with all mobility & ADLs prior to admit. Pt was candidate for home hospice, however nothing established prior to admit.

## 2021-03-28 NOTE — PHYSICAL THERAPY INITIAL EVALUATION ADULT - PERTINENT HX OF CURRENT PROBLEM, REHAB EVAL
89F with PMHx of pancreatic adenocarcinoma (diagnosed 8/2020, comfort measures), HTN, T2DM, and hypothyroidism presents to [facility] with  weakness, decrease PO intake, weight loss and constipation. Pt found to be hyperglycemic with ketones and anion gap which have resolved. CT A&P showing enlarged known pancreatic mass that is adenocarcinoma. Incidentally found to be COVID-19 +. Symptoms likely 2/2 to hyperglycemia, progression of pancreatic cancer, and possibly non-specific COVID symptoms.

## 2021-03-28 NOTE — PHYSICAL THERAPY INITIAL EVALUATION ADULT - GAIT DEVIATIONS NOTED, PT EVAL
decreased joyce/increased time in double stance/decreased step length/decreased stride length/decreased swing-to-stance ratio/decreased weight-shifting ability

## 2021-03-28 NOTE — PROGRESS NOTE ADULT - SUBJECTIVE AND OBJECTIVE BOX
Patient is a 89y old  Female who presents with a chief complaint of Poor appetite (28 Mar 2021 17:11)        SUBJECTIVE / OVERNIGHT EVENTS: Appetite still poor. Denies breathing difficulty.       MEDICATIONS  (STANDING):  dextrose 40% Gel 15 Gram(s) Oral once  dextrose 5%. 1000 milliLiter(s) (50 mL/Hr) IV Continuous <Continuous>  dextrose 5%. 1000 milliLiter(s) (100 mL/Hr) IV Continuous <Continuous>  dextrose 50% Injectable 25 Gram(s) IV Push once  dextrose 50% Injectable 12.5 Gram(s) IV Push once  dextrose 50% Injectable 25 Gram(s) IV Push once  enoxaparin Injectable 40 milliGRAM(s) SubCutaneous daily  glucagon  Injectable 1 milliGRAM(s) IntraMuscular once  insulin glargine Injectable (LANTUS) 10 Unit(s) SubCutaneous every morning  insulin lispro (ADMELOG) corrective regimen sliding scale   SubCutaneous three times a day before meals  insulin lispro (ADMELOG) corrective regimen sliding scale   SubCutaneous at bedtime  insulin lispro Injectable (ADMELOG) 3 Unit(s) SubCutaneous three times a day before meals  lactated ringers. 1000 milliLiter(s) (100 mL/Hr) IV Continuous <Continuous>  levothyroxine 50 MICROGram(s) Oral daily  losartan 50 milliGRAM(s) Oral daily  mirtazapine 7.5 milliGRAM(s) Oral at bedtime  multivitamin/minerals 1 Tablet(s) Oral daily  polyethylene glycol 3350 17 Gram(s) Oral two times a day  senna 2 Tablet(s) Oral at bedtime    MEDICATIONS  (PRN):  acetaminophen   Tablet .. 650 milliGRAM(s) Oral every 6 hours PRN Temp greater or equal to 38C (100.4F), Mild Pain (1 - 3), Moderate Pain (4 - 6)  glycopyrrolate Injectable 0.4 milliGRAM(s) IV Push every 4 hours PRN Secretions.  LORazepam   Injectable 0.2 milliGRAM(s) IV Push every 4 hours PRN Anxiety, Agitation, or intractable respiratory distress  morphine  - Injectable 0.5 milliGRAM(s) IV Push every 2 hours PRN Labored Breating      Vital Signs Last 24 Hrs  T(C): 36.9 (29 Mar 2021 05:17), Max: 38 (28 Mar 2021 21:56)  T(F): 98.4 (29 Mar 2021 05:17), Max: 100.4 (28 Mar 2021 21:56)  HR: 66 (29 Mar 2021 05:17) (64 - 72)  BP: 155/79 (29 Mar 2021 05:17) (138/73 - 160/73)  BP(mean): --  RR: 20 (29 Mar 2021 05:17) (18 - 20)  SpO2: 90% (29 Mar 2021 05:17) (89% - 96%)  CAPILLARY BLOOD GLUCOSE      POCT Blood Glucose.: 167 mg/dL (28 Mar 2021 21:35)  POCT Blood Glucose.: 246 mg/dL (28 Mar 2021 16:54)  POCT Blood Glucose.: 149 mg/dL (28 Mar 2021 12:14)  POCT Blood Glucose.: 147 mg/dL (28 Mar 2021 07:54)    I&O's Summary    28 Mar 2021 07:01  -  29 Mar 2021 07:00  --------------------------------------------------------  IN: 550 mL / OUT: 400 mL / NET: 150 mL          PHYSICAL EXAM:   GENERAL: NAD, well-developed  HEAD:  Atraumatic, Normocephalic  EYES: conjunctiva and sclera clear  NECK: Supple,    CHEST/LUNG: Clear to auscultation bilaterally; No wheeze  HEART: S1S2 normal. Regular rate and rhythm; No murmurs, rubs, or gallops  ABDOMEN: Soft, Nontender, Nondistended; Bowel sounds present  EXTREMITIES:    No clubbing, cyanosis, or edema  PSYCH/Neuro: Awake and answers basic questions. Hard of hearing.   SKIN: No rashes or lesions      LABS:                      RADIOLOGY & ADDITIONAL TESTS:    Imaging Personally Reviewed:  Consultant(s) Notes Reviewed:    Care Discussed with Consultants/Other Providers:

## 2021-03-28 NOTE — PHYSICAL THERAPY INITIAL EVALUATION ADULT - GENERAL OBSERVATIONS, REHAB EVAL
Pt tolerated 45min PT initial evaluation fair. Pt a/w FTT 2/2 pancreatic ca (comfort measures), incidentally COVID-19 +. Rec'd in bed in NAD, agreeable to PT.

## 2021-03-28 NOTE — PHYSICAL THERAPY INITIAL EVALUATION ADULT - DISCHARGE DISPOSITION, PT EVAL
TBD pending hospital course, pt pending hospice consult. IF pt/family opt for non-hospice intervention recommend Subacute Rehab; Pt with limitations in self-care & home management, will benefit from Sub acute rehab prior to D/C home to increase strength, balance and endurance to improve functional mobility to level necessary for safe return home.

## 2021-03-29 LAB
GLUCOSE BLDC GLUCOMTR-MCNC: 152 MG/DL — HIGH (ref 70–99)
GLUCOSE BLDC GLUCOMTR-MCNC: 162 MG/DL — HIGH (ref 70–99)
GLUCOSE BLDC GLUCOMTR-MCNC: 180 MG/DL — HIGH (ref 70–99)
GLUCOSE BLDC GLUCOMTR-MCNC: 187 MG/DL — HIGH (ref 70–99)

## 2021-03-29 PROCEDURE — 99233 SBSQ HOSP IP/OBS HIGH 50: CPT

## 2021-03-29 RX ORDER — DEXAMETHASONE 0.5 MG/5ML
6 ELIXIR ORAL DAILY
Refills: 0 | Status: DISCONTINUED | OUTPATIENT
Start: 2021-03-29 | End: 2021-03-31

## 2021-03-29 RX ADMIN — POLYETHYLENE GLYCOL 3350 17 GRAM(S): 17 POWDER, FOR SOLUTION ORAL at 05:05

## 2021-03-29 RX ADMIN — Medication 3 UNIT(S): at 08:14

## 2021-03-29 RX ADMIN — LOSARTAN POTASSIUM 50 MILLIGRAM(S): 100 TABLET, FILM COATED ORAL at 05:05

## 2021-03-29 RX ADMIN — Medication 50 MICROGRAM(S): at 05:05

## 2021-03-29 RX ADMIN — Medication 1: at 12:29

## 2021-03-29 RX ADMIN — Medication 6 MILLIGRAM(S): at 17:48

## 2021-03-29 RX ADMIN — ENOXAPARIN SODIUM 40 MILLIGRAM(S): 100 INJECTION SUBCUTANEOUS at 11:44

## 2021-03-29 RX ADMIN — INSULIN GLARGINE 10 UNIT(S): 100 INJECTION, SOLUTION SUBCUTANEOUS at 08:13

## 2021-03-29 RX ADMIN — SENNA PLUS 2 TABLET(S): 8.6 TABLET ORAL at 21:38

## 2021-03-29 RX ADMIN — Medication 1 TABLET(S): at 11:44

## 2021-03-29 RX ADMIN — Medication 1: at 08:14

## 2021-03-29 RX ADMIN — POLYETHYLENE GLYCOL 3350 17 GRAM(S): 17 POWDER, FOR SOLUTION ORAL at 17:48

## 2021-03-29 RX ADMIN — MIRTAZAPINE 7.5 MILLIGRAM(S): 45 TABLET, ORALLY DISINTEGRATING ORAL at 21:38

## 2021-03-29 NOTE — PROGRESS NOTE ADULT - SUBJECTIVE AND OBJECTIVE BOX
Patient is a 89y old  Female who presents with a chief complaint of Poor appetite (28 Mar 2021 17:11)      SUBJECTIVE / OVERNIGHT EVENTS: Patient seen and examined at bedside. States that she is ok, states she does not feel like eating. Denies any CP, SOB, abd pain and n/v. Satting 90%    ROS:  All other review of systems negative    Allergies    No Known Allergies    Intolerances        MEDICATIONS  (STANDING):  dextrose 40% Gel 15 Gram(s) Oral once  dextrose 5%. 1000 milliLiter(s) (50 mL/Hr) IV Continuous <Continuous>  dextrose 5%. 1000 milliLiter(s) (100 mL/Hr) IV Continuous <Continuous>  dextrose 50% Injectable 25 Gram(s) IV Push once  dextrose 50% Injectable 12.5 Gram(s) IV Push once  dextrose 50% Injectable 25 Gram(s) IV Push once  enoxaparin Injectable 40 milliGRAM(s) SubCutaneous daily  glucagon  Injectable 1 milliGRAM(s) IntraMuscular once  insulin glargine Injectable (LANTUS) 10 Unit(s) SubCutaneous every morning  insulin lispro (ADMELOG) corrective regimen sliding scale   SubCutaneous three times a day before meals  insulin lispro (ADMELOG) corrective regimen sliding scale   SubCutaneous at bedtime  insulin lispro Injectable (ADMELOG) 3 Unit(s) SubCutaneous three times a day before meals  lactated ringers. 1000 milliLiter(s) (100 mL/Hr) IV Continuous <Continuous>  levothyroxine 50 MICROGram(s) Oral daily  losartan 50 milliGRAM(s) Oral daily  mirtazapine 7.5 milliGRAM(s) Oral at bedtime  multivitamin/minerals 1 Tablet(s) Oral daily  polyethylene glycol 3350 17 Gram(s) Oral two times a day  senna 2 Tablet(s) Oral at bedtime    MEDICATIONS  (PRN):  acetaminophen   Tablet .. 650 milliGRAM(s) Oral every 6 hours PRN Temp greater or equal to 38C (100.4F), Mild Pain (1 - 3), Moderate Pain (4 - 6)  glycopyrrolate Injectable 0.4 milliGRAM(s) IV Push every 4 hours PRN Secretions.  LORazepam   Injectable 0.2 milliGRAM(s) IV Push every 4 hours PRN Anxiety, Agitation, or intractable respiratory distress  morphine  - Injectable 0.5 milliGRAM(s) IV Push every 2 hours PRN Labored Breating      Vital Signs Last 24 Hrs  T(C): 36.9 (29 Mar 2021 05:17), Max: 38 (28 Mar 2021 21:56)  T(F): 98.4 (29 Mar 2021 05:17), Max: 100.4 (28 Mar 2021 21:56)  HR: 66 (29 Mar 2021 05:17) (64 - 72)  BP: 155/79 (29 Mar 2021 05:17) (138/73 - 160/73)  BP(mean): --  RR: 20 (29 Mar 2021 05:17) (18 - 20)  SpO2: 90% (29 Mar 2021 05:17) (90% - 96%)  CAPILLARY BLOOD GLUCOSE      POCT Blood Glucose.: 180 mg/dL (29 Mar 2021 11:43)  POCT Blood Glucose.: 187 mg/dL (29 Mar 2021 07:59)  POCT Blood Glucose.: 167 mg/dL (28 Mar 2021 21:35)  POCT Blood Glucose.: 246 mg/dL (28 Mar 2021 16:54)    I&O's Summary    28 Mar 2021 07:01  -  29 Mar 2021 07:00  --------------------------------------------------------  IN: 550 mL / OUT: 400 mL / NET: 150 mL    29 Mar 2021 07:01  -  29 Mar 2021 13:36  --------------------------------------------------------  IN: 200 mL / OUT: 0 mL / NET: 200 mL        PHYSICAL EXAM:  GENERAL: NAD, well-developed, +2L NC  HEAD:  Atraumatic, Normocephalic  EYES: EOMI, PERRLA, conjunctiva and sclera clear  NECK: Supple, No JVD  CHEST/LUNG: Clear to auscultation bilaterally; No wheeze  HEART: Regular rate and rhythm; No murmurs, rubs, or gallops  ABDOMEN: Soft, Nontender, Nondistended; Bowel sounds present  EXTREMITIES:  2+ Peripheral Pulses, No clubbing, cyanosis, or edema  NEUROLOGY: AAOx2 (self and place), non-focal  PSYCH: calm  SKIN: No rashes or lesions    LABS:                    RADIOLOGY & ADDITIONAL TESTS:  Consultant(s) Notes Reviewed:  Hospice rec noted    Care Discussed with Consultants/Other Providers: Medicine NP     Case Discussed with Son Hair

## 2021-03-29 NOTE — PROGRESS NOTE ADULT - PROBLEM SELECTOR PLAN 6
-C/w standing Miralax & Senna    Dispo: pending clinical improvement, wean off o2, d/w to [facility]    d/w medicine PAULA Franco and YUSUF Bolden

## 2021-03-30 DIAGNOSIS — R73.9 HYPERGLYCEMIA, UNSPECIFIED: ICD-10-CM

## 2021-03-30 DIAGNOSIS — I10 ESSENTIAL (PRIMARY) HYPERTENSION: ICD-10-CM

## 2021-03-30 DIAGNOSIS — K59.00 CONSTIPATION, UNSPECIFIED: ICD-10-CM

## 2021-03-30 DIAGNOSIS — E11.65 TYPE 2 DIABETES MELLITUS WITH HYPERGLYCEMIA: ICD-10-CM

## 2021-03-30 DIAGNOSIS — E03.9 HYPOTHYROIDISM, UNSPECIFIED: ICD-10-CM

## 2021-03-30 LAB
ANION GAP SERPL CALC-SCNC: 18 MMOL/L — HIGH (ref 5–17)
BUN SERPL-MCNC: 31 MG/DL — HIGH (ref 7–23)
CALCIUM SERPL-MCNC: 9.5 MG/DL — SIGNIFICANT CHANGE UP (ref 8.4–10.5)
CHLORIDE SERPL-SCNC: 104 MMOL/L — SIGNIFICANT CHANGE UP (ref 96–108)
CO2 SERPL-SCNC: 20 MMOL/L — LOW (ref 22–31)
CREAT SERPL-MCNC: 0.69 MG/DL — SIGNIFICANT CHANGE UP (ref 0.5–1.3)
CULTURE RESULTS: SIGNIFICANT CHANGE UP
CULTURE RESULTS: SIGNIFICANT CHANGE UP
GLUCOSE BLDC GLUCOMTR-MCNC: 182 MG/DL — HIGH (ref 70–99)
GLUCOSE BLDC GLUCOMTR-MCNC: 190 MG/DL — HIGH (ref 70–99)
GLUCOSE BLDC GLUCOMTR-MCNC: 202 MG/DL — HIGH (ref 70–99)
GLUCOSE BLDC GLUCOMTR-MCNC: 253 MG/DL — HIGH (ref 70–99)
GLUCOSE BLDC GLUCOMTR-MCNC: 255 MG/DL — HIGH (ref 70–99)
GLUCOSE SERPL-MCNC: 227 MG/DL — HIGH (ref 70–99)
HCT VFR BLD CALC: 40.1 % — SIGNIFICANT CHANGE UP (ref 34.5–45)
HGB BLD-MCNC: 12.9 G/DL — SIGNIFICANT CHANGE UP (ref 11.5–15.5)
MCHC RBC-ENTMCNC: 30.6 PG — SIGNIFICANT CHANGE UP (ref 27–34)
MCHC RBC-ENTMCNC: 32.2 GM/DL — SIGNIFICANT CHANGE UP (ref 32–36)
MCV RBC AUTO: 95 FL — SIGNIFICANT CHANGE UP (ref 80–100)
NRBC # BLD: 0 /100 WBCS — SIGNIFICANT CHANGE UP (ref 0–0)
PLATELET # BLD AUTO: 176 K/UL — SIGNIFICANT CHANGE UP (ref 150–400)
POTASSIUM SERPL-MCNC: 4.5 MMOL/L — SIGNIFICANT CHANGE UP (ref 3.5–5.3)
POTASSIUM SERPL-SCNC: 4.5 MMOL/L — SIGNIFICANT CHANGE UP (ref 3.5–5.3)
RBC # BLD: 4.22 M/UL — SIGNIFICANT CHANGE UP (ref 3.8–5.2)
RBC # FLD: 13.6 % — SIGNIFICANT CHANGE UP (ref 10.3–14.5)
SARS-COV-2 RNA SPEC QL NAA+PROBE: DETECTED
SODIUM SERPL-SCNC: 142 MMOL/L — SIGNIFICANT CHANGE UP (ref 135–145)
SPECIMEN SOURCE: SIGNIFICANT CHANGE UP
SPECIMEN SOURCE: SIGNIFICANT CHANGE UP
WBC # BLD: 6.17 K/UL — SIGNIFICANT CHANGE UP (ref 3.8–10.5)
WBC # FLD AUTO: 6.17 K/UL — SIGNIFICANT CHANGE UP (ref 3.8–10.5)

## 2021-03-30 PROCEDURE — 99233 SBSQ HOSP IP/OBS HIGH 50: CPT

## 2021-03-30 PROCEDURE — 99223 1ST HOSP IP/OBS HIGH 75: CPT | Mod: GC

## 2021-03-30 RX ORDER — INSULIN GLARGINE 100 [IU]/ML
15 INJECTION, SOLUTION SUBCUTANEOUS
Refills: 0 | Status: DISCONTINUED | OUTPATIENT
Start: 2021-03-30 | End: 2021-03-31

## 2021-03-30 RX ORDER — MORPHINE SULFATE 50 MG/1
1 CAPSULE, EXTENDED RELEASE ORAL
Refills: 0 | Status: DISCONTINUED | OUTPATIENT
Start: 2021-03-30 | End: 2021-03-31

## 2021-03-30 RX ORDER — SODIUM CHLORIDE 9 MG/ML
500 INJECTION, SOLUTION INTRAVENOUS
Refills: 0 | Status: DISCONTINUED | OUTPATIENT
Start: 2021-03-30 | End: 2021-03-30

## 2021-03-30 RX ORDER — MORPHINE SULFATE 50 MG/1
0.5 CAPSULE, EXTENDED RELEASE ORAL EVERY 4 HOURS
Refills: 0 | Status: DISCONTINUED | OUTPATIENT
Start: 2021-03-30 | End: 2021-03-31

## 2021-03-30 RX ORDER — INSULIN LISPRO 100/ML
6 VIAL (ML) SUBCUTANEOUS
Refills: 0 | Status: DISCONTINUED | OUTPATIENT
Start: 2021-03-30 | End: 2021-03-31

## 2021-03-30 RX ORDER — SODIUM CHLORIDE 9 MG/ML
1000 INJECTION INTRAMUSCULAR; INTRAVENOUS; SUBCUTANEOUS
Refills: 0 | Status: DISCONTINUED | OUTPATIENT
Start: 2021-03-30 | End: 2021-03-31

## 2021-03-30 RX ADMIN — Medication 6 MILLIGRAM(S): at 06:02

## 2021-03-30 RX ADMIN — MORPHINE SULFATE 0.5 MILLIGRAM(S): 50 CAPSULE, EXTENDED RELEASE ORAL at 19:07

## 2021-03-30 RX ADMIN — MIRTAZAPINE 7.5 MILLIGRAM(S): 45 TABLET, ORALLY DISINTEGRATING ORAL at 21:48

## 2021-03-30 RX ADMIN — Medication 3 UNIT(S): at 08:51

## 2021-03-30 RX ADMIN — Medication 1: at 18:36

## 2021-03-30 RX ADMIN — LOSARTAN POTASSIUM 50 MILLIGRAM(S): 100 TABLET, FILM COATED ORAL at 06:01

## 2021-03-30 RX ADMIN — MORPHINE SULFATE 0.5 MILLIGRAM(S): 50 CAPSULE, EXTENDED RELEASE ORAL at 06:00

## 2021-03-30 RX ADMIN — Medication 3: at 12:06

## 2021-03-30 RX ADMIN — POLYETHYLENE GLYCOL 3350 17 GRAM(S): 17 POWDER, FOR SOLUTION ORAL at 06:02

## 2021-03-30 RX ADMIN — MORPHINE SULFATE 0.5 MILLIGRAM(S): 50 CAPSULE, EXTENDED RELEASE ORAL at 21:49

## 2021-03-30 RX ADMIN — Medication 50 MICROGRAM(S): at 06:02

## 2021-03-30 RX ADMIN — Medication 1 TABLET(S): at 12:06

## 2021-03-30 RX ADMIN — Medication 3: at 08:51

## 2021-03-30 RX ADMIN — MORPHINE SULFATE 0.5 MILLIGRAM(S): 50 CAPSULE, EXTENDED RELEASE ORAL at 15:30

## 2021-03-30 RX ADMIN — ENOXAPARIN SODIUM 40 MILLIGRAM(S): 100 INJECTION SUBCUTANEOUS at 12:05

## 2021-03-30 RX ADMIN — Medication 10 MILLIGRAM(S): at 16:43

## 2021-03-30 RX ADMIN — MORPHINE SULFATE 0.5 MILLIGRAM(S): 50 CAPSULE, EXTENDED RELEASE ORAL at 18:37

## 2021-03-30 RX ADMIN — INSULIN GLARGINE 10 UNIT(S): 100 INJECTION, SOLUTION SUBCUTANEOUS at 08:53

## 2021-03-30 RX ADMIN — SENNA PLUS 2 TABLET(S): 8.6 TABLET ORAL at 21:49

## 2021-03-30 RX ADMIN — Medication 3 UNIT(S): at 12:06

## 2021-03-30 NOTE — CHART NOTE - NSCHARTNOTEFT_GEN_A_CORE
Nutrition Follow Up Note  Patient seen for: 1st Malnutrition Follow Up    Source:   [X] EMR  [X] Unit Nursing Staff    Diet, Regular:   Consistent Carbohydrate {Evening Snack} (CSTCHOSN)  Supplement Feeding Modality:  Oral  Ensure Clear Cans or Servings Per Day:  3       Frequency:  Daily (21 @ 14:10) [Active]      Unable to speak with patient as RN reported pt not appropriate for interview at this time. Per RN, pt with poor intake, refusing solids. Reported patient drank 80% Glucerna Shake and cranberry juice this AM, had three spoonfuls of soup at lunch, and that's it. RN reported trying to give pt egg but pt refused. Per discussion with RN, consider changing diet to full liquids. Pt may be more willing to consume more of her tray if drinkable vs food. Recommend SLP eval as maybe pt refusal of foods is related to difficulty swallowing? RN requested Glucerna at every meal. RN reported no nausea, vomiting, constipation, diarrhea or pt complaints of abdominal pain. No BM noted in chart. Per flowsheets. pt with constipation since admission. Of note, pt on bowel regimen.     Per discussion with ACP (Joan) this AM, pt noted refusing drinks with milky texture but was only accepting liquids (per RN later, pt accepting Glucerna). Discussed benefits of Ensure Clear even though pt with DM and on consistent carbohydrate diet. Encouraged switch and reminded ACP that pt blood glucose levels were high without her eating meaning she needed medical management of DM.  Also noted pt on Dexamethasone which could be contributing to in-house high blood glucose levels. Joan reported Endo following patient and will make adjustments as needed.       PO intake: POOR (<25%)  Source for PO intake: Unit nursing staff    Weights:   No new weights in chart    Pertinent Medications: IV Sodium Chloride, IV Dextrose, Lactated Ringers, Lantus, Insulin Lispro, Dulcolax, Miralax, Senna, Morphine, Dexamethasone, Synthroid, MVI     Pertinent Labs:  3/30: Na: 142, K: 4.5, BUN: 31H, Cr: 0.69, BH, A1C: 13.1 (3/26), Finger Sticks: 162/255 (3/28-3/30)    Skin: free of pressure injuries as per flowsheet   Edema: +1 bilateral ankles as per flowsheet    Estimated Needs:   [X] no change since previous assessment  Energy Needs (25-30 kcal/kg): 2903-5748 kcal/day  Protein Needs (1.0-1.2 g/kg): 63-76 g/day     Previous Nutrition Diagnosis: Moderate Malnutrition, acute  Nutrition Diagnosis is: ongoing- being managed with oral nutrition supplement and honoring patient food preferences       Recommendations/Interventions:   1) Encourage PO intake   2) Recommend consider changing diet to full liquid diet to promote intake  3) Recommend Glucerna 3x/day if pt accepting, if not recommend Ensure Clear x3/day  4) Monitor weights closely for worsening malnutrition   5) Recommend SLP eval for appropriate diet consistency  6) RD remains available for further questions     Monitoring and Evaluation:   Continue to monitor Nutritional intake, Tolerance to diet prescription, weights, labs, skin integrity    RD remains available upon request and will follow up per protocol  Janel Jama, Dietetic Intern, Pager # [phone] Nutrition Follow Up Note  Patient seen for: 1st Malnutrition Follow Up    Source:   [X] EMR  [X] Unit Nursing Staff    Diet, Regular:   Consistent Carbohydrate {Evening Snack} (CSTCHOSN)  Supplement Feeding Modality:  Oral  Ensure Clear Cans or Servings Per Day:  3       Frequency:  Daily (21 @ 14:10) [Active]      Unable to speak with patient as RN reported pt not appropriate for interview at this time. Per RN, pt with poor intake, refusing solids. Reported patient drank 80% Glucerna Shake and cranberry juice this AM, had three spoonfuls of soup at lunch, and that's it. RN reported trying to give pt egg but pt refused. Per discussion with RN, consider adding extra full liquids to tray to optimize intake. Pt may be more willing to consume more of her tray if drinkable vs food. RN requested Glucerna at every meal. RN reported no nausea, vomiting, constipation, diarrhea or pt complaints of abdominal pain. No BM noted in chart. Per flowsheets. pt with constipation since admission. Of note, pt on bowel regimen.     Per discussion with ACP this AM, pt noted refusing drinks with milky texture but was only accepting liquids (per RN later, pt accepting Glucerna). Discussed benefits of Ensure Clear even though pt with DM and on consistent carbohydrate diet. Encouraged switch and reminded ACP that pt blood glucose levels were high without her eating meaning she needed medical management of DM.  Also noted pt on Dexamethasone which could be contributing to in-house high blood glucose levels. Joan reported Endo following patient and will make adjustments as needed.       PO intake: POOR (<25%)  Source for PO intake: Unit nursing staff    Weights:   No new weights in chart    Pertinent Medications: IV Sodium Chloride, IV Dextrose, Lactated Ringers, Lantus, Insulin Lispro, Dulcolax, Miralax, Senna, Morphine, Dexamethasone, Synthroid, MVI     Pertinent Labs:  3/30: Na: 142, K: 4.5, BUN: 31H, Cr: 0.69, BH, A1C: 13.1 (3/26), Finger Sticks: 162/255 (3/28-3/30)    Skin: free of pressure injuries as per flowsheet   Edema: +1 bilateral ankles as per flowsheet    Estimated Needs:   [X] no change since previous assessment  Energy Needs (25-30 kcal/kg): 4351-4697 kcal/day  Protein Needs (1.0-1.2 g/kg): 63-76 g/day     Previous Nutrition Diagnosis: Moderate Malnutrition, acute  Nutrition Diagnosis is: ongoing- being managed with oral nutrition supplement and honoring patient food preferences       Recommendations/Interventions:   1) Encourage PO intake   2) Recommend adding additional full liquids to patient tray to optimize intake  3) Continue to provide feeding assistance  4) Recommend Glucerna 3x/day if pt accepting, if not recommend Ensure Clear x3/day  5) Monitor weights closely for worsening malnutrition   6) RD remains available for further questions     Monitoring and Evaluation:   Continue to monitor Nutritional intake, Tolerance to diet prescription, weights, labs, skin integrity    RD remains available upon request and will follow up per protocol  Janel Jama, Dietetic Intern, Pager # [phone]

## 2021-03-30 NOTE — PROGRESS NOTE ADULT - SUBJECTIVE AND OBJECTIVE BOX
Patient is a 89y old  Female who presents with a chief complaint of Poor appetite (29 Mar 2021 13:36)    Dr. Amberly Mazariegos   Division of Hospital Medicine  [facility]   Pager: [phone]     SUBJECTIVE / OVERNIGHT EVENTS: Patient seen and examined at bedside. States that she is ok, states she does not want to eat, is answering questions appropriately but appears weak. Denies any CP, SOB, abd pain and nv.     ROS:  All other review of systems negative    Allergies    No Known Allergies    Intolerances        MEDICATIONS  (STANDING):  dexAMETHasone     Tablet 6 milliGRAM(s) Oral daily  dextrose 40% Gel 15 Gram(s) Oral once  dextrose 5%. 1000 milliLiter(s) (50 mL/Hr) IV Continuous <Continuous>  dextrose 5%. 1000 milliLiter(s) (100 mL/Hr) IV Continuous <Continuous>  dextrose 50% Injectable 25 Gram(s) IV Push once  dextrose 50% Injectable 12.5 Gram(s) IV Push once  dextrose 50% Injectable 25 Gram(s) IV Push once  enoxaparin Injectable 40 milliGRAM(s) SubCutaneous daily  glucagon  Injectable 1 milliGRAM(s) IntraMuscular once  insulin glargine Injectable (LANTUS) 10 Unit(s) SubCutaneous every morning  insulin lispro (ADMELOG) corrective regimen sliding scale   SubCutaneous three times a day before meals  insulin lispro (ADMELOG) corrective regimen sliding scale   SubCutaneous at bedtime  insulin lispro Injectable (ADMELOG) 3 Unit(s) SubCutaneous three times a day before meals  lactated ringers. 1000 milliLiter(s) (100 mL/Hr) IV Continuous <Continuous>  levothyroxine 50 MICROGram(s) Oral daily  losartan 50 milliGRAM(s) Oral daily  mirtazapine 7.5 milliGRAM(s) Oral at bedtime  multivitamin/minerals 1 Tablet(s) Oral daily  polyethylene glycol 3350 17 Gram(s) Oral two times a day  senna 2 Tablet(s) Oral at bedtime    MEDICATIONS  (PRN):  acetaminophen   Tablet .. 650 milliGRAM(s) Oral every 6 hours PRN Temp greater or equal to 38C (100.4F), Mild Pain (1 - 3), Moderate Pain (4 - 6)  glycopyrrolate Injectable 0.4 milliGRAM(s) IV Push every 4 hours PRN Secretions.  LORazepam   Injectable 0.2 milliGRAM(s) IV Push every 4 hours PRN Anxiety, Agitation, or intractable respiratory distress  morphine  - Injectable 0.5 milliGRAM(s) IV Push every 2 hours PRN Labored Breating      Vital Signs Last 24 Hrs  T(C): 36.6 (30 Mar 2021 12:18), Max: 37.4 (29 Mar 2021 14:01)  T(F): 97.8 (30 Mar 2021 12:18), Max: 99.4 (29 Mar 2021 14:01)  HR: 70 (30 Mar 2021 12:18) (70 - 92)  BP: 139/80 (30 Mar 2021 12:18) (111/73 - 156/76)  BP(mean): --  RR: 18 (30 Mar 2021 12:18) (18 - 20)  SpO2: 94% (30 Mar 2021 12:18) (90% - 95%)  CAPILLARY BLOOD GLUCOSE      POCT Blood Glucose.: 255 mg/dL (30 Mar 2021 11:35)  POCT Blood Glucose.: 253 mg/dL (30 Mar 2021 08:31)  POCT Blood Glucose.: 162 mg/dL (29 Mar 2021 21:45)  POCT Blood Glucose.: 152 mg/dL (29 Mar 2021 17:41)    I&O's Summary    29 Mar 2021 07:01  -  30 Mar 2021 07:00  --------------------------------------------------------  IN: 580 mL / OUT: 0 mL / NET: 580 mL    30 Mar 2021 07:01  -  30 Mar 2021 13:15  --------------------------------------------------------  IN: 100 mL / OUT: 0 mL / NET: 100 mL        PHYSICAL EXAM:  GENERAL:elderly, + 2L NC  HEAD:  Atraumatic, Normocephalic  EYES: EOMI, PERRLA, conjunctiva and sclera clear  NECK: Supple, No JVD  CHEST/LUNG: Clear to auscultation bilaterally; No wheeze  HEART: Regular rate and rhythm; No murmurs, rubs, or gallops  ABDOMEN: Soft, Nontender, Nondistended; Bowel sounds present  EXTREMITIES:  2+ Peripheral Pulses, No clubbing, cyanosis, or edema  NEUROLOGY: AAOx2( self and place), non-focal  PSYCH: calm  SKIN: No rashes or lesions    LABS:                        12.9   6.17  )-----------( 176      ( 30 Mar 2021 07:17 )             40.1     03-30    142  |  104  |  31<H>  ----------------------------<  227<H>  4.5   |  20<L>  |  0.69    Ca    9.5      30 Mar 2021 07:08                RADIOLOGY & ADDITIONAL TESTS:    Care Discussed with Consultants/Other Providers: Medicine NP and Dr. Smith     Case Discussed with Son Max

## 2021-03-30 NOTE — CONSULT NOTE ADULT - SUBJECTIVE AND OBJECTIVE BOX
HPI: 89F with history of pancreatic adenocarcinoma (diagnosed Aug. 2020, but opting for comfort measures), HTN, T2DM, and hypothyroidism presents to [facility] with one week history of weakness, decrease PO intake, weight loss and constipation. Patient last admitted on Aug. 2020 when she was noted to have pancreatic mass and eventually found to have adenocarcinoma. C discussion with son at that time revealed DNR/DNI and home hospice given her functional status was good and treatment would likely involve chemotherapy and/or whipple.     When seen in ED patient reiterated those complaints. Initially, found to have glucose of 400, AG of 22 and BHB. She was given 2 L NS and 6 units lispro and now AG is closed. She was incidentally found to be COVID-19 positive. She denies cough or shortness of breath. MICU saw patient and deemed not a candidate. Medication reconciliation reviews patient is only on Glipizide 5mg BID.  Patient started on only Decadron 6mg daily for management of COVID PNA.   Endocrine consult requested for management of steroid induced hyperglycemia.       PAST MEDICAL & SURGICAL HISTORY:  HTN (hypertension)  Hypothyroidism  Pancreatic adenocarcinoma  DM2  H/O: hysterectomy    FAMILY HISTORY:    Social History:    Outpatient Medications:  dilTIAZem 240 mg/24 hours oral capsule, extended release: 1 cap(s) orally once a day (26 Mar 2021 11:10)  glipiZIDE 5 mg oral tablet: 1 tab(s) orally 2 times a day (26 Mar 2021 11:11)  Hyzaar 100 mg-25 mg oral tablet: 1 tab(s) orally once a day (26 Mar 2021 11:11)  ocular lubricant ophthalmic solution: 1 drop(s) to each affected eye 4 times a day, As Needed (26 Mar 2021 11:11)  Synthroid 50 mcg (0.05 mg) oral tablet: 1 tab(s) orally once a day (26 Mar 2021 11:10)  Vitamin D2 50,000 intl units (1.25 mg) oral capsule: 1 cap(s) orally once a week (26 Mar 2021 11:11)      MEDICATIONS  (STANDING):  dexAMETHasone     Tablet 6 milliGRAM(s) Oral daily  dextrose 40% Gel 15 Gram(s) Oral once  dextrose 5%. 1000 milliLiter(s) (50 mL/Hr) IV Continuous <Continuous>  dextrose 5%. 1000 milliLiter(s) (100 mL/Hr) IV Continuous <Continuous>  dextrose 50% Injectable 25 Gram(s) IV Push once  dextrose 50% Injectable 12.5 Gram(s) IV Push once  dextrose 50% Injectable 25 Gram(s) IV Push once  enoxaparin Injectable 40 milliGRAM(s) SubCutaneous daily  glucagon  Injectable 1 milliGRAM(s) IntraMuscular once  insulin glargine Injectable (LANTUS) 10 Unit(s) SubCutaneous every morning  insulin lispro (ADMELOG) corrective regimen sliding scale   SubCutaneous three times a day before meals  insulin lispro (ADMELOG) corrective regimen sliding scale   SubCutaneous at bedtime  insulin lispro Injectable (ADMELOG) 3 Unit(s) SubCutaneous three times a day before meals  lactated ringers. 1000 milliLiter(s) (100 mL/Hr) IV Continuous <Continuous>  levothyroxine 50 MICROGram(s) Oral daily  losartan 50 milliGRAM(s) Oral daily  mirtazapine 7.5 milliGRAM(s) Oral at bedtime  morphine  - Injectable 0.5 milliGRAM(s) IV Push every 4 hours  multivitamin/minerals 1 Tablet(s) Oral daily  polyethylene glycol 3350 17 Gram(s) Oral two times a day  senna 2 Tablet(s) Oral at bedtime  sodium chloride 0.9%. 1000 milliLiter(s) (50 mL/Hr) IV Continuous <Continuous>    MEDICATIONS  (PRN):  acetaminophen   Tablet .. 650 milliGRAM(s) Oral every 6 hours PRN Temp greater or equal to 38C (100.4F), Mild Pain (1 - 3), Moderate Pain (4 - 6)  bisacodyl Suppository 10 milliGRAM(s) Rectal daily PRN Constipation  glycopyrrolate Injectable 0.4 milliGRAM(s) IV Push every 4 hours PRN Secretions.  LORazepam   Injectable 0.2 milliGRAM(s) IV Push every 4 hours PRN Anxiety, Agitation, or intractable respiratory distress  morphine  - Injectable 1 milliGRAM(s) IV Push every 2 hours PRN Labored Breating    Allergies  No Known Allergies    Review of Systems:  UNABLE TO OBTAIN    PHYSICAL EXAM:  VITALS: T(C): 36.6 (03-30-21 @ 12:18)  T(F): 97.8 (03-30-21 @ 12:18), Max: 97.8 (03-29-21 @ 21:10)  HR: 70 (03-30-21 @ 12:18) (70 - 74)  BP: 139/80 (03-30-21 @ 12:18) (139/80 - 156/76)  RR:  (18 - 20)  SpO2:  (90% - 95%)  Wt(kg): 63.5kg   GENERAL: NAD, well-groomed, well-developed  EYES: No proptosis, anicteric  HEENT:  Atraumatic, Normocephalic, moist mucous membranes  THYROID: Normal size, no palpable nodules, nontender   RESPIRATORY: Clear to auscultation bilaterally; No rales, rhonchi, wheezing  CARDIOVASCULAR: Regular rate and rhythm; No murmurs; no peripheral edema  GI: Soft, nontender, non distended, normal bowel sounds  SKIN: Dry, intact, No rashes or lesions  MUSCULOSKELETAL: unable to assess   NEURO: sensation intact, extraocular movements intact, no tremor  PSYCH: Alert and alert however nonverbal     POCT Blood Glucose.: 255 mg/dL (03-30-21 @ 11:35) 3+3  POCT Blood Glucose.: 253 mg/dL (03-30-21 @ 08:31) 3+3, L10  POCT Blood Glucose.: 162 mg/dL (03-29-21 @ 21:45)   POCT Blood Glucose.: 152 mg/dL (03-29-21 @ 17:41) x  POCT Blood Glucose.: 180 mg/dL (03-29-21 @ 11:43) 1  POCT Blood Glucose.: 187 mg/dL (03-29-21 @ 07:59) 3+1  POCT Blood Glucose.: 167 mg/dL (03-28-21 @ 21:35)  POCT Blood Glucose.: 246 mg/dL (03-28-21 @ 16:54)  POCT Blood Glucose.: 149 mg/dL (03-28-21 @ 12:14)  POCT Blood Glucose.: 147 mg/dL (03-28-21 @ 07:54)  POCT Blood Glucose.: 116 mg/dL (03-27-21 @ 21:47)  POCT Blood Glucose.: 102 mg/dL (03-27-21 @ 17:00)                            12.9   6.17  )-----------( 176      ( 30 Mar 2021 07:17 )             40.1       03-30    142  |  104  |  31<H>  ----------------------------<  227<H>  4.5   |  20<L>  |  0.69    EGFR if : 86  EGFR if non : 74    Ca    9.5      03-30    Thyroid Function Tests:  03-26 @ 12:26 TSH 0.84 FreeT4 -- T3 -- Anti TPO -- Anti Thyroglobulin Ab -- TSI --                               HPI: 89F with history of pancreatic adenocarcinoma (diagnosed Aug. 2020, but opting for comfort measures), HTN, T2DM, and hypothyroidism presents to [facility] with one week history of weakness, decrease PO intake, weight loss and constipation. Patient last admitted on Aug. 2020 when she was noted to have pancreatic mass and eventually found to have adenocarcinoma. [facility] discussion with son at that time revealed DNR/DNI and home hospice given her functional status was good and treatment would likely involve chemotherapy and/or whipple.     When seen in ED patient reiterated those complaints. Initially, found to have glucose of 400, AG of 22 and BHB. She was given 2 L NS and 6 units lispro and now AG is closed. She was incidentally found to be COVID-19 positive. She denies cough or shortness of breath. MICU saw patient and deemed not a candidate. Medication reconciliation reviews patient is only on Glipizide 5mg BID.  Patient started on only Decadron 6mg daily for management of COVID PNA.   Endocrine consult requested for management of steroid induced hyperglycemia.   Patient being admitted to inpatient  hospice.     PAST MEDICAL & SURGICAL HISTORY:  HTN (hypertension)  Hypothyroidism  Pancreatic adenocarcinoma  DM2  H/O: hysterectomy    FAMILY HISTORY:  unknown    Social History:  unknown     Outpatient Medications:  dilTIAZem 240 mg/24 hours oral capsule, extended release: 1 cap(s) orally once a day (26 Mar 2021 11:10)  glipiZIDE 5 mg oral tablet: 1 tab(s) orally 2 times a day (26 Mar 2021 11:11)  Hyzaar 100 mg-25 mg oral tablet: 1 tab(s) orally once a day (26 Mar 2021 11:11)  ocular lubricant ophthalmic solution: 1 drop(s) to each affected eye 4 times a day, As Needed (26 Mar 2021 11:11)  Synthroid 50 mcg (0.05 mg) oral tablet: 1 tab(s) orally once a day (26 Mar 2021 11:10)  Vitamin D2 50,000 intl units (1.25 mg) oral capsule: 1 cap(s) orally once a week (26 Mar 2021 11:11)      MEDICATIONS  (STANDING):  dexAMETHasone     Tablet 6 milliGRAM(s) Oral daily  dextrose 40% Gel 15 Gram(s) Oral once  dextrose 5%. 1000 milliLiter(s) (50 mL/Hr) IV Continuous <Continuous>  dextrose 5%. 1000 milliLiter(s) (100 mL/Hr) IV Continuous <Continuous>  dextrose 50% Injectable 25 Gram(s) IV Push once  dextrose 50% Injectable 12.5 Gram(s) IV Push once  dextrose 50% Injectable 25 Gram(s) IV Push once  enoxaparin Injectable 40 milliGRAM(s) SubCutaneous daily  glucagon  Injectable 1 milliGRAM(s) IntraMuscular once  insulin glargine Injectable (LANTUS) 10 Unit(s) SubCutaneous every morning  insulin lispro (ADMELOG) corrective regimen sliding scale   SubCutaneous three times a day before meals  insulin lispro (ADMELOG) corrective regimen sliding scale   SubCutaneous at bedtime  insulin lispro Injectable (ADMELOG) 3 Unit(s) SubCutaneous three times a day before meals  lactated ringers. 1000 milliLiter(s) (100 mL/Hr) IV Continuous <Continuous>  levothyroxine 50 MICROGram(s) Oral daily  losartan 50 milliGRAM(s) Oral daily  mirtazapine 7.5 milliGRAM(s) Oral at bedtime  morphine  - Injectable 0.5 milliGRAM(s) IV Push every 4 hours  multivitamin/minerals 1 Tablet(s) Oral daily  polyethylene glycol 3350 17 Gram(s) Oral two times a day  senna 2 Tablet(s) Oral at bedtime  sodium chloride 0.9%. 1000 milliLiter(s) (50 mL/Hr) IV Continuous <Continuous>    MEDICATIONS  (PRN):  acetaminophen   Tablet .. 650 milliGRAM(s) Oral every 6 hours PRN Temp greater or equal to 38C (100.4F), Mild Pain (1 - 3), Moderate Pain (4 - 6)  bisacodyl Suppository 10 milliGRAM(s) Rectal daily PRN Constipation  glycopyrrolate Injectable 0.4 milliGRAM(s) IV Push every 4 hours PRN Secretions.  LORazepam   Injectable 0.2 milliGRAM(s) IV Push every 4 hours PRN Anxiety, Agitation, or intractable respiratory distress  morphine  - Injectable 1 milliGRAM(s) IV Push every 2 hours PRN Labored Breating    Allergies  No Known Allergies    Review of Systems:  UNABLE TO OBTAIN    PHYSICAL EXAM:  VITALS: T(C): 36.6 (03-30-21 @ 12:18)  T(F): 97.8 (03-30-21 @ 12:18), Max: 97.8 (03-29-21 @ 21:10)  HR: 70 (03-30-21 @ 12:18) (70 - 74)  BP: 139/80 (03-30-21 @ 12:18) (139/80 - 156/76)  RR:  (18 - 20)  SpO2:  (90% - 95%)  Wt(kg): 63.5kg   GENERAL: NAD, well-groomed, well-developed  EYES: No proptosis, anicteric  HEENT:  Atraumatic, Normocephalic, moist mucous membranes  THYROID: Normal size, no palpable nodules, nontender   RESPIRATORY: Clear to auscultation bilaterally; No rales, rhonchi, wheezing  CARDIOVASCULAR: Regular rate and rhythm; No murmurs; no peripheral edema  GI: Soft, nontender, non distended, normal bowel sounds  SKIN: Dry, intact, No rashes or lesions  MUSCULOSKELETAL: unable to assess   NEURO: sensation intact, extraocular movements intact, no tremor  PSYCH: Alert and alert however nonverbal     POCT Blood Glucose.: 255 mg/dL (03-30-21 @ 11:35) 3+3  POCT Blood Glucose.: 253 mg/dL (03-30-21 @ 08:31) 3+3, L10  POCT Blood Glucose.: 162 mg/dL (03-29-21 @ 21:45)   POCT Blood Glucose.: 152 mg/dL (03-29-21 @ 17:41) x  POCT Blood Glucose.: 180 mg/dL (03-29-21 @ 11:43) 1  POCT Blood Glucose.: 187 mg/dL (03-29-21 @ 07:59) 3+1  POCT Blood Glucose.: 167 mg/dL (03-28-21 @ 21:35)  POCT Blood Glucose.: 246 mg/dL (03-28-21 @ 16:54)  POCT Blood Glucose.: 149 mg/dL (03-28-21 @ 12:14)  POCT Blood Glucose.: 147 mg/dL (03-28-21 @ 07:54)  POCT Blood Glucose.: 116 mg/dL (03-27-21 @ 21:47)  POCT Blood Glucose.: 102 mg/dL (03-27-21 @ 17:00)                            12.9   6.17  )-----------( 176      ( 30 Mar 2021 07:17 )             40.1       03-30    142  |  104  |  31<H>  ----------------------------<  227<H>  4.5   |  20<L>  |  0.69    EGFR if : 86  EGFR if non : 74    Ca    9.5      03-30    Thyroid Function Tests:  03-26 @ 12:26 TSH 0.84 FreeT4 -- T3 -- Anti TPO -- Anti Thyroglobulin Ab -- TSI --                               HPI: 89F with history of pancreatic adenocarcinoma (diagnosed Aug. 2020, but opting for comfort measures), HTN, T2DM, and hypothyroidism presents to [facility] with one week history of weakness, decrease PO intake, weight loss and constipation. Patient last admitted on Aug. 2020 when she was noted to have pancreatic mass and eventually found to have adenocarcinoma. [facility] discussion with son at that time revealed DNR/DNI and home hospice given her functional status was good and treatment would likely involve chemotherapy and/or whipple.     When seen in ED patient reiterated those complaints. Initially, found to have glucose of 400, AG of 22 and BHB. She was given 2 L NS and 6 units lispro and now AG is closed. She was incidentally found to be COVID-19 positive. She denies cough or shortness of breath. MICU saw patient and deemed not a candidate. Medication reconciliation reviews patient is only on Glipizide 5mg BID.  Patient started on only Decadron 6mg daily for management of COVID PNA.   Endocrine consult requested for management of steroid induced hyperglycemia.   Patient being admitted to inpatient  hospice.     PAST MEDICAL & SURGICAL HISTORY:  HTN (hypertension)  Hypothyroidism  Pancreatic adenocarcinoma  DM2  H/O: hysterectomy    FAMILY HISTORY:  unknown    Social History:  unknown     Outpatient Medications:  dilTIAZem 240 mg/24 hours oral capsule, extended release: 1 cap(s) orally once a day (26 Mar 2021 11:10)  glipiZIDE 5 mg oral tablet: 1 tab(s) orally 2 times a day (26 Mar 2021 11:11)  Hyzaar 100 mg-25 mg oral tablet: 1 tab(s) orally once a day (26 Mar 2021 11:11)  ocular lubricant ophthalmic solution: 1 drop(s) to each affected eye 4 times a day, As Needed (26 Mar 2021 11:11)  Synthroid 50 mcg (0.05 mg) oral tablet: 1 tab(s) orally once a day (26 Mar 2021 11:10)  Vitamin D2 50,000 intl units (1.25 mg) oral capsule: 1 cap(s) orally once a week (26 Mar 2021 11:11)      MEDICATIONS  (STANDING):  dexAMETHasone     Tablet 6 milliGRAM(s) Oral daily  dextrose 40% Gel 15 Gram(s) Oral once  dextrose 5%. 1000 milliLiter(s) (50 mL/Hr) IV Continuous <Continuous>  dextrose 5%. 1000 milliLiter(s) (100 mL/Hr) IV Continuous <Continuous>  dextrose 50% Injectable 25 Gram(s) IV Push once  dextrose 50% Injectable 12.5 Gram(s) IV Push once  dextrose 50% Injectable 25 Gram(s) IV Push once  enoxaparin Injectable 40 milliGRAM(s) SubCutaneous daily  glucagon  Injectable 1 milliGRAM(s) IntraMuscular once  insulin glargine Injectable (LANTUS) 10 Unit(s) SubCutaneous every morning  insulin lispro (ADMELOG) corrective regimen sliding scale   SubCutaneous three times a day before meals  insulin lispro (ADMELOG) corrective regimen sliding scale   SubCutaneous at bedtime  insulin lispro Injectable (ADMELOG) 3 Unit(s) SubCutaneous three times a day before meals  lactated ringers. 1000 milliLiter(s) (100 mL/Hr) IV Continuous <Continuous>  levothyroxine 50 MICROGram(s) Oral daily  losartan 50 milliGRAM(s) Oral daily  mirtazapine 7.5 milliGRAM(s) Oral at bedtime  morphine  - Injectable 0.5 milliGRAM(s) IV Push every 4 hours  multivitamin/minerals 1 Tablet(s) Oral daily  polyethylene glycol 3350 17 Gram(s) Oral two times a day  senna 2 Tablet(s) Oral at bedtime  sodium chloride 0.9%. 1000 milliLiter(s) (50 mL/Hr) IV Continuous <Continuous>    MEDICATIONS  (PRN):  acetaminophen   Tablet .. 650 milliGRAM(s) Oral every 6 hours PRN Temp greater or equal to 38C (100.4F), Mild Pain (1 - 3), Moderate Pain (4 - 6)  bisacodyl Suppository 10 milliGRAM(s) Rectal daily PRN Constipation  glycopyrrolate Injectable 0.4 milliGRAM(s) IV Push every 4 hours PRN Secretions.  LORazepam   Injectable 0.2 milliGRAM(s) IV Push every 4 hours PRN Anxiety, Agitation, or intractable respiratory distress  morphine  - Injectable 1 milliGRAM(s) IV Push every 2 hours PRN Labored Breating    Allergies  No Known Allergies    Review of Systems:  UNABLE TO OBTAIN    PHYSICAL EXAM:  VITALS: T(C): 36.6 (03-30-21 @ 12:18)  T(F): 97.8 (03-30-21 @ 12:18), Max: 97.8 (03-29-21 @ 21:10)  HR: 70 (03-30-21 @ 12:18) (70 - 74)  BP: 139/80 (03-30-21 @ 12:18) (139/80 - 156/76)  RR:  (18 - 20)  SpO2:  (90% - 95%)  Wt(kg): 63.5kg   GENERAL: NAD, well-groomed, well-developed  EYES: No proptosis, anicteric  HEENT:  Atraumatic, Normocephalic, moist mucous membranes  GI: Soft, nontender, non distended, normal bowel sounds  SKIN: Dry, intact, No rashes or lesions on feet b/l  MUSCULOSKELETAL: unable to assess   NEURO: extraocular movements intact, no tremor  PSYCH: Alert and awake however nonverbal     POCT Blood Glucose.: 255 mg/dL (03-30-21 @ 11:35) 3+3  POCT Blood Glucose.: 253 mg/dL (03-30-21 @ 08:31) 3+3, L10  POCT Blood Glucose.: 162 mg/dL (03-29-21 @ 21:45)   POCT Blood Glucose.: 152 mg/dL (03-29-21 @ 17:41) x  POCT Blood Glucose.: 180 mg/dL (03-29-21 @ 11:43) 1  POCT Blood Glucose.: 187 mg/dL (03-29-21 @ 07:59) 3+1  POCT Blood Glucose.: 167 mg/dL (03-28-21 @ 21:35)  POCT Blood Glucose.: 246 mg/dL (03-28-21 @ 16:54)  POCT Blood Glucose.: 149 mg/dL (03-28-21 @ 12:14)  POCT Blood Glucose.: 147 mg/dL (03-28-21 @ 07:54)  POCT Blood Glucose.: 116 mg/dL (03-27-21 @ 21:47)  POCT Blood Glucose.: 102 mg/dL (03-27-21 @ 17:00)                            12.9   6.17  )-----------( 176      ( 30 Mar 2021 07:17 )             40.1       03-30    142  |  104  |  31<H>  ----------------------------<  227<H>  4.5   |  20<L>  |  0.69    EGFR if : 86  EGFR if non : 74    Ca    9.5      03-30    Thyroid Function Tests:  03-26 @ 12:26 TSH 0.84

## 2021-03-30 NOTE — CONSULT NOTE ADULT - ATTENDING COMMENTS
Attempted to speak to patient in [place] citizen but she would not respond until I asked her to speak to me. She initially repeated what I said and then she answered her birthday for me in English. Based on her age and her hospice status, no need for tight glycemic control, goal -220s. Would defer to hospice to determine if POC glucose needs to be done - if not, can stop insulin as well. Will sign off at this time.

## 2021-03-30 NOTE — CONSULT NOTE ADULT - ASSESSMENT
89F with history of pancreatic adenocarcinoma (diagnosed Aug. 2020, but opting for comfort measures), HTN, T2DM, and hypothyroidism presents to [facility] with one week history of weakness, decrease PO intake, weight loss and constipation. In ED initially, found to have glucose of 400, AG of 22 and BHB. She was given 2 L NS and 6 units lispro and now AG is closed. She was incidentally found to be COVID-19 positive. Patient started on only Decadron 6mg daily for management of COVID PNA.   Endocrine consult requested for management of steroid induced hyperglycemia.       Steroid induced hyperglycemia   A1c 13.1  FS premeal and qhs  FS goal 100-180  recommend Lantus 15 units q8am   Recommend Admelog 6units TIDac, hold if NPO   Recommend mod correctional scale premeal and qhs   Please notify endocrine team if changes to steroid regimen    Discharge recommendation pending hospice or home?  Patient can follow up with PCP     Hypothyroidism   TSH 0.84  Recommend continue synthroid 50mcg qam     HTN   Continue Losartan       To be discussed with Dr Catrer Osorio-Shyann Agarwal MD  Endocrine Fellow   Pager  on weekdays 9am- 5pm   Please call  after hours and on weekends 89F with history of pancreatic adenocarcinoma (diagnosed Aug. 2020, but opting for comfort measures), HTN, T2DM, and hypothyroidism presents to [facility] with one week history of weakness, decrease PO intake, weight loss and constipation. In ED initially, found to have glucose of 400, AG of 22 and BHB. She was given 2 L NS and 6 units lispro and now AG is closed. She was incidentally found to be COVID-19 positive. Patient started on only Decadron 6mg daily for management of COVID PNA.   Endocrine consult requested for management of steroid induced hyperglycemia.       Steroid induced hyperglycemia   A1c 13.1  FS premeal and qhs  FS goal 100-180  Recommend Lantus 15 units q8am   Recommend Admelog 6units TIDac, hold if NPO   Recommend mod correctional scale premeal and qhs   Please notify endocrine team if changes to steroid regimen    Discharge recommendation pending hospice or home?  Patient can follow up with PCP     Hypothyroidism   TSH 0.84  Recommend continue synthroid 50mcg qam     HTN   Continue Losartan       Discussed with Dr Machado  Unable to reach primary team     As patient going to inpatient hospice will sign off    Julee Agarwal MD  Endocrine Fellow   Pager  on weekdays 9am- 5pm   Please call  after hours and on weekends 89F with history of pancreatic adenocarcinoma (diagnosed Aug. 2020, but opting for comfort measures), HTN, T2DM, and hypothyroidism presents to [facility] with one week history of weakness, decrease PO intake, weight loss and constipation. In ED initially, found to have glucose of 400, AG of 22 and BHB. She was given 2 L NS and 6 units lispro and now AG is closed. She was incidentally found to be COVID-19 positive. Patient started on only Decadron 6mg daily for management of COVID PNA.   Endocrine consult requested for management of steroid induced hyperglycemia.       Steroid induced hyperglycemia   A1c 13.1  FS premeal and qhs  FS goal 100-180  Recommend Lantus 15 units q8am   Recommend Admelog 6units TIDac, hold if NPO   Recommend mod correctional scale premeal and qhs   Please notify endocrine team if changes to steroid regimen  If steroid discontinued 24 hours after last dose would put patient on low correctional q6h if not eating    Discharge recommendation pending hospice or home?  Patient can follow up with PCP     Hypothyroidism   TSH 0.84  Recommend continue synthroid 50mcg qam     HTN   Continue Losartan       Discussed with Dr Macahdo  Discussed with primary team    As patient going to inpatient hospice will sign off    Julee Agarwal MD  Endocrine Fellow   Pager  on weekdays 9am- 5pm   Please call  after hours and on weekends 89F with history of pancreatic adenocarcinoma (diagnosed Aug. 2020, but opting for comfort measures), HTN, T2DM, and hypothyroidism presents to [facility] with one week history of weakness, decrease PO intake, weight loss and constipation. In ED initially, found to have glucose of 400, AG of 22 and BHB. She was given 2 L NS and 6 units lispro and now AG is closed. She was incidentally found to be COVID-19 positive. Patient started on only Decadron 6mg daily for management of COVID PNA.   Endocrine consult requested for management of steroid induced hyperglycemia.       T2D uncontrolled/Steroid induced hyperglycemia   A1c 13.1  FS premeal and qhs  FS goal 100-180  Recommend Lantus 15 units q8am   Recommend Admelog 6units TIDac, hold if NPO   Recommend mod correctional scale premeal and qhs   Please notify endocrine team if changes to steroid regimen  If steroid discontinued 24 hours after last dose would put patient on low correctional q6h if not eating      Hypothyroidism   TSH 0.84  Recommend continue synthroid 50mcg qam     HTN   Continue Losartan       Discussed with Dr Machado  Discussed with primary team    As patient going to inpatient hospice will sign off    Julee Agarwal MD  Endocrine Fellow   Pager  on weekdays 9am- 5pm   Please call  after hours and on weekends

## 2021-03-30 NOTE — PROGRESS NOTE ADULT - PROBLEM SELECTOR PLAN 6
-C/w standing Miralax & Senna    Dispo: likely d/c to [facility] vs inpatient hospice.     d/w medicine PAULA Franco and YUSUF Bolden

## 2021-03-30 NOTE — PROGRESS NOTE ADULT - SUBJECTIVE AND OBJECTIVE BOX
Siena Werner ID #[id number] Angelica   HPI:  89F with PMHx of pancreatic adenocarcinoma (diagnosed Aug. 2020, but opting for comfort measures), HTN, T2DM, and hypothyroidism presents to [facility] with one week history of weakness, decrease PO intake, weight loss and constipation. Patient last admitted on Aug. 2020 when she was noted to have pancreatic mass and eventually found to have adenocarcinoma. [facility] discussion with son at that time revealed DNR/DNI and home hospice given her functional status was good and treatment would likely involve chemotherapy and/or whipple.     When seen in ED patient reiterated those complaints. Initially, found to have glucose of 400, AG of 22 and BHB. She was given 2 L NS and 6 units lispro and now AG is closed. She was incidentally found to be COVID-19 positive. She denies cough or shortness of breath. MICU saw patient and deemed not a candidate. Medication reconciliation reviews patient is only on Glipizide 4 mg daily.   (26 Mar 2021 04:52)    3/26 The patient was seen and examined. She was having labored breathing and c/o dyspnea that she believed had slightly improved. She denied pain. She was slightly confused. She was also c/o anorexia  and severe debility.   3/30 The patient was constantly moaning and indicating she was short of breath. She also appeared slightly labored breathing and tachypneic. As per nursing and as per her primary attending, the patient's appetite has been poor and eating only 10% of his meals.  She is now sleeping must of the time. She was only able to answer very simple questions and when complex questions were asked, she was just repeating what was questioned. As per nursing with decreased urinary output.     PERTINENT PM/SXH:   Pancreatic adenocarcinoma    Hypothyroidism    HTN (hypertension)      H/O: hysterectomy      FAMILY HISTORY:  No pertinent family history in first degree relatives      ITEMS NOT CHECKED ARE NOT PRESENT    SOCIAL HISTORY:   Significant other/partner[ ]  Children[ ]  Buddhist/Spirituality:  Substance hx:  [ ]   Tobacco hx:  [ ]   Alcohol hx: [ ]   Home Opioid hx:  [ ] I-Stop Reference No:  Living Situation: [x ]Home  [ ]Long term care  [ ]Rehab [ ]Other    ADVANCE DIRECTIVES:    DNR  MOLST  [ ]  Living Will  [ ]   DECISION MAKER(s):  [x ] Health Care Proxy(s)  [ ] Surrogate(s)  [ ] Guardian           Name(s): Phone Number(s): Anthony Arndt [phone]    BASELINE (I)ADL(s) (prior to admission):  [place]: [ ]Total  [ ] Moderate [x ]Dependent    Allergies    No Known Allergies    Intolerances    MEDICATIONS  (STANDING):  dexAMETHasone     Tablet 6 milliGRAM(s) Oral daily  dextrose 40% Gel 15 Gram(s) Oral once  dextrose 5%. 1000 milliLiter(s) (50 mL/Hr) IV Continuous <Continuous>  dextrose 5%. 1000 milliLiter(s) (100 mL/Hr) IV Continuous <Continuous>  dextrose 50% Injectable 25 Gram(s) IV Push once  dextrose 50% Injectable 12.5 Gram(s) IV Push once  dextrose 50% Injectable 25 Gram(s) IV Push once  enoxaparin Injectable 40 milliGRAM(s) SubCutaneous daily  glucagon  Injectable 1 milliGRAM(s) IntraMuscular once  insulin glargine Injectable (LANTUS) 10 Unit(s) SubCutaneous every morning  insulin lispro (ADMELOG) corrective regimen sliding scale   SubCutaneous three times a day before meals  insulin lispro (ADMELOG) corrective regimen sliding scale   SubCutaneous at bedtime  insulin lispro Injectable (ADMELOG) 3 Unit(s) SubCutaneous three times a day before meals  lactated ringers. 1000 milliLiter(s) (100 mL/Hr) IV Continuous <Continuous>  levothyroxine 50 MICROGram(s) Oral daily  losartan 50 milliGRAM(s) Oral daily  mirtazapine 7.5 milliGRAM(s) Oral at bedtime  morphine  - Injectable 0.5 milliGRAM(s) IV Push every 4 hours  multivitamin/minerals 1 Tablet(s) Oral daily  polyethylene glycol 3350 17 Gram(s) Oral two times a day  senna 2 Tablet(s) Oral at bedtime  sodium chloride 0.9%. 1000 milliLiter(s) (50 mL/Hr) IV Continuous <Continuous>    MEDICATIONS  (PRN):  acetaminophen   Tablet .. 650 milliGRAM(s) Oral every 6 hours PRN Temp greater or equal to 38C (100.4F), Mild Pain (1 - 3), Moderate Pain (4 - 6)  glycopyrrolate Injectable 0.4 milliGRAM(s) IV Push every 4 hours PRN Secretions.  LORazepam   Injectable 0.2 milliGRAM(s) IV Push every 4 hours PRN Anxiety, Agitation, or intractable respiratory distress  morphine  - Injectable 0.5 milliGRAM(s) IV Push every 2 hours PRN Labored Breating      PRESENT SYMPTOMS: [x ]Unable to obtain due to poor mentation   Source if other than patient:  [ ]Family   [ ]Team     Pain: [ ]yes [ x]no  QOL impact -   Location -                    Aggravating factors -  Quality -  Radiation -  Timing-  Severity (0-10 scale):  Minimal acceptable level (0-10 scale):     CPOT:    https://www.[facility].org/getattachment/[email or web address]/Critical-Care-Pain-Observation-Tool-(CPOT)      PAIN AD Score: 3    http://geriatrictoolkit.[facility]/cog/painad.pdf (press ctrl +  left click to view)    Dyspnea:                           [ ]Mild [ ]Moderate [ ]Severe. Was not able to indicated its severity   Anxiety:                             [ ]Mild [ ]Moderate [ ]Severe  Fatigue:                             [ ]Mild [ ]Moderate [ ]Severe  Nausea:                             [ ]Mild [ ]Moderate [ ]Severe  Loss of appetite:              [ ]Mild [ ]Moderate [ ]Severe  Constipation:                    [ ]Mild [ ]Moderate [ ]Severe    Other Symptoms:  [ ]All other review of systems negative     Palliative Performance Status Version 2:   20-30      %    http://npcrc.org/files/news/palliative_performance_scale_ppsv2.pdf  PHYSICAL EXAM:  Vital Signs Last 24 Hrs  T(C): 36.6 (30 Mar 2021 12:18), Max: 36.6 (29 Mar 2021 21:10)  T(F): 97.8 (30 Mar 2021 12:18), Max: 97.8 (29 Mar 2021 21:10)  HR: 70 (30 Mar 2021 12:18) (70 - 74)  BP: 139/80 (30 Mar 2021 12:18) (139/80 - 156/76)  BP(mean): --  RR: 26   SpO2: 94% (30 Mar 2021 12:18) (90% - 95%)    GENERAL:  [x ]Alert  [x ]Oriented x 3  [ ]Lethargic  [ ]Cachexia  [ ]Unarousable  [x ]Verbal  [ ]Non-Verbal  Behavioral:   [ ] Anxiety  [ ] Delirium [ ] Agitation [x ] Other: Mild confusion   HEENT:  [x ]Normal   [ ]Dry mouth   [ ]ET Tube/Trach  [ ]Oral lesions  PULMONARY:   [ ]Clear [ ]Tachypnea  [ ]Audible excessive secretions   [ ]Rhonchi        [ ]Right [ ]Left [ ]Bilateral  [x ]Crackles        [ ]Right [x ]Left [ ]Bilateral  [ ]Wheezing     [ ]Right [ ]Left [ ]Bilateral  [x ]Diminished breath sounds [ ]right [ ]left [x ]bilateral    CARDIOVASCULAR:    [x ]Regular [ ]Irregular [ ]Tachy  [ ]Raghu [ ]Murmur [ ]Other  GASTROINTESTINAL:  [ ]Soft  [x ]Distended   [ ]+BS  [x ]Non tender [ ]Tender  [ ]PEG [ ]OGT/ NGT  Last BM: No BM Since admission   GENITOURINARY:  [ ]Normal [x ] Incontinent   [ ]Oliguria/Anuria   [ ]Hui   MUSCULOSKELETAL:   [ ]Normal   [x ]Weakness  [ ]Bed/Wheelchair bound [ ]Edema  NEUROLOGIC:   [ ]No focal deficits  [ ]Cognitive impairment  [ ]Dysphagia [ ]Dysarthria [ ]Paresis [x ]Other: Confused.   SKIN:   [x ]Normal    [ ]Rash  [ ]Pressure ulcer(s)       Present on admission [ ]y [ ]n    CRITICAL CARE:  [ ] Shock Present  [ ]Septic [ ]Cardiogenic [ ]Neurologic [ ]Hypovolemic  [ ]  Vasopressors [ ]  Inotropes   [ ]Respiratory failure present [ ]Mechanical ventilation [ ]Non-invasive ventilatory support [ ]High flow    [ ]Acute  [ ]Chronic [ ]Hypoxic  [ ]Hypercarbic [ ]Other  [ ]Other organ failure     LABS:                   RADIOLOGY & ADDITIONAL STUDIES:                          12.9   6.17  )-----------( 176      ( 30 Mar 2021 07:17 )             40.1   03-30    142  |  104  |  31<H>  ----------------------------<  227<H>  4.5   |  20<L>  |  0.69    Ca    9.5      30 Mar 2021 07:08      c< from: CT Abdomen and Pelvis w/ IV Cont (03.25.21 @ 19:14) >  EXAM:  CT ABDOMEN AND PELVIS IC                            PROCEDURE DATE:  03/25/2021  IMPRESSION:  Marked enlargement of pancreatic mass since 08/24/2020 consistent with carcinoma. Possible superimposed pancreatitis.  Left lower lobe opacity which may be infectious                  ANGELICA QUINTANILLA MD; Attending Radiologist  This document has been electronically signed. Mar 25 2021  7:29PMPANCREAS: Marked enlargement of previously demonstrated mass of the pancreatic body now measuring 4.9 x 3.1 cm with downstream pancreatic atrophy and dilatation of the pancreatic duct. Adjacent fat stranding which may represent superimposed pancreatitis. The mass abuts the lesser curvature of the stomach.    < end of copied text >    < from: Xray Chest 1 View- PORTABLE-Urgent (03.25.21 @ 17:55) >    FINDINGS:    Right glenohumeral joint arthrosis. The cardiomediastinal silhouette is unremarkable. The lungs are clear. There is no pleural effusion or pneumothorax.    IMPRESSION:    Clear lungs.  EXAM:  XR CHEST PORTABLE URGENT 1V                            PROCEDURE DATE:  03/25/2021      < end of copied text >  PROTEIN CALORIE MALNUTRITION PRESENT: [ ]mild [ ]moderate [ ]severe [ ]underweight [ ]morbid obesity  https://www.andeal.org/vault/2440/web/files/ONC/Table_Clinical%20Characteristics%20to%20Document%20Malnutrition-White%20JV%20et%20al%202012.pdf    Height (cm): 154.9 (03-25-21 @ 15:47), 154.94 (08-27-20 @ 12:56)  Weight (kg): 63.5 (03-25-21 @ 15:47), 65.5 (08-27-20 @ 12:56)  BMI (kg/m2): 26.5 (03-25-21 @ 15:47), 27.3 (08-27-20 @ 12:56)    [ ]PPSV2 < or = to 30% [ ]significant weight loss  [ ]poor nutritional intake  [ ]anasarca     Albumin, Serum: 3.8 g/dL (03-25-21 @ 17:30)   [ ]Artificial Nutrition      REFERRALS:   [ ]Chaplaincy  [ ]Hospice  [ ]Child Life  [ ]Social Work  [ ]Case management [ ]Holistic Therapy     Goals of Care Document:  Siena Werner ID #[id number] Angelica   HPI:  89F with PMHx of pancreatic adenocarcinoma (diagnosed Aug. 2020, but opting for comfort measures), HTN, T2DM, and hypothyroidism presents to [facility] with one week history of weakness, decrease PO intake, weight loss and constipation. Patient last admitted on Aug. 2020 when she was noted to have pancreatic mass and eventually found to have adenocarcinoma. [facility] discussion with son at that time revealed DNR/DNI and home hospice given her functional status was good and treatment would likely involve chemotherapy and/or whipple.     When seen in ED patient reiterated those complaints. Initially, found to have glucose of 400, AG of 22 and BHB. She was given 2 L NS and 6 units lispro and now AG is closed. She was incidentally found to be COVID-19 positive. She denies cough or shortness of breath. MICU saw patient and deemed not a candidate. Medication reconciliation reviews patient is only on Glipizide 4 mg daily.   (26 Mar 2021 04:52)    3/26 The patient was seen and examined. She was having labored breathing and c/o dyspnea that she believed had slightly improved. She denied pain. She was slightly confused. She was also c/o anorexia  and severe debility.   3/30 The patient was constantly moaning and indicating she was short of breath. She also appeared slightly labored breathing and tachypneic. As per nursing and as per her primary attending, the patient's appetite has been poor and eating only 10% of his meals.  She is now sleeping must of the time. She was only able to answer very simple questions and when complex questions were asked, she was just repeating what was questioned. As per nursing with decreased urinary output.     PERTINENT PM/SXH:   Pancreatic adenocarcinoma    Hypothyroidism    HTN (hypertension)      H/O: hysterectomy      FAMILY HISTORY:  No pertinent family history in first degree relatives      ITEMS NOT CHECKED ARE NOT PRESENT    SOCIAL HISTORY:   Significant other/partner[ ]  Children[ ]  Pentecostalism/Spirituality:  Substance hx:  [ ]   Tobacco hx:  [ ]   Alcohol hx: [ ]   Home Opioid hx:  [ ] I-Stop Reference No:  Living Situation: [x ]Home  [ ]Long term care  [ ]Rehab [ ]Other    ADVANCE DIRECTIVES:    DNR  MOLST  [ ]  Living Will  [ ]   DECISION MAKER(s):  [x ] Health Care Proxy(s)  [ ] Surrogate(s)  [ ] Guardian           Name(s): Phone Number(s): Anthony Arndt [phone]. HCP form available under alpha during her prior admission.     BASELINE (I)ADL(s) (prior to admission):  [place]: [ ]Total  [ ] Moderate [x ]Dependent    Allergies    No Known Allergies    Intolerances    MEDICATIONS  (STANDING):  dexAMETHasone     Tablet 6 milliGRAM(s) Oral daily  dextrose 40% Gel 15 Gram(s) Oral once  dextrose 5%. 1000 milliLiter(s) (50 mL/Hr) IV Continuous <Continuous>  dextrose 5%. 1000 milliLiter(s) (100 mL/Hr) IV Continuous <Continuous>  dextrose 50% Injectable 25 Gram(s) IV Push once  dextrose 50% Injectable 12.5 Gram(s) IV Push once  dextrose 50% Injectable 25 Gram(s) IV Push once  enoxaparin Injectable 40 milliGRAM(s) SubCutaneous daily  glucagon  Injectable 1 milliGRAM(s) IntraMuscular once  insulin glargine Injectable (LANTUS) 10 Unit(s) SubCutaneous every morning  insulin lispro (ADMELOG) corrective regimen sliding scale   SubCutaneous three times a day before meals  insulin lispro (ADMELOG) corrective regimen sliding scale   SubCutaneous at bedtime  insulin lispro Injectable (ADMELOG) 3 Unit(s) SubCutaneous three times a day before meals  lactated ringers. 1000 milliLiter(s) (100 mL/Hr) IV Continuous <Continuous>  levothyroxine 50 MICROGram(s) Oral daily  losartan 50 milliGRAM(s) Oral daily  mirtazapine 7.5 milliGRAM(s) Oral at bedtime  morphine  - Injectable 0.5 milliGRAM(s) IV Push every 4 hours  multivitamin/minerals 1 Tablet(s) Oral daily  polyethylene glycol 3350 17 Gram(s) Oral two times a day  senna 2 Tablet(s) Oral at bedtime  sodium chloride 0.9%. 1000 milliLiter(s) (50 mL/Hr) IV Continuous <Continuous>    MEDICATIONS  (PRN):  acetaminophen   Tablet .. 650 milliGRAM(s) Oral every 6 hours PRN Temp greater or equal to 38C (100.4F), Mild Pain (1 - 3), Moderate Pain (4 - 6)  glycopyrrolate Injectable 0.4 milliGRAM(s) IV Push every 4 hours PRN Secretions.  LORazepam   Injectable 0.2 milliGRAM(s) IV Push every 4 hours PRN Anxiety, Agitation, or intractable respiratory distress  morphine  - Injectable 0.5 milliGRAM(s) IV Push every 2 hours PRN Labored Breating      PRESENT SYMPTOMS: [x ]Unable to obtain due to poor mentation   Source if other than patient:  [ ]Family   [ ]Team     Pain: [ ]yes [ x]no  QOL impact -   Location -                    Aggravating factors -  Quality -  Radiation -  Timing-  Severity (0-10 scale):  Minimal acceptable level (0-10 scale):     CPOT:    https://www.[facility].org/getattachment/[email or web address]/Critical-Care-Pain-Observation-Tool-(CPOT)      PAIN AD Score: 3    http://geriatrictoolkit.[facility]/cog/painad.pdf (press ctrl +  left click to view)    Dyspnea:                           [ ]Mild [ ]Moderate [ ]Severe. Was not able to indicated its severity   Anxiety:                             [ ]Mild [ ]Moderate [ ]Severe  Fatigue:                             [ ]Mild [ ]Moderate [ ]Severe  Nausea:                             [ ]Mild [ ]Moderate [ ]Severe  Loss of appetite:              [ ]Mild [ ]Moderate [ ]Severe  Constipation:                    [ ]Mild [ ]Moderate [ ]Severe    Other Symptoms:  [ ]All other review of systems negative     Palliative Performance Status Version 2:   20-30      %    http://npcrc.org/files/news/palliative_performance_scale_ppsv2.pdf  PHYSICAL EXAM:  Vital Signs Last 24 Hrs  T(C): 36.6 (30 Mar 2021 12:18), Max: 36.6 (29 Mar 2021 21:10)  T(F): 97.8 (30 Mar 2021 12:18), Max: 97.8 (29 Mar 2021 21:10)  HR: 70 (30 Mar 2021 12:18) (70 - 74)  BP: 139/80 (30 Mar 2021 12:18) (139/80 - 156/76)  BP(mean): --  RR: 26   SpO2: 94% (30 Mar 2021 12:18) (90% - 95%)    GENERAL:  [x ]Alert  [x ]Oriented x 3  [ ]Lethargic  [ ]Cachexia  [ ]Unarousable  [x ]Verbal  [ ]Non-Verbal  Behavioral:   [ ] Anxiety  [ ] Delirium [ ] Agitation [x ] Other: Mild confusion   HEENT:  [x ]Normal   [ ]Dry mouth   [ ]ET Tube/Trach  [ ]Oral lesions  PULMONARY:   [ ]Clear [ ]Tachypnea  [ ]Audible excessive secretions   [ ]Rhonchi        [ ]Right [ ]Left [ ]Bilateral  [x ]Crackles        [ ]Right [x ]Left [ ]Bilateral  [ ]Wheezing     [ ]Right [ ]Left [ ]Bilateral  [x ]Diminished breath sounds [ ]right [ ]left [x ]bilateral    CARDIOVASCULAR:    [x ]Regular [ ]Irregular [ ]Tachy  [ ]Raghu [ ]Murmur [ ]Other  GASTROINTESTINAL:  [ ]Soft  [x ]Distended   [ ]+BS  [x ]Non tender [ ]Tender  [ ]PEG [ ]OGT/ NGT  Last BM: No BM Since admission   GENITOURINARY:  [ ]Normal [x ] Incontinent   [ ]Oliguria/Anuria   [ ]Hui   MUSCULOSKELETAL:   [ ]Normal   [x ]Weakness  [ ]Bed/Wheelchair bound [ ]Edema  NEUROLOGIC:   [ ]No focal deficits  [ ]Cognitive impairment  [ ]Dysphagia [ ]Dysarthria [ ]Paresis [x ]Other: Confused.   SKIN:   [x ]Normal    [ ]Rash  [ ]Pressure ulcer(s)       Present on admission [ ]y [ ]n    CRITICAL CARE:  [ ] Shock Present  [ ]Septic [ ]Cardiogenic [ ]Neurologic [ ]Hypovolemic  [ ]  Vasopressors [ ]  Inotropes   [ ]Respiratory failure present [ ]Mechanical ventilation [ ]Non-invasive ventilatory support [ ]High flow    [ ]Acute  [ ]Chronic [ ]Hypoxic  [ ]Hypercarbic [ ]Other  [ ]Other organ failure     LABS:                   RADIOLOGY & ADDITIONAL STUDIES:                          12.9   6.17  )-----------( 176      ( 30 Mar 2021 07:17 )             40.1   03-30    142  |  104  |  31<H>  ----------------------------<  227<H>  4.5   |  20<L>  |  0.69    Ca    9.5      30 Mar 2021 07:08      c< from: CT Abdomen and Pelvis w/ IV Cont (03.25.21 @ 19:14) >  EXAM:  CT ABDOMEN AND PELVIS IC                            PROCEDURE DATE:  03/25/2021  IMPRESSION:  Marked enlargement of pancreatic mass since 08/24/2020 consistent with carcinoma. Possible superimposed pancreatitis.  Left lower lobe opacity which may be infectious                  ANGELICA QUINTANILLA MD; Attending Radiologist  This document has been electronically signed. Mar 25 2021  7:29PMPANCREAS: Marked enlargement of previously demonstrated mass of the pancreatic body now measuring 4.9 x 3.1 cm with downstream pancreatic atrophy and dilatation of the pancreatic duct. Adjacent fat stranding which may represent superimposed pancreatitis. The mass abuts the lesser curvature of the stomach.    < end of copied text >    < from: Xray Chest 1 View- PORTABLE-Urgent (03.25.21 @ 17:55) >    FINDINGS:    Right glenohumeral joint arthrosis. The cardiomediastinal silhouette is unremarkable. The lungs are clear. There is no pleural effusion or pneumothorax.    IMPRESSION:    Clear lungs.  EXAM:  XR CHEST PORTABLE URGENT 1V                            PROCEDURE DATE:  03/25/2021      < end of copied text >  PROTEIN CALORIE MALNUTRITION PRESENT: [ ]mild [ ]moderate [ ]severe [ ]underweight [ ]morbid obesity  https://www.andeal.org/vault/2440/web/files/ONC/Table_Clinical%20Characteristics%20to%20Document%20Malnutrition-White%20JV%20et%20al%202012.pdf    Height (cm): 154.9 (03-25-21 @ 15:47), 154.94 (08-27-20 @ 12:56)  Weight (kg): 63.5 (03-25-21 @ 15:47), 65.5 (08-27-20 @ 12:56)  BMI (kg/m2): 26.5 (03-25-21 @ 15:47), 27.3 (08-27-20 @ 12:56)    [ ]PPSV2 < or = to 30% [ ]significant weight loss  [ ]poor nutritional intake  [ ]anasarca     Albumin, Serum: 3.8 g/dL (03-25-21 @ 17:30)   [ ]Artificial Nutrition      REFERRALS:   [ ]Chaplaincy  [ ]Hospice  [ ]Child Life  [ ]Social Work  [ ]Case management [ ]Holistic Therapy     Goals of Care Document:

## 2021-03-30 NOTE — PROGRESS NOTE ADULT - PROBLEM SELECTOR PLAN 4
d/w the patient's son, Hair, that will fowar d/w the patient's son, Hair, that gave verbalized understanding about the patient's advanced illness (Met pancreatic d/w the patient's HCP, Hair, that gave verbalized understanding about the patient's advanced illness (Met pancreatic) over acute medical issues (COVID-19, Poor oral intake) and poor prognosis. He indicated GOC are towards preventing and treating any distressful symptoms. He agree with scheduled and PRN opioids. He also agree with inpatient hospice. On Senna and Miralax ATC. Will add Dulcolax PRN x 1 dose today.

## 2021-03-30 NOTE — PROGRESS NOTE ADULT - PROBLEM SELECTOR PLAN 6
d/w the medical director at the hospice [facility] who approved the patient for inpatient hospice. d/w the hospice nurse liaison that soledad be reaching out to the patient's son in order to get consents so [facility] can [facility].         Chu Smith MD   Geriatrics and Palliative Care (GAP) Consult Service    of Geriatric and Palliative Medicine  [facility]      Please page the following number for clinical matters between the hours of 9 am and 5 pm from Monday through Friday : [phone]    After 5pm and on weekends, please see the contact information below:    In the event of newly developing, evolving, or worsening symptoms, please contact the Palliative Medicine team via pager (if the patient is at [facility] #[phone] or if the patient is at [facility] #[phone]) The Geriatric and Palliative Medicine service has coverage 24 hours a day/ 7 days a week to provide medical recommendations regarding symptom management needs via telephone

## 2021-03-30 NOTE — CONSULT NOTE ADULT - PROBLEM SELECTOR PROBLEM 1
Type 2 diabetes mellitus with hyperglycemia, without long-term current use of insulin
Labored breathing

## 2021-03-30 NOTE — PROGRESS NOTE ADULT - PROBLEM SELECTOR PLAN 5
d/w the medical director at the hospice [facility] who approved the patient for inpatient hospice. d/w the hospice nurse liaison that soledad be reaching out to the patient's son in order to get consents so she can be transferred [facility].         Chu Smith MD   Geriatrics and Palliative Care (GAP) Consult Service    of Geriatric and Palliative Medicine  [facility]      Please page the following number for clinical matters between the hours of 9 am and 5 pm from Monday through Friday : [phone]    After 5pm and on weekends, please see the contact information below:    In the event of newly developing, evolving, or worsening symptoms, please contact the Palliative Medicine team via pager (if the patient is at [facility] #[phone] or if the patient is at [facility] #[phone]) The Geriatric and Palliative Medicine service has coverage 24 hours a day/ 7 days a week to provide medical recommendations regarding symptom management needs via telephone d/w the patient's HCP, Hair, that gave verbalized understanding about the patient's advanced illness (Met pancreatic) over acute medical issues (COVID-19, Poor oral intake) and poor prognosis. He indicated GOC are towards preventing and treating any distressful symptoms. He agree with scheduled and PRN opioids. He also agree with inpatient hospice.

## 2021-03-31 ENCOUNTER — TRANSCRIPTION ENCOUNTER (OUTPATIENT)
Age: 86
End: 2021-03-31

## 2021-03-31 VITALS
SYSTOLIC BLOOD PRESSURE: 156 MMHG | HEART RATE: 70 BPM | OXYGEN SATURATION: 93 % | RESPIRATION RATE: 20 BRPM | DIASTOLIC BLOOD PRESSURE: 71 MMHG | TEMPERATURE: 98 F

## 2021-03-31 LAB
GLUCOSE BLDC GLUCOMTR-MCNC: 180 MG/DL — HIGH (ref 70–99)
GLUCOSE BLDC GLUCOMTR-MCNC: 352 MG/DL — HIGH (ref 70–99)

## 2021-03-31 PROCEDURE — 85027 COMPLETE CBC AUTOMATED: CPT

## 2021-03-31 PROCEDURE — 85610 PROTHROMBIN TIME: CPT

## 2021-03-31 PROCEDURE — 84132 ASSAY OF SERUM POTASSIUM: CPT

## 2021-03-31 PROCEDURE — 85379 FIBRIN DEGRADATION QUANT: CPT

## 2021-03-31 PROCEDURE — 85014 HEMATOCRIT: CPT

## 2021-03-31 PROCEDURE — 84443 ASSAY THYROID STIM HORMONE: CPT

## 2021-03-31 PROCEDURE — 82330 ASSAY OF CALCIUM: CPT

## 2021-03-31 PROCEDURE — 87086 URINE CULTURE/COLONY COUNT: CPT

## 2021-03-31 PROCEDURE — 80048 BASIC METABOLIC PNL TOTAL CA: CPT

## 2021-03-31 PROCEDURE — 97162 PT EVAL MOD COMPLEX 30 MIN: CPT

## 2021-03-31 PROCEDURE — 82565 ASSAY OF CREATININE: CPT

## 2021-03-31 PROCEDURE — 82962 GLUCOSE BLOOD TEST: CPT

## 2021-03-31 PROCEDURE — U0003: CPT

## 2021-03-31 PROCEDURE — 85018 HEMOGLOBIN: CPT

## 2021-03-31 PROCEDURE — 84484 ASSAY OF TROPONIN QUANT: CPT

## 2021-03-31 PROCEDURE — 83690 ASSAY OF LIPASE: CPT

## 2021-03-31 PROCEDURE — 86769 SARS-COV-2 COVID-19 ANTIBODY: CPT

## 2021-03-31 PROCEDURE — 83605 ASSAY OF LACTIC ACID: CPT

## 2021-03-31 PROCEDURE — 81001 URINALYSIS AUTO W/SCOPE: CPT

## 2021-03-31 PROCEDURE — 82947 ASSAY GLUCOSE BLOOD QUANT: CPT

## 2021-03-31 PROCEDURE — 84295 ASSAY OF SERUM SODIUM: CPT

## 2021-03-31 PROCEDURE — 87040 BLOOD CULTURE FOR BACTERIA: CPT

## 2021-03-31 PROCEDURE — 74177 CT ABD & PELVIS W/CONTRAST: CPT

## 2021-03-31 PROCEDURE — 71045 X-RAY EXAM CHEST 1 VIEW: CPT

## 2021-03-31 PROCEDURE — 82803 BLOOD GASES ANY COMBINATION: CPT

## 2021-03-31 PROCEDURE — 99239 HOSP IP/OBS DSCHRG MGMT >30: CPT

## 2021-03-31 PROCEDURE — 82728 ASSAY OF FERRITIN: CPT

## 2021-03-31 PROCEDURE — 97110 THERAPEUTIC EXERCISES: CPT

## 2021-03-31 PROCEDURE — 99233 SBSQ HOSP IP/OBS HIGH 50: CPT

## 2021-03-31 PROCEDURE — 83036 HEMOGLOBIN GLYCOSYLATED A1C: CPT

## 2021-03-31 PROCEDURE — 97530 THERAPEUTIC ACTIVITIES: CPT

## 2021-03-31 PROCEDURE — 86140 C-REACTIVE PROTEIN: CPT

## 2021-03-31 PROCEDURE — 99291 CRITICAL CARE FIRST HOUR: CPT | Mod: 25

## 2021-03-31 PROCEDURE — 82010 KETONE BODYS QUAN: CPT

## 2021-03-31 PROCEDURE — 82435 ASSAY OF BLOOD CHLORIDE: CPT

## 2021-03-31 PROCEDURE — 80053 COMPREHEN METABOLIC PANEL: CPT

## 2021-03-31 PROCEDURE — U0005: CPT

## 2021-03-31 PROCEDURE — 84145 PROCALCITONIN (PCT): CPT

## 2021-03-31 PROCEDURE — 85025 COMPLETE CBC W/AUTO DIFF WBC: CPT

## 2021-03-31 PROCEDURE — 85730 THROMBOPLASTIN TIME PARTIAL: CPT

## 2021-03-31 RX ORDER — ROBINUL 0.2 MG/ML
0.4 INJECTION INTRAMUSCULAR; INTRAVENOUS
Qty: 0 | Refills: 0 | DISCHARGE
Start: 2021-03-31

## 2021-03-31 RX ORDER — INSULIN GLARGINE 100 [IU]/ML
15 INJECTION, SOLUTION SUBCUTANEOUS
Qty: 0 | Refills: 0 | DISCHARGE
Start: 2021-03-31

## 2021-03-31 RX ORDER — POLYETHYLENE GLYCOL 3350 17 G/17G
17 POWDER, FOR SOLUTION ORAL
Qty: 0 | Refills: 0 | DISCHARGE
Start: 2021-03-31

## 2021-03-31 RX ORDER — LEVOTHYROXINE SODIUM 125 MCG
1 TABLET ORAL
Qty: 0 | Refills: 0 | DISCHARGE

## 2021-03-31 RX ORDER — MIRTAZAPINE 45 MG/1
1 TABLET, ORALLY DISINTEGRATING ORAL
Qty: 0 | Refills: 0 | DISCHARGE
Start: 2021-03-31

## 2021-03-31 RX ORDER — MULTIVIT-MIN/FERROUS GLUCONATE 9 MG/15 ML
1 LIQUID (ML) ORAL
Qty: 0 | Refills: 0 | DISCHARGE
Start: 2021-03-31

## 2021-03-31 RX ORDER — LOSARTAN POTASSIUM 100 MG/1
1 TABLET, FILM COATED ORAL
Qty: 0 | Refills: 0 | DISCHARGE
Start: 2021-03-31

## 2021-03-31 RX ORDER — MORPHINE SULFATE 50 MG/1
0.5 CAPSULE, EXTENDED RELEASE ORAL
Qty: 0 | Refills: 0 | DISCHARGE

## 2021-03-31 RX ORDER — ENOXAPARIN SODIUM 100 MG/ML
40 INJECTION SUBCUTANEOUS
Qty: 0 | Refills: 0 | DISCHARGE
End: 2021-04-14

## 2021-03-31 RX ORDER — INSULIN LISPRO 100/ML
3 VIAL (ML) SUBCUTANEOUS
Qty: 0 | Refills: 0 | DISCHARGE
Start: 2021-03-31

## 2021-03-31 RX ORDER — LEVOTHYROXINE SODIUM 125 MCG
1 TABLET ORAL
Qty: 0 | Refills: 0 | DISCHARGE
Start: 2021-03-31

## 2021-03-31 RX ORDER — DEXAMETHASONE 0.5 MG/5ML
1 ELIXIR ORAL
Qty: 0 | Refills: 0 | DISCHARGE
Start: 2021-03-31 | End: 2021-04-08

## 2021-03-31 RX ORDER — LOSARTAN/HYDROCHLOROTHIAZIDE 100MG-25MG
1 TABLET ORAL
Qty: 0 | Refills: 0 | DISCHARGE

## 2021-03-31 RX ORDER — INSULIN LISPRO 100/ML
1 VIAL (ML) SUBCUTANEOUS
Qty: 0 | Refills: 0 | DISCHARGE
Start: 2021-03-31

## 2021-03-31 RX ORDER — MORPHINE SULFATE 50 MG/1
1 CAPSULE, EXTENDED RELEASE ORAL
Qty: 0 | Refills: 0 | DISCHARGE

## 2021-03-31 RX ORDER — DILTIAZEM HCL 120 MG
1 CAPSULE, EXT RELEASE 24 HR ORAL
Qty: 0 | Refills: 0 | DISCHARGE

## 2021-03-31 RX ADMIN — MORPHINE SULFATE 0.5 MILLIGRAM(S): 50 CAPSULE, EXTENDED RELEASE ORAL at 06:12

## 2021-03-31 RX ADMIN — Medication 1 TABLET(S): at 13:05

## 2021-03-31 RX ADMIN — MORPHINE SULFATE 0.5 MILLIGRAM(S): 50 CAPSULE, EXTENDED RELEASE ORAL at 10:27

## 2021-03-31 RX ADMIN — MORPHINE SULFATE 0.5 MILLIGRAM(S): 50 CAPSULE, EXTENDED RELEASE ORAL at 13:18

## 2021-03-31 RX ADMIN — MORPHINE SULFATE 0.5 MILLIGRAM(S): 50 CAPSULE, EXTENDED RELEASE ORAL at 01:52

## 2021-03-31 RX ADMIN — INSULIN GLARGINE 15 UNIT(S): 100 INJECTION, SOLUTION SUBCUTANEOUS at 08:44

## 2021-03-31 RX ADMIN — LOSARTAN POTASSIUM 50 MILLIGRAM(S): 100 TABLET, FILM COATED ORAL at 08:45

## 2021-03-31 RX ADMIN — ENOXAPARIN SODIUM 40 MILLIGRAM(S): 100 INJECTION SUBCUTANEOUS at 13:05

## 2021-03-31 RX ADMIN — Medication 50 MICROGRAM(S): at 08:45

## 2021-03-31 RX ADMIN — Medication 6 MILLIGRAM(S): at 08:45

## 2021-03-31 RX ADMIN — Medication 6 UNIT(S): at 13:06

## 2021-03-31 NOTE — PROGRESS NOTE ADULT - PROBLEM SELECTOR PLAN 5
Yesterday, I d/w the patient's HCP, Hair, that gave verbalized understanding about the patient's advanced illness (Met pancreatic) over acute medical issues (COVID-19, Poor oral intake) and poor prognosis. He indicated GOC are towards preventing and treating any distressful symptoms. He agree with scheduled and PRN opioids. He also agree with inpatient hospice.

## 2021-03-31 NOTE — PROGRESS NOTE ADULT - PROBLEM SELECTOR PLAN 6
Planning for inpatient hospice today.   Updated the patient's HCP about the patient's condition and plan of care.         Chu Smith MD   Geriatrics and Palliative Care (GAP) Consult Service    of Geriatric and Palliative Medicine  [facility]      Please page the following number for clinical matters between the hours of 9 am and 5 pm from Monday through Friday : [phone]    After 5pm and on weekends, please see the contact information below:    In the event of newly developing, evolving, or worsening symptoms, please contact the Palliative Medicine team via pager (if the patient is at [facility] #[phone] or if the patient is at [facility] #[phone]) The Geriatric and Palliative Medicine service has coverage 24 hours a day/ 7 days a week to provide medical recommendations regarding symptom management needs via telephone

## 2021-03-31 NOTE — DISCHARGE NOTE PROVIDER - NSDCMRMEDTOKEN_GEN_ALL_CORE_FT
bisacodyl 10 mg rectal suppository: 1 suppository(ies) rectal once a day, As needed, Constipation  glycopyrrolate 0.2 mg/mL injectable solution: 0.4 milligram(s) injectable every 4 hours, As Needed  insulin glargine: 15 unit(s) subcutaneous once a day at 0800 in the am.   insulin lispro 100 units/mL injectable solution: 3 unit(s) subcutaneous 3 times a day (before meals)  levothyroxine 50 mcg (0.05 mg) oral tablet: 1 tab(s) orally once a day  losartan 50 mg oral tablet: 1 tab(s) orally once a day  mirtazapine 7.5 mg oral tablet: 1 tab(s) orally once a day (at bedtime)  morphine 0.5 mg/mL injectable solution: 0.5 milligram(s) injectable every 4 hours, As Needed for Resp &lt;12.  morphine 1 mg/mL injectable solution: 1 milligram(s) injectable every 6 hours, As Needed for labored breathing.   Multiple Vitamins with Minerals oral tablet: 1 tab(s) orally once a day  ocular lubricant ophthalmic solution: 1 drop(s) to each affected eye 4 times a day, As Needed  polyethylene glycol 3350 oral powder for reconstitution: 17 gram(s) orally 2 times a day  Vitamin D2 50,000 intl units (1.25 mg) oral capsule: 1 cap(s) orally once a week   bisacodyl 10 mg rectal suppository: 1 suppository(ies) rectal once a day, As needed, Constipation  dexamethasone 6 mg oral tablet: 1 tab(s) orally once a day  glycopyrrolate 0.2 mg/mL injectable solution: 0.4 milligram(s) injectable every 4 hours, As Needed  insulin glargine: 15 unit(s) subcutaneous once a day at 0800 in the am.   insulin lispro 100 units/mL injectable solution:  injectable   1unit   -200  2unit    FS  201-250  3 unit   FS   251-300  4 unit   FS    301-350  5 unit   FS    351-400  insulin lispro 100 units/mL injectable solution: 3 unit(s) subcutaneous 3 times a day (before meals)  levothyroxine 50 mcg (0.05 mg) oral tablet: 1 tab(s) orally once a day  losartan 50 mg oral tablet: 1 tab(s) orally once a day  Lovenox 40 mg/0.4 mL injectable solution: 40 milligram(s) injectable once a day  mirtazapine 7.5 mg oral tablet: 1 tab(s) orally once a day (at bedtime)  morphine 0.5 mg/mL injectable solution: 0.5 milligram(s) injectable every 4 hours, As Needed for Resp &lt;12.  morphine 1 mg/mL injectable solution: 1 milligram(s) injectable every 6 hours, As Needed for labored breathing.   Multiple Vitamins with Minerals oral tablet: 1 tab(s) orally once a day  ocular lubricant ophthalmic solution: 1 drop(s) to each affected eye 4 times a day, As Needed  polyethylene glycol 3350 oral powder for reconstitution: 17 gram(s) orally 2 times a day  Vitamin D2 50,000 intl units (1.25 mg) oral capsule: 1 cap(s) orally once a week

## 2021-03-31 NOTE — DISCHARGE NOTE NURSING/CASE MANAGEMENT/SOCIAL WORK - PATIENT PORTAL LINK FT
You can access the FollowMyHealth Patient Portal offered by [facility] by registering at the following website: [email or web address]. By joining [facility]’s FollowMyHealth portal, you will also be able to view your health information using other applications (apps) compatible with our system.

## 2021-03-31 NOTE — PROGRESS NOTE ADULT - PROBLEM SELECTOR PLAN 6
-C/w standing Miralax & Senna    Dispo: Patient is medically stable for d/c to hospice INN today    d/w medicine PAULA Franco and YUSUF Bolden -C/w standing Miralax & Senna    Dispo: Patient is medically stable for d/c to hospice INN today, time spent 40 min     d/w medicine PAULA Franco and YUSUF Bolden

## 2021-03-31 NOTE — PROGRESS NOTE ADULT - PROBLEM SELECTOR PROBLEM 1
Labored breathing
Diabetic ketoacidosis without coma associated with type 2 diabetes mellitus
Diabetic ketoacidosis without coma associated with type 2 diabetes mellitus
Labored breathing
Diabetic ketoacidosis without coma associated with type 2 diabetes mellitus

## 2021-03-31 NOTE — DISCHARGE NOTE PROVIDER - NSDCCPCAREPLAN_GEN_ALL_CORE_FT
PRINCIPAL DISCHARGE DIAGNOSIS  Diagnosis: Diabetic ketoacidosis without coma associated with type 2 diabetes mellitus  Assessment and Plan of Treatment: HgA1C this admission.  Make sure you get your HgA1c checked every three months.  If you take oral diabetes medications, check your blood glucose two times a day.  If you take insulin, check your blood glucose before meals and at bedtime.  It's important not to skip any meals.  Keep a log of your blood glucose results and always take it with you to your doctor appointments.  Keep a list of your current medications including injectables and over the counter medications and bring this medication list with you to all your doctor appointments.  If you have not seen your ophthalmologist this year call for appointment.  Check your feet daily for redness, sores, or openings. Do not self treat. If no improvement in two days call your primary care physician for an appointment.  Low blood sugar (hypoglycemia) is a blood sugar below 70mg/dl. Check your blood sugar if you feel signs/symptoms of hypoglycemia. If your blood sugar is below 70 take 15 grams of carbohydrates (ex 4 oz of apple juice, 3-4 glucose tablets, or 4-6 oz of regular soda) wait 15 minutes and repeat blood sugar to make sure it comes up above 70.  If your blood sugar is above 70 and you are due for a meal, have a meal.  If you are not due for a meal have a snack.  This snack helps keeps your blood sugar at a safe range.        SECONDARY DISCHARGE DIAGNOSES  Diagnosis: COVID-19  Assessment and Plan of Treatment: COVID-19  You tested positive for COVID 19.  You no longer require hospitalization.  Please restrict activities outside of your home except for getting medical care.  Do not go to work, school, or public areas.  Avoid using public transportation, ride-sharing, or taxis.  Separate yourself from other people and animals in your home.  Call ahead before visiting your doctor.  Wear a facemask when you are around other people. Cover your cough and sneezes.  Clean your hands often.  Avoid sharing personal household items.  Clean all frequently touched surfaces daily.      Diagnosis: Palliative care encounter  Assessment and Plan of Treatment: Palliative care encounter  Transition to Hospice at [facility].

## 2021-03-31 NOTE — PROGRESS NOTE ADULT - PROBLEM SELECTOR PROBLEM 6
Palliative care encounter
Constipation
Palliative care encounter

## 2021-03-31 NOTE — PROGRESS NOTE ADULT - SUBJECTIVE AND OBJECTIVE BOX
Siena Werner ID #[id number] Angelica   HPI:  89F with PMHx of pancreatic adenocarcinoma (diagnosed Aug. 2020, but opting for comfort measures), HTN, T2DM, and hypothyroidism presents to [facility] with one week history of weakness, decrease PO intake, weight loss and constipation. Patient last admitted on Aug. 2020 when she was noted to have pancreatic mass and eventually found to have adenocarcinoma. GOC discussion with son at that time revealed DNR/DNI and home hospice given her functional status was good and treatment would likely involve chemotherapy and/or whipple.     When seen in ED patient reiterated those complaints. Initially, found to have glucose of 400, AG of 22 and BHB. She was given 2 L NS and 6 units lispro and now AG is closed. She was incidentally found to be COVID-19 positive. She denies cough or shortness of breath. MICU saw patient and deemed not a candidate. Medication reconciliation reviews patient is only on Glipizide 4 mg daily.   (26 Mar 2021 04:52)    3/26 The patient was seen and examined. She was having labored breathing and c/o dyspnea that she believed had slightly improved. She denied pain. She was slightly confused. She was also c/o anorexia  and severe debility.   3/30 The patient was constantly moaning and indicating she was short of breath. She also appeared slightly labored breathing and tachypneic. As per nursing and as per her primary attending, the patient's appetite has been poor and eating only 10% of his meals.  She is now sleeping must of the time. She was only able to answer very simple questions and when complex questions were asked, she was just repeating what was questioned. As per nursing with decreased urinary output.   3/31 As per nursing with Morphine 0.5 mg IV moaning and labored breathing have improved.     PERTINENT PM/SXH:   Pancreatic adenocarcinoma    Hypothyroidism    HTN (hypertension)      H/O: hysterectomy      FAMILY HISTORY:  No pertinent family history in first degree relatives      ITEMS NOT CHECKED ARE NOT PRESENT    SOCIAL HISTORY:   Significant other/partner[ ]  Children[ ]  Episcopal/Spirituality:  Substance hx:  [ ]   Tobacco hx:  [ ]   Alcohol hx: [ ]   Home Opioid hx:  [ ] I-Stop Reference No:  Living Situation: [x ]Home  [ ]Long term care  [ ]Rehab [ ]Other    ADVANCE DIRECTIVES:    DNR  MOLST  [ ]  Living Will  [ ]   DECISION MAKER(s):  [x ] Health Care Proxy(s)  [ ] Surrogate(s)  [ ] Guardian           Name(s): Phone Number(s): Anthony Arndt [phone]. HCP form available under alpha during her prior admission.     BASELINE (I)ADL(s) (prior to admission):  [place]: [ ]Total  [ ] Moderate [x ]Dependent    Allergies    No Known Allergies    Intolerances    MEDICATIONS  (STANDING):  dexAMETHasone     Tablet 6 milliGRAM(s) Oral daily  dextrose 40% Gel 15 Gram(s) Oral once  dextrose 5%. 1000 milliLiter(s) (50 mL/Hr) IV Continuous <Continuous>  dextrose 5%. 1000 milliLiter(s) (100 mL/Hr) IV Continuous <Continuous>  dextrose 50% Injectable 25 Gram(s) IV Push once  dextrose 50% Injectable 12.5 Gram(s) IV Push once  dextrose 50% Injectable 25 Gram(s) IV Push once  enoxaparin Injectable 40 milliGRAM(s) SubCutaneous daily  glucagon  Injectable 1 milliGRAM(s) IntraMuscular once  insulin glargine Injectable (LANTUS) 15 Unit(s) SubCutaneous <User Schedule>  insulin lispro (ADMELOG) corrective regimen sliding scale   SubCutaneous three times a day before meals  insulin lispro (ADMELOG) corrective regimen sliding scale   SubCutaneous at bedtime  insulin lispro Injectable (ADMELOG) 6 Unit(s) SubCutaneous three times a day before meals  lactated ringers. 1000 milliLiter(s) (100 mL/Hr) IV Continuous <Continuous>  levothyroxine 50 MICROGram(s) Oral daily  losartan 50 milliGRAM(s) Oral daily  mirtazapine 7.5 milliGRAM(s) Oral at bedtime  morphine  - Injectable 0.5 milliGRAM(s) IV Push every 4 hours  multivitamin/minerals 1 Tablet(s) Oral daily  polyethylene glycol 3350 17 Gram(s) Oral two times a day  senna 2 Tablet(s) Oral at bedtime  sodium chloride 0.9%. 1000 milliLiter(s) (50 mL/Hr) IV Continuous <Continuous>    MEDICATIONS  (PRN):  acetaminophen   Tablet .. 650 milliGRAM(s) Oral every 6 hours PRN Temp greater or equal to 38C (100.4F), Mild Pain (1 - 3), Moderate Pain (4 - 6)  bisacodyl Suppository 10 milliGRAM(s) Rectal daily PRN Constipation  glycopyrrolate Injectable 0.4 milliGRAM(s) IV Push every 4 hours PRN Secretions.  LORazepam   Injectable 0.2 milliGRAM(s) IV Push every 4 hours PRN Anxiety, Agitation, or intractable respiratory distress  morphine  - Injectable 1 milliGRAM(s) IV Push every 2 hours PRN Labored Breating      PRESENT SYMPTOMS: [x ]Unable to obtain due to poor mentation   Source if other than patient:  [ ]Family   [ ]Team     Pain: [ ]yes [ x]no  QOL impact -   Location -                    Aggravating factors -  Quality -  Radiation -  Timing-  Severity (0-10 scale):  Minimal acceptable level (0-10 scale):     CPOT:    https://www.[facility].org/getattachment/[email or web address]/Critical-Care-Pain-Observation-Tool-(CPOT)      PAIN AD Score: 3    http://geriatrictoolkit.[facility]/cog/painad.pdf (press ctrl +  left click to view)    Dyspnea:                           [ ]Mild [ ]Moderate [ ]Severe. Was not able to indicated its severity   Anxiety:                             [ ]Mild [ ]Moderate [ ]Severe  Fatigue:                             [ ]Mild [ ]Moderate [ ]Severe  Nausea:                             [ ]Mild [ ]Moderate [ ]Severe  Loss of appetite:              [ ]Mild [ ]Moderate [ ]Severe  Constipation:                    [ ]Mild [ ]Moderate [ ]Severe    Other Symptoms:  [ ]All other review of systems negative     Palliative Performance Status Version 2:   20-30      %    http://npcrc.org/files/news/palliative_performance_scale_ppsv2.pdf  PHYSICAL EXAM:  Vital Signs Last 24 Hrs  T(C): 36.8 (31 Mar 2021 05:07), Max: 37 (30 Mar 2021 21:15)  T(F): 98.2 (31 Mar 2021 05:07), Max: 98.6 (30 Mar 2021 21:15)  HR: 64 (31 Mar 2021 05:07) (64 - 68)  BP: 161/83 (31 Mar 2021 05:07) (156/65 - 161/83)  BP(mean): --  RR: 20 (31 Mar 2021 05:07) (20 - 20)  SpO2: 91% (31 Mar 2021 05:07) (91% - 95%)    GENERAL:  [x ]Alert  [x ]Oriented x 3  [ ]Lethargic  [ ]Cachexia  [ ]Unarousable  [x ]Verbal  [ ]Non-Verbal  Behavioral:   [ ] Anxiety  [ ] Delirium [ ] Agitation [x ] Other: Mild confusion   HEENT:  [x ]Normal   [ ]Dry mouth   [ ]ET Tube/Trach  [ ]Oral lesions  PULMONARY:   [ ]Clear [ ]Tachypnea  [ ]Audible excessive secretions   [ ]Rhonchi        [ ]Right [ ]Left [ ]Bilateral  [x ]Crackles        [ ]Right [x ]Left [ ]Bilateral  [ ]Wheezing     [ ]Right [ ]Left [ ]Bilateral  [x ]Diminished breath sounds [ ]right [ ]left [x ]bilateral    CARDIOVASCULAR:    [x ]Regular [ ]Irregular [ ]Tachy  [ ]Raghu [ ]Murmur [ ]Other  GASTROINTESTINAL:  [ ]Soft  [x ]Distended   [ ]+BS  [x ]Non tender [ ]Tender  [ ]PEG [ ]OGT/ NGT  Last BM: 3/31   GENITOURINARY:  [ ]Normal [x ] Incontinent   [ ]Oliguria/Anuria   [ ]Hui   MUSCULOSKELETAL:   [ ]Normal   [x ]Weakness  [ ]Bed/Wheelchair bound [ ]Edema  NEUROLOGIC:   [ ]No focal deficits  [ ]Cognitive impairment  [ ]Dysphagia [ ]Dysarthria [ ]Paresis [x ]Other: Confused.   SKIN:   [x ]Normal    [ ]Rash  [ ]Pressure ulcer(s)       Present on admission [ ]y [ ]n    CRITICAL CARE:  [ ] Shock Present  [ ]Septic [ ]Cardiogenic [ ]Neurologic [ ]Hypovolemic  [ ]  Vasopressors [ ]  Inotropes   [ ]Respiratory failure present [ ]Mechanical ventilation [ ]Non-invasive ventilatory support [ ]High flow    [ ]Acute  [ ]Chronic [ ]Hypoxic  [ ]Hypercarbic [ ]Other  [ ]Other organ failure     LABS:                   RADIOLOGY & ADDITIONAL STUDIES:                                            12.9   6.17  )-----------( 176      ( 30 Mar 2021 07:17 )             40.1   03-30    142  |  104  |  31<H>  ----------------------------<  227<H>  4.5   |  20<L>  |  0.69    Ca    9.5      30 Mar 2021 07:08      c< from: CT Abdomen and Pelvis w/ IV Cont (03.25.21 @ 19:14) >  EXAM:  CT ABDOMEN AND PELVIS IC                            PROCEDURE DATE:  03/25/2021  IMPRESSION:  Marked enlargement of pancreatic mass since 08/24/2020 consistent with carcinoma. Possible superimposed pancreatitis.  Left lower lobe opacity which may be infectious                  ANGELICA QUINTANILLA MD; Attending Radiologist  This document has been electronically signed. Mar 25 2021  7:29PMPANCREAS: Marked enlargement of previously demonstrated mass of the pancreatic body now measuring 4.9 x 3.1 cm with downstream pancreatic atrophy and dilatation of the pancreatic duct. Adjacent fat stranding which may represent superimposed pancreatitis. The mass abuts the lesser curvature of the stomach.    < end of copied text >    < from: Xray Chest 1 View- PORTABLE-Urgent (03.25.21 @ 17:55) >    FINDINGS:    Right glenohumeral joint arthrosis. The cardiomediastinal silhouette is unremarkable. The lungs are clear. There is no pleural effusion or pneumothorax.    IMPRESSION:    Clear lungs.  EXAM:  XR CHEST PORTABLE URGENT 1V                            PROCEDURE DATE:  03/25/2021      < end of copied text >  PROTEIN CALORIE MALNUTRITION PRESENT: [ ]mild [ ]moderate [ ]severe [ ]underweight [ ]morbid obesity  https://www.andeal.org/vault/2440/web/files/ONC/Table_Clinical%20Characteristics%20to%20Document%20Malnutrition-White%20JV%20et%20al%202012.pdf    Height (cm): 154.9 (03-25-21 @ 15:47), 154.94 (08-27-20 @ 12:56)  Weight (kg): 63.5 (03-25-21 @ 15:47), 65.5 (08-27-20 @ 12:56)  BMI (kg/m2): 26.5 (03-25-21 @ 15:47), 27.3 (08-27-20 @ 12:56)    [ ]PPSV2 < or = to 30% [ ]significant weight loss  [ ]poor nutritional intake  [ ]anasarca     Albumin, Serum: 3.8 g/dL (03-25-21 @ 17:30)   [ ]Artificial Nutrition      REFERRALS:   [ ]Chaplaincy  [ ]Hospice  [ ]Child Life  [ ]Social Work  [ ]Case management [ ]Holistic Therapy     Goals of Care Document:  HPI:  89F with PMHx of pancreatic adenocarcinoma (diagnosed Aug. 2020, but opting for comfort measures), HTN, T2DM, and hypothyroidism presents to [facility] with one week history of weakness, decrease PO intake, weight loss and constipation. Patient last admitted on Aug. 2020 when she was noted to have pancreatic mass and eventually found to have adenocarcinoma. [facility] discussion with son at that time revealed DNR/DNI and home hospice given her functional status was good and treatment would likely involve chemotherapy and/or whipple.     When seen in ED patient reiterated those complaints. Initially, found to have glucose of 400, AG of 22 and BHB. She was given 2 L NS and 6 units lispro and now AG is closed. She was incidentally found to be COVID-19 positive. She denies cough or shortness of breath. MICU saw patient and deemed not a candidate. Medication reconciliation reviews patient is only on Glipizide 4 mg daily.   (26 Mar 2021 04:52)    3/26 The patient was seen and examined. She was having labored breathing and c/o dyspnea that she believed had slightly improved. She denied pain. She was slightly confused. She was also c/o anorexia  and severe debility.   3/30 The patient was constantly moaning and indicating she was short of breath. She also appeared slightly labored breathing and tachypneic. As per nursing and as per her primary attending, the patient's appetite has been poor and eating only 10% of his meals.  She is now sleeping must of the time. She was only able to answer very simple questions and when complex questions were asked, she was just repeating what was questioned. As per nursing with decreased urinary output.   3/31 As per nursing with Morphine 0.5 mg IV moaning and labored breathing have improved.     PERTINENT PM/SXH:   Pancreatic adenocarcinoma    Hypothyroidism    HTN (hypertension)      H/O: hysterectomy      FAMILY HISTORY:  No pertinent family history in first degree relatives      ITEMS NOT CHECKED ARE NOT PRESENT    SOCIAL HISTORY:   Significant other/partner[ ]  Children[ ]  Episcopalian/Spirituality:  Substance hx:  [ ]   Tobacco hx:  [ ]   Alcohol hx: [ ]   Home Opioid hx:  [ ] I-Stop Reference No:  Living Situation: [x ]Home  [ ]Long term care  [ ]Rehab [ ]Other    ADVANCE DIRECTIVES:    DNR  MOLST  [ ]  Living Will  [ ]   DECISION MAKER(s):  [x ] Health Care Proxy(s)  [ ] Surrogate(s)  [ ] Guardian           Name(s): Phone Number(s): Anthony Arndt [phone]. HCP form available under alpha during her prior admission.     BASELINE (I)ADL(s) (prior to admission):  [place]: [ ]Total  [ ] Moderate [x ]Dependent    Allergies    No Known Allergies    Intolerances    MEDICATIONS  (STANDING):  dexAMETHasone     Tablet 6 milliGRAM(s) Oral daily  dextrose 40% Gel 15 Gram(s) Oral once  dextrose 5%. 1000 milliLiter(s) (50 mL/Hr) IV Continuous <Continuous>  dextrose 5%. 1000 milliLiter(s) (100 mL/Hr) IV Continuous <Continuous>  dextrose 50% Injectable 25 Gram(s) IV Push once  dextrose 50% Injectable 12.5 Gram(s) IV Push once  dextrose 50% Injectable 25 Gram(s) IV Push once  enoxaparin Injectable 40 milliGRAM(s) SubCutaneous daily  glucagon  Injectable 1 milliGRAM(s) IntraMuscular once  insulin glargine Injectable (LANTUS) 15 Unit(s) SubCutaneous <User Schedule>  insulin lispro (ADMELOG) corrective regimen sliding scale   SubCutaneous three times a day before meals  insulin lispro (ADMELOG) corrective regimen sliding scale   SubCutaneous at bedtime  insulin lispro Injectable (ADMELOG) 6 Unit(s) SubCutaneous three times a day before meals  lactated ringers. 1000 milliLiter(s) (100 mL/Hr) IV Continuous <Continuous>  levothyroxine 50 MICROGram(s) Oral daily  losartan 50 milliGRAM(s) Oral daily  mirtazapine 7.5 milliGRAM(s) Oral at bedtime  morphine  - Injectable 0.5 milliGRAM(s) IV Push every 4 hours  multivitamin/minerals 1 Tablet(s) Oral daily  polyethylene glycol 3350 17 Gram(s) Oral two times a day  senna 2 Tablet(s) Oral at bedtime  sodium chloride 0.9%. 1000 milliLiter(s) (50 mL/Hr) IV Continuous <Continuous>    MEDICATIONS  (PRN):  acetaminophen   Tablet .. 650 milliGRAM(s) Oral every 6 hours PRN Temp greater or equal to 38C (100.4F), Mild Pain (1 - 3), Moderate Pain (4 - 6)  bisacodyl Suppository 10 milliGRAM(s) Rectal daily PRN Constipation  glycopyrrolate Injectable 0.4 milliGRAM(s) IV Push every 4 hours PRN Secretions.  LORazepam   Injectable 0.2 milliGRAM(s) IV Push every 4 hours PRN Anxiety, Agitation, or intractable respiratory distress  morphine  - Injectable 1 milliGRAM(s) IV Push every 2 hours PRN Labored Breating      PRESENT SYMPTOMS: [x ]Unable to obtain due to poor mentation   Source if other than patient:  [ ]Family   [ ]Team     Pain: [ ]yes [ x]no  QOL impact -   Location -                    Aggravating factors -  Quality -  Radiation -  Timing-  Severity (0-10 scale):  Minimal acceptable level (0-10 scale):     CPOT:    https://www.[facility].org/getattachment/[email or web address]/Critical-Care-Pain-Observation-Tool-(CPOT)      PAIN AD Score: 3    http://geriatrictoolkit.[facility]/cog/painad.pdf (press ctrl +  left click to view)    Dyspnea:                           [ ]Mild [ ]Moderate [ ]Severe. Was not able to indicated its severity   Anxiety:                             [ ]Mild [ ]Moderate [ ]Severe  Fatigue:                             [ ]Mild [ ]Moderate [ ]Severe  Nausea:                             [ ]Mild [ ]Moderate [ ]Severe  Loss of appetite:              [ ]Mild [ ]Moderate [ ]Severe  Constipation:                    [ ]Mild [ ]Moderate [ ]Severe    Other Symptoms:  [ ]All other review of systems negative     Palliative Performance Status Version 2:   20-30      %    http://npcrc.org/files/news/palliative_performance_scale_ppsv2.pdf  PHYSICAL EXAM:  Vital Signs Last 24 Hrs  T(C): 36.8 (31 Mar 2021 05:07), Max: 37 (30 Mar 2021 21:15)  T(F): 98.2 (31 Mar 2021 05:07), Max: 98.6 (30 Mar 2021 21:15)  HR: 64 (31 Mar 2021 05:07) (64 - 68)  BP: 161/83 (31 Mar 2021 05:07) (156/65 - 161/83)  BP(mean): --  RR: 20 (31 Mar 2021 05:07) (20 - 20)  SpO2: 91% (31 Mar 2021 05:07) (91% - 95%)    GENERAL:  [x ]Alert  [x ]Oriented x 3  [ ]Lethargic  [ ]Cachexia  [ ]Unarousable  [x ]Verbal  [ ]Non-Verbal  Behavioral:   [ ] Anxiety  [ ] Delirium [ ] Agitation [x ] Other: Mild confusion   HEENT:  [x ]Normal   [ ]Dry mouth   [ ]ET Tube/Trach  [ ]Oral lesions  PULMONARY:   [ ]Clear [ ]Tachypnea  [ ]Audible excessive secretions   [ ]Rhonchi        [ ]Right [ ]Left [ ]Bilateral  [x ]Crackles        [ ]Right [x ]Left [ ]Bilateral  [ ]Wheezing     [ ]Right [ ]Left [ ]Bilateral  [x ]Diminished breath sounds [ ]right [ ]left [x ]bilateral    CARDIOVASCULAR:    [x ]Regular [ ]Irregular [ ]Tachy  [ ]Raghu [ ]Murmur [ ]Other  GASTROINTESTINAL:  [ ]Soft  [x ]Distended   [ ]+BS  [x ]Non tender [ ]Tender  [ ]PEG [ ]OGT/ NGT  Last BM: 3/31   GENITOURINARY:  [ ]Normal [x ] Incontinent   [ ]Oliguria/Anuria   [ ]Hui   MUSCULOSKELETAL:   [ ]Normal   [x ]Weakness  [ ]Bed/Wheelchair bound [ ]Edema  NEUROLOGIC:   [ ]No focal deficits  [ ]Cognitive impairment  [ ]Dysphagia [ ]Dysarthria [ ]Paresis [x ]Other: Confused.   SKIN:   [x ]Normal    [ ]Rash  [ ]Pressure ulcer(s)       Present on admission [ ]y [ ]n    CRITICAL CARE:  [ ] Shock Present  [ ]Septic [ ]Cardiogenic [ ]Neurologic [ ]Hypovolemic  [ ]  Vasopressors [ ]  Inotropes   [ ]Respiratory failure present [ ]Mechanical ventilation [ ]Non-invasive ventilatory support [ ]High flow    [ ]Acute  [ ]Chronic [ ]Hypoxic  [ ]Hypercarbic [ ]Other  [ ]Other organ failure     LABS:                   RADIOLOGY & ADDITIONAL STUDIES:                                            12.9   6.17  )-----------( 176      ( 30 Mar 2021 07:17 )             40.1   03-30    142  |  104  |  31<H>  ----------------------------<  227<H>  4.5   |  20<L>  |  0.69    Ca    9.5      30 Mar 2021 07:08      c< from: CT Abdomen and Pelvis w/ IV Cont (03.25.21 @ 19:14) >  EXAM:  CT ABDOMEN AND PELVIS IC                            PROCEDURE DATE:  03/25/2021  IMPRESSION:  Marked enlargement of pancreatic mass since 08/24/2020 consistent with carcinoma. Possible superimposed pancreatitis.  Left lower lobe opacity which may be infectious                  ANGELICA QUINTANILLA MD; Attending Radiologist  This document has been electronically signed. Mar 25 2021  7:29PMPANCREAS: Marked enlargement of previously demonstrated mass of the pancreatic body now measuring 4.9 x 3.1 cm with downstream pancreatic atrophy and dilatation of the pancreatic duct. Adjacent fat stranding which may represent superimposed pancreatitis. The mass abuts the lesser curvature of the stomach.    < end of copied text >    < from: Xray Chest 1 View- PORTABLE-Urgent (03.25.21 @ 17:55) >    FINDINGS:    Right glenohumeral joint arthrosis. The cardiomediastinal silhouette is unremarkable. The lungs are clear. There is no pleural effusion or pneumothorax.    IMPRESSION:    Clear lungs.  EXAM:  XR CHEST PORTABLE URGENT 1V                            PROCEDURE DATE:  03/25/2021      < end of copied text >  PROTEIN CALORIE MALNUTRITION PRESENT: [ ]mild [ ]moderate [ ]severe [ ]underweight [ ]morbid obesity  https://www.andeal.org/vault/2440/web/files/ONC/Table_Clinical%20Characteristics%20to%20Document%20Malnutrition-White%20JV%20et%20al%202012.pdf    Height (cm): 154.9 (03-25-21 @ 15:47), 154.94 (08-27-20 @ 12:56)  Weight (kg): 63.5 (03-25-21 @ 15:47), 65.5 (08-27-20 @ 12:56)  BMI (kg/m2): 26.5 (03-25-21 @ 15:47), 27.3 (08-27-20 @ 12:56)    [ ]PPSV2 < or = to 30% [ ]significant weight loss  [ ]poor nutritional intake  [ ]anasarca     Albumin, Serum: 3.8 g/dL (03-25-21 @ 17:30)   [ ]Artificial Nutrition      REFERRALS:   [ ]Chaplaincy  [ ]Hospice  [ ]Child Life  [ ]Social Work  [ ]Case management [ ]Holistic Therapy     Goals of Care Document:

## 2021-03-31 NOTE — PROGRESS NOTE ADULT - PROBLEM SELECTOR PROBLEM 2
Pancreatic adenocarcinoma
COVID-19
Pancreatic adenocarcinoma
COVID-19

## 2021-03-31 NOTE — PROGRESS NOTE ADULT - PROBLEM SELECTOR PLAN 1
Now with dyspnea and tachypnea. 2/2 to COVID-19   O2 by NC. On Dexa   Will start Morphine 0.5 mg IV q 4 ATC and 1mg IV q 2 PRN  Ativan 0.2 mg IV q 4 PRN for intractable symptoms.  Very poor prognosis based on Age, comorbid conditions, and anorexia.
-This may have occurred because enlarging pancreatic mass likely causing pancreatic insulin production/secretion dysfunction. In addition, her last A1c was 12+ indicating poor control already. HbA1c now 13.1.   -C/w Lantus 10 units daily and Lispro 3 units prior to meals, per discussion with endo.   -FS TID AC & Insulin sliding scale  -S/p IVF's.  -Will need to discuss with son GOC and possible basal-bolus regimen on discharge  -CC diet with Glucerna. -JERAD pagan.
-This may have occurred because enlarging pancreatic mass likely causing pancreatic insulin production/secretion dysfunction. In addition, her last A1c was 12+ indicating poor control already. HbA1c now 13.1.   -C/w Lantus 10 units daily and Lispro 3 units prior to meals,   - reconsult endocrine today as started on dexamethasone and poor po intake   -FS TID AC & Insulin sliding scale  -S/p IVF's.  -CC diet with Glucerna.
-This may have occurred because enlarging pancreatic mass likely causing pancreatic insulin production/secretion dysfunction. In addition, her last A1c was 12+ indicating poor control already. HbA1c now 13.1.   -C/w Lantus 10 units daily and Lispro 3 units prior to meals, per discussion with endo.   -FS TID AC & Insulin sliding scale  -S/p IVF's.  -Will need to discuss with son GOC and possible basal-bolus regimen on discharge  -CC diet with Glucerna. -JERAD pagan.
-This may have occurred because enlarging pancreatic mass likely causing pancreatic insulin production/secretion dysfunction. In addition, her last A1c was 12+ indicating poor control already. HbA1c now 13.1.   -C/w Lantus 10 units daily and Lispro 3 units prior to meals,   -endocrine consult appreciated    -FS TID AC & Insulin sliding scale  -CC diet with Glucerna.
-This may have occurred because enlarging pancreatic mass likely causing pancreatic insulin production/secretion dysfunction. In addition, her last A1c was 12+ indicating poor control already. HbA1c now 13.1.   -C/w Lantus 10 units daily and Lispro 3 units prior to meals,   - reconsult endocrine today as started on dexamethasone and poor po intake   -FS TID AC & Insulin sliding scale  - NS 50ml/hr   -CC diet with Glucerna.
-This may have occurred because enlarging pancreatic mass likely causing pancreatic insulin production/secretion dysfunction. In addition, her last A1c was 12+ indicating poor control already. HbA1c now 13.1.   -C/w Lantus 10 units daily and Lispro 3 units prior to meals, per discussion with endo.   -FS TID AC & Insulin sliding scale  -S/p IVF's.  -Will need to discuss with son GOC and possible basal-bolus regimen on discharge  -CC diet with Glucerna. -JERAD pagan.
With dyspnea and tachypnea. 2/2 to COVID-19. Better controlled today.   O2 by NC. On Dexa   Will continue Morphine 0.5 mg IV q 4 ATC and 1mg IV q 2 PRN  Ativan 0.2 mg IV q 4 PRN for intractable symptoms.  Very poor prognosis based on Age, comorbid conditions, and anorexia.

## 2021-03-31 NOTE — PROGRESS NOTE ADULT - SUBJECTIVE AND OBJECTIVE BOX
Patient is a 89y old  Female who presents with a chief complaint of Poor appetite (30 Mar 2021 16:27)      SUBJECTIVE / OVERNIGHT EVENTS: Patient seen and examined at bedside. States that she feel SOB, denies any CP, abd pain and n/v. She still has a poor appetite. No acute events overnight.      ROS:  All other review of systems negative    Allergies    No Known Allergies    Intolerances        MEDICATIONS  (STANDING):  dexAMETHasone     Tablet 6 milliGRAM(s) Oral daily  dextrose 40% Gel 15 Gram(s) Oral once  dextrose 5%. 1000 milliLiter(s) (50 mL/Hr) IV Continuous <Continuous>  dextrose 5%. 1000 milliLiter(s) (100 mL/Hr) IV Continuous <Continuous>  dextrose 50% Injectable 25 Gram(s) IV Push once  dextrose 50% Injectable 12.5 Gram(s) IV Push once  dextrose 50% Injectable 25 Gram(s) IV Push once  enoxaparin Injectable 40 milliGRAM(s) SubCutaneous daily  glucagon  Injectable 1 milliGRAM(s) IntraMuscular once  insulin glargine Injectable (LANTUS) 15 Unit(s) SubCutaneous <User Schedule>  insulin lispro (ADMELOG) corrective regimen sliding scale   SubCutaneous three times a day before meals  insulin lispro (ADMELOG) corrective regimen sliding scale   SubCutaneous at bedtime  insulin lispro Injectable (ADMELOG) 3 Unit(s) SubCutaneous three times a day before meals  insulin lispro Injectable (ADMELOG) 6 Unit(s) SubCutaneous three times a day before meals  lactated ringers. 1000 milliLiter(s) (100 mL/Hr) IV Continuous <Continuous>  levothyroxine 50 MICROGram(s) Oral daily  losartan 50 milliGRAM(s) Oral daily  mirtazapine 7.5 milliGRAM(s) Oral at bedtime  morphine  - Injectable 0.5 milliGRAM(s) IV Push every 4 hours  multivitamin/minerals 1 Tablet(s) Oral daily  polyethylene glycol 3350 17 Gram(s) Oral two times a day  senna 2 Tablet(s) Oral at bedtime  sodium chloride 0.9%. 1000 milliLiter(s) (50 mL/Hr) IV Continuous <Continuous>    MEDICATIONS  (PRN):  acetaminophen   Tablet .. 650 milliGRAM(s) Oral every 6 hours PRN Temp greater or equal to 38C (100.4F), Mild Pain (1 - 3), Moderate Pain (4 - 6)  bisacodyl Suppository 10 milliGRAM(s) Rectal daily PRN Constipation  glycopyrrolate Injectable 0.4 milliGRAM(s) IV Push every 4 hours PRN Secretions.  LORazepam   Injectable 0.2 milliGRAM(s) IV Push every 4 hours PRN Anxiety, Agitation, or intractable respiratory distress  morphine  - Injectable 1 milliGRAM(s) IV Push every 2 hours PRN Labored Breating      Vital Signs Last 24 Hrs  T(C): 36.8 (31 Mar 2021 05:07), Max: 37 (30 Mar 2021 21:15)  T(F): 98.2 (31 Mar 2021 05:07), Max: 98.6 (30 Mar 2021 21:15)  HR: 64 (31 Mar 2021 05:07) (64 - 70)  BP: 161/83 (31 Mar 2021 05:07) (139/80 - 161/83)  BP(mean): --  RR: 20 (31 Mar 2021 05:07) (18 - 20)  SpO2: 91% (31 Mar 2021 05:07) (91% - 95%)  CAPILLARY BLOOD GLUCOSE      POCT Blood Glucose.: 180 mg/dL (31 Mar 2021 08:30)  POCT Blood Glucose.: 190 mg/dL (30 Mar 2021 21:07)  POCT Blood Glucose.: 182 mg/dL (30 Mar 2021 18:14)  POCT Blood Glucose.: 202 mg/dL (30 Mar 2021 16:39)    I&O's Summary    30 Mar 2021 07:01  -  31 Mar 2021 07:00  --------------------------------------------------------  IN: 990 mL / OUT: 0 mL / NET: 990 mL        PHYSICAL EXAM:  GENERAL: NAD, well-developed+3L NC  HEAD:  Atraumatic, Normocephalic  EYES: EOMI, PERRLA, conjunctiva and sclera clear  NECK: Supple, No JVD  CHEST/LUNG: Clear to auscultation bilaterally; No wheeze  HEART: Regular rate and rhythm; No murmurs, rubs, or gallops  ABDOMEN: Soft, Nontender, Nondistended; Bowel sounds present  EXTREMITIES:  2+ Peripheral Pulses, No clubbing, cyanosis, or edema  NEUROLOGY: AAOx2 (self and place) , non-focal  PSYCH: calm  SKIN: No rashes or lesions    LABS:                        12.9   6.17  )-----------( 176      ( 30 Mar 2021 07:17 )             40.1     03-30    142  |  104  |  31<H>  ----------------------------<  227<H>  4.5   |  20<L>  |  0.69    Ca    9.5      30 Mar 2021 07:08                RADIOLOGY & ADDITIONAL TESTS:    Care Discussed with Consultants/Other Providers: Medicine NP and Dr. Sage  Patient is a 89y old  Female who presents with a chief complaint of Poor appetite (30 Mar 2021 16:27)    Dr. Amberly Mazariegos   Division of Hospital Medicine  [facility]   Pager: [phone]     SUBJECTIVE / OVERNIGHT EVENTS: Patient seen and examined at bedside. States that she feel SOB, denies any CP, abd pain and n/v. She still has a poor appetite. No acute events overnight.      ROS:  All other review of systems negative    Allergies    No Known Allergies    Intolerances        MEDICATIONS  (STANDING):  dexAMETHasone     Tablet 6 milliGRAM(s) Oral daily  dextrose 40% Gel 15 Gram(s) Oral once  dextrose 5%. 1000 milliLiter(s) (50 mL/Hr) IV Continuous <Continuous>  dextrose 5%. 1000 milliLiter(s) (100 mL/Hr) IV Continuous <Continuous>  dextrose 50% Injectable 25 Gram(s) IV Push once  dextrose 50% Injectable 12.5 Gram(s) IV Push once  dextrose 50% Injectable 25 Gram(s) IV Push once  enoxaparin Injectable 40 milliGRAM(s) SubCutaneous daily  glucagon  Injectable 1 milliGRAM(s) IntraMuscular once  insulin glargine Injectable (LANTUS) 15 Unit(s) SubCutaneous <User Schedule>  insulin lispro (ADMELOG) corrective regimen sliding scale   SubCutaneous three times a day before meals  insulin lispro (ADMELOG) corrective regimen sliding scale   SubCutaneous at bedtime  insulin lispro Injectable (ADMELOG) 3 Unit(s) SubCutaneous three times a day before meals  insulin lispro Injectable (ADMELOG) 6 Unit(s) SubCutaneous three times a day before meals  lactated ringers. 1000 milliLiter(s) (100 mL/Hr) IV Continuous <Continuous>  levothyroxine 50 MICROGram(s) Oral daily  losartan 50 milliGRAM(s) Oral daily  mirtazapine 7.5 milliGRAM(s) Oral at bedtime  morphine  - Injectable 0.5 milliGRAM(s) IV Push every 4 hours  multivitamin/minerals 1 Tablet(s) Oral daily  polyethylene glycol 3350 17 Gram(s) Oral two times a day  senna 2 Tablet(s) Oral at bedtime  sodium chloride 0.9%. 1000 milliLiter(s) (50 mL/Hr) IV Continuous <Continuous>    MEDICATIONS  (PRN):  acetaminophen   Tablet .. 650 milliGRAM(s) Oral every 6 hours PRN Temp greater or equal to 38C (100.4F), Mild Pain (1 - 3), Moderate Pain (4 - 6)  bisacodyl Suppository 10 milliGRAM(s) Rectal daily PRN Constipation  glycopyrrolate Injectable 0.4 milliGRAM(s) IV Push every 4 hours PRN Secretions.  LORazepam   Injectable 0.2 milliGRAM(s) IV Push every 4 hours PRN Anxiety, Agitation, or intractable respiratory distress  morphine  - Injectable 1 milliGRAM(s) IV Push every 2 hours PRN Labored Breating      Vital Signs Last 24 Hrs  T(C): 36.8 (31 Mar 2021 05:07), Max: 37 (30 Mar 2021 21:15)  T(F): 98.2 (31 Mar 2021 05:07), Max: 98.6 (30 Mar 2021 21:15)  HR: 64 (31 Mar 2021 05:07) (64 - 70)  BP: 161/83 (31 Mar 2021 05:07) (139/80 - 161/83)  BP(mean): --  RR: 20 (31 Mar 2021 05:07) (18 - 20)  SpO2: 91% (31 Mar 2021 05:07) (91% - 95%)  CAPILLARY BLOOD GLUCOSE      POCT Blood Glucose.: 180 mg/dL (31 Mar 2021 08:30)  POCT Blood Glucose.: 190 mg/dL (30 Mar 2021 21:07)  POCT Blood Glucose.: 182 mg/dL (30 Mar 2021 18:14)  POCT Blood Glucose.: 202 mg/dL (30 Mar 2021 16:39)    I&O's Summary    30 Mar 2021 07:01  -  31 Mar 2021 07:00  --------------------------------------------------------  IN: 990 mL / OUT: 0 mL / NET: 990 mL        PHYSICAL EXAM:  GENERAL: NAD, well-developed+3L NC  HEAD:  Atraumatic, Normocephalic  EYES: EOMI, PERRLA, conjunctiva and sclera clear  NECK: Supple, No JVD  CHEST/LUNG: Clear to auscultation bilaterally; No wheeze  HEART: Regular rate and rhythm; No murmurs, rubs, or gallops  ABDOMEN: Soft, Nontender, Nondistended; Bowel sounds present  EXTREMITIES:  2+ Peripheral Pulses, No clubbing, cyanosis, or edema  NEUROLOGY: AAOx2 (self and place) , non-focal  PSYCH: calm  SKIN: No rashes or lesions    LABS:                        12.9   6.17  )-----------( 176      ( 30 Mar 2021 07:17 )             40.1     03-30    142  |  104  |  31<H>  ----------------------------<  227<H>  4.5   |  20<L>  |  0.69    Ca    9.5      30 Mar 2021 07:08                RADIOLOGY & ADDITIONAL TESTS:    Care Discussed with Consultants/Other Providers: Medicine NP and Dr. Sage     Updated son max

## 2021-03-31 NOTE — PROGRESS NOTE ADULT - PROBLEM SELECTOR PLAN 2
Not a candidate for DMTs
-satting well on 3L NC this morning, She continues to have poor appetite.   - start dexamethasone 6 mg day 3/10 (son in agreement) hold off on started remdesivir for now  - monitor Spo2 on 2L NC, keep SPo2 >93%  - Endo reconsulted   -Trend inflammatory markers  -DVT PPx with Lovenox.   -Supportive care.
Not a candidate for DMTs
-She seems mostly asymptomatic, though the above symptoms could be from COVID. She is not hypoxic. She has poor appetite.   -Trend inflammatory markers  -Defer Remdesivir & Dexamethasone since not hypoxic.   -DVT PPx with Lovenox.   -Supportive care.
-hypoxic 89% yesterday and 90% this morning on RA. She has poor appetite.   - start dexamethasone 6 mg day 1/10 (son in agreement) hold off on started remdesivir for now  - monitor Spo2 on 2L NC, keep SPo2 >93%  - Endo reconsulted   -Trend inflammatory markers  -DVT PPx with Lovenox.   -Supportive care.
-She seems mostly asymptomatic, though the above symptoms could be from COVID. She is mostly not hypoxic. She has poor appetite.   -Trend inflammatory markers  -Defer Remdesivir & Dexamethasone since not hypoxic.   -DVT PPx with Lovenox.   -Supportive care.
-She seems mostly asymptomatic, though the above symptoms could be from COVID. She is not hypoxic. She has poor appetite.   -Trend inflammatory markers  -Defer Remdesivir & Dexamethasone since not hypoxic.   -DVT PPx with Lovenox.   -Supportive care.
-satting well on 2L NC this morning, She continues to have poor appetite.   - start dexamethasone 6 mg day 2/10 (son in agreement) hold off on started remdesivir for now  - monitor Spo2 on 2L NC, keep SPo2 >93%  - Endo reconsulted   -Trend inflammatory markers  -DVT PPx with Lovenox.   -Supportive care.

## 2021-03-31 NOTE — DISCHARGE NOTE PROVIDER - DETAILS OF MALNUTRITION DIAGNOSIS/DIAGNOSES
This patient has been assessed with a concern for Malnutrition and was treated during this hospitalization for the following Nutrition diagnosis/diagnoses:     -  03/27/2021: Moderate protein-calorie malnutrition

## 2021-03-31 NOTE — PROGRESS NOTE ADULT - PROBLEM SELECTOR PROBLEM 3
Pancreatic adenocarcinoma
COVID-19
Pancreatic adenocarcinoma
COVID-19
Pancreatic adenocarcinoma

## 2021-03-31 NOTE — PROGRESS NOTE ADULT - PROVIDER SPECIALTY LIST ADULT
Hospitalist
Palliative Care
Hospitalist
Hospitalist
Palliative Care

## 2021-03-31 NOTE — PROGRESS NOTE ADULT - ASSESSMENT
89F with of pancreatic adenocarcinoma (diagnosed Aug. 2020, but opting for comfort measures), HTN, T2DM, and hypothyroidism presents to [facility] with one week history of weakness, decrease PO intake, weight loss and constipation. Patient last admitted on Aug. 2020 when she was noted to have pancreatic mass and eventually found to have adenocarcinoma. GOC discussion with son at that time revealed DNR/DNI and home hospice given her functional status was good and treatment would likely involve chemotherapy and/or whipple. When seen in ED patient reiterated those complaints. Initially, found to have glucose of 400, AG of 22 and BHB. She was given 2 L NS and 6 units lispro and now AG is closed. She was incidentally found to be COVID-19 positive. She denies cough or shortness of breath. MICU saw patient and deemed not a candidate. Medication reconciliation reviews patient is only on Glipizide 4 mg daily. Palliative care was called for GOC and inpatient hospice eval.
89F with PMHx of pancreatic adenocarcinoma (diagnosed Aug. 2020, but opting for comfort measures), HTN, T2DM, and hypothyroidism presents to [facility] with one week history of weakness, decrease PO intake, weight loss and constipation. Patient found to be hyperglycemic with ketones and anion gap which have resolved. CT A&P showing enlarged known pancreatic mass that is adenocarcinoma. She was incidentally found to be COVID-19 positive. Symptoms likely secondary to hyperglycemia, progression of pancreatic cancer, and possibly non-specific COVID symptoms. 
89F with PMHx of pancreatic adenocarcinoma (diagnosed Aug. 2020, but opting for comfort measures), HTN, T2DM, and hypothyroidism presents to [facility] with one week history of weakness, decrease PO intake, weight loss and constipation. Patient found to be hyperglycemic with ketones and anion gap which have resolved. CT A&P showing enlarged known pancreatic mass that is adenocarcinoma. She was incidentally found to be COVID-19 positive. Symptoms likely secondary to hyperglycemia, progression of pancreatic cancer, and possibly non-specific COVID symptoms. 
89F with PMHx of pancreatic adenocarcinoma (diagnosed Aug. 2020, but opting for comfort measures), HTN, T2DM, and hypothyroidism presents to [facility] with one week history of weakness, decrease PO intake, weight loss and constipation. Patient found to be hyperglycemic with ketones and anion gap which have resolved. CT A&P showing enlarged known pancreatic mass that is adenocarcinoma. She was incidentally found to be COVID-19 positive. Symptoms likely secondary to hyperglycemia, progression of pancreatic cancer, and possibly non-specific COVID symptoms. 
89F with of pancreatic adenocarcinoma (diagnosed Aug. 2020, but opting for comfort measures), HTN, T2DM, and hypothyroidism presents to [facility] with one week history of weakness, decrease PO intake, weight loss and constipation. Patient last admitted on Aug. 2020 when she was noted to have pancreatic mass and eventually found to have adenocarcinoma. GOC discussion with son at that time revealed DNR/DNI and home hospice given her functional status was good and treatment would likely involve chemotherapy and/or whipple. When seen in ED patient reiterated those complaints. Initially, found to have glucose of 400, AG of 22 and BHB. She was given 2 L NS and 6 units lispro and now AG is closed. She was incidentally found to be COVID-19 positive. She denies cough or shortness of breath. MICU saw patient and deemed not a candidate. Medication reconciliation reviews patient is only on Glipizide 4 mg daily. Palliative care was called for GOC and inpatient hospice eval.
89F with PMHx of pancreatic adenocarcinoma (diagnosed Aug. 2020, but opting for comfort measures), HTN, T2DM, and hypothyroidism presents to [facility] with one week history of weakness, decrease PO intake, weight loss and constipation. Patient found to be hyperglycemic with ketones and anion gap which have resolved. CT A&P showing enlarged known pancreatic mass that is adenocarcinoma. She was incidentally found to be COVID-19 positive. Symptoms likely secondary to hyperglycemia, progression of pancreatic cancer, and possibly non-specific COVID symptoms. 
89F with PMHx of pancreatic adenocarcinoma (diagnosed Aug. 2020, but opting for comfort measures), HTN, T2DM, and hypothyroidism presents to [facility] with one week history of weakness, decrease PO intake, weight loss and constipation. Patient found to be hyperglycemic with ketones and anion gap which have resolved. CT A&P showing enlarged known pancreatic mass that is adenocarcinoma. She was incidentally found to be COVID-19 positive. Symptoms likely secondary to hyperglycemia, progression of pancreatic cancer, and possibly non-specific COVID symptoms. 
89F with PMHx of pancreatic adenocarcinoma (diagnosed Aug. 2020, but opting for comfort measures), HTN, T2DM, and hypothyroidism presents to [facility] with one week history of weakness, decrease PO intake, weight loss and constipation. Patient found to be hyperglycemic with ketones and anion gap which have resolved. CT A&P showing enlarged known pancreatic mass that is adenocarcinoma. She was incidentally found to be COVID-19 positive. Symptoms likely secondary to hyperglycemia, progression of pancreatic cancer, and possibly non-specific COVID symptoms.

## 2021-03-31 NOTE — DISCHARGE NOTE PROVIDER - CARE PROVIDER_API CALL
Génesis Krause  [id number]  [address] [place] [address]  Phone: [phone]  Fax: [phone]  Established Patient  Follow Up Time:

## 2021-03-31 NOTE — DISCHARGE NOTE PROVIDER - HOSPITAL COURSE
To be done. 89F with PMHx of pancreatic adenocarcinoma (diagnosed Aug. 2020, but opting for comfort measures), HTN, T2DM, and hypothyroidism presents to [facility] with one week history of weakness, decrease PO intake, weight loss and constipation. Patient found to be hyperglycemic with ketones and anion gap which have resolved. CT A&P showing enlarged known pancreatic mass that is adenocarcinoma. She was incidentally found to be COVID-19 positive. Symptoms likely secondary to hyperglycemia, progression of pancreatic cancer, and possibly non-specific COVID symptoms.   89F with PMHx of pancreatic adenocarcinoma (diagnosed Aug. 2020, but opting for comfort measures), HTN, T2DM, and hypothyroidism presents to [facility] with one week history of weakness, decrease PO intake, weight loss and constipation. Patient found to be hyperglycemic with ketones and anion gap which have resolved. CT A&P showing enlarged known pancreatic mass that is adenocarcinoma. She was incidentally found to be COVID-19 positive. Symptoms likely secondary to hyperglycemia, progression of pancreatic cancer, and possibly non-specific COVID symptoms.    COVID-19.  Plan: -satting well on 3L NC this morning, She continues to have poor appetite. Started dexamethasone 6 mg day 3/10 (son in agreement) hold off on started remdesivir for now.  SpO2 >93% on 2 L/NC.     Pancreatic adenocarcinoma.  Plan: Known from August 2020 and patient opting for conservative mgmt. The mass is now bigger and in fact may be ?causing her more B symptoms. Son in agreement with DNR/DNI.  Pt seen by Palliative Care.  Son is in agreement for pt to transition to [facility] for supportive care.  Mirtazapine 7.5mg qhs added to help with appetite.  Pt is hemodynamically stable for discharge to hospice care. 89F with PMHx of pancreatic adenocarcinoma (diagnosed Aug. 2020, but opting for comfort measures), HTN, T2DM, and hypothyroidism presents to [facility] with one week history of weakness, decrease PO intake, weight loss and constipation. Patient found to be hyperglycemic with ketones and anion gap which have resolved. CT A&P showing enlarged known pancreatic mass that is adenocarcinoma. She was incidentally found to be COVID-19 positive. Symptoms likely secondary to hyperglycemia, progression of pancreatic cancer, and COVID symptoms.     COVID-19.   - Patient noted to have hypoxia and SOB, started on dexamethasone   - SpO2 >93% on 2 L/NC.   - satting well on 3L NC    FTT/ pancreatic adenocarcinoma   - She continues to have poor appetite.     - Known from August 2020 and patient opting for conservative mgmt. The mass is now bigger and in fact may be ?causing her more B symptoms.   -Son in agreement with DNR/DNI.    -Pt seen by Palliative Care.  Son is in agreement for [facility] symptomatic management.    - Mirtazapine 7.5mg qhs added to help with appetite.    Pt is hemodynamically stable for discharge to inpatient hospice care.

## 2021-03-31 NOTE — HOSPICE CARE NOTE - CONVESATION DETAILS
Pt re-evaluated, and does not currently meet hospice inpatient criteria.    TC w/the Pt's Son Hair Henry for update. Son would still prefer the Pt to be Inpatient hospice, but is understanding of the current status.  Pt to be evaluated for home hospice. Medical review pending.    HCN RN will continue to follow.
TC with the Pt's son Hair Henry - discussed H[facility] services/POC. Pt is not currently Inpatient hospice appropriate.   HCN RN will re-eval the Pt on Monday. Son in agreement, and also be amenable with home hospice.    HCN RN will continue to follow.
HCN Consents have been received, and forwarded to the [facility].  TC w/CM Kimi Berumen Pt is cleared for d/c today, to the [facility].  Transport for 2p today.  Anthony Henry has been informed, and is in agreement.    HCN RN will continue to follow, up to d/c.    
TC received from Palliative Dr. Chu Smith - Pt has been approved for admit the [facility].    TC w/Pt's Son Hair Henry regarding pending d/c to the [facility] for Inpatient hospice care. Son is in agreement.  HCN Consents have been reviewed/completed with Hair Henry. Pending receipt.    Dr. Chu Smith, and David Douglass, NP/[facility], apprised of Referral status.    The current plan is for d/c to the [facility], pending receipt of completed Consents, medical clearance, and bed availability.    HCN RN will continue to follow.

## 2021-03-31 NOTE — PROGRESS NOTE ADULT - REASON FOR ADMISSION
Poor appetite

## 2021-03-31 NOTE — PROGRESS NOTE ADULT - PROBLEM SELECTOR PLAN 3
On Dexa and O2
Known from August 2020 and patient opting for conservative mgmt. The mass is now bigger and in fact may be ?causing her more B symptoms. Will need to continue to have GOC discussion with son Hair. It seems she was made DNR/DNI and discharged with home hospice referral last admission, but she never was started on home hospice.  -F/u palliative eval and hospice eval.  -Palliative appreciated; patient DNR/DNI. Unable to get accepted into inpatient hospice at this time.  -Mirtazapine 7.5mg qhs added to help with appetite.  -PT eval.
Known from August 2020 and patient opting for conservative mgmt. The mass is now bigger and in fact may be ?causing her more B symptoms. Will need to continue to have GOC discussion with son Hair. It seems she was made DNR/DNI and discharged with home hospice referral last admission, but she never was started on home hospice.  -F/u palliative eval and hospice eval.  -May need PT eval depending on GOC and dispo plan.
Known from August 2020 and patient opting for conservative mgmt. The mass is now bigger and in fact may be ?causing her more B symptoms. Son in agreement with DNR/DNI   - d/w Dr. Smith Patient to be d/c to hospice [facility] today, medically stable for d/c  - Son aware and in agreement with this plan   -Mirtazapine 7.5mg qhs added to help with appetite.
On Dexa and O2
Known from August 2020 and patient opting for conservative mgmt. The mass is now bigger and in fact may be ?causing her more B symptoms. Son in agreement with DNR/DNI   - Per hospice patient is not a candidate for inpatient hospice   - Son has refused home hospice, is agreeable for [facility]  -Mirtazapine 7.5mg qhs added to help with appetite.  -PT eval: [facility]
Known from August 2020 and patient opting for conservative mgmt. The mass is now bigger and in fact may be ?causing her more B symptoms. Son in agreement with DNR/DNI   - d/w Dr. Smith will reeval for poss inpatient hospice again today 3/30  - Son has refused home hospice, is agreeable for [facility], pending above  -Mirtazapine 7.5mg qhs added to help with appetite.  -PT eval: [facility]
Known from August 2020 and patient opting for conservative mgmt. The mass is now bigger and in fact may be ?causing her more B symptoms. Will need to continue to have GOC discussion with son Hair. It seems she was made DNR/DNI and discharged with home hospice referral last admission, but she never was started on home hospice.  -F/u palliative eval and hospice eval.  -Palliative appreciated; patient DNR/DNI. Unable to get accepted into inpatient hospice at this time.  -Mirtazapine 7.5mg qhs added to help with appetite.  -PT eval.

## 2021-03-31 NOTE — PROGRESS NOTE ADULT - NUTRITIONAL ASSESSMENT
This patient has been assessed with a concern for Malnutrition and has been determined to have a diagnosis/diagnoses of Moderate protein-calorie malnutrition.    This patient is being managed with:   Diet Regular-  Consistent Carbohydrate {Evening Snack} (CSTCHOSN)  Supplement Feeding Modality:  Oral  Glucerna Shake Cans or Servings Per Day:  1       Frequency:  Two Times a day  Entered: Mar 26 2021 11:19AM    
This patient has been assessed with a concern for Malnutrition and has been determined to have a diagnosis/diagnoses of Moderate protein-calorie malnutrition.    This patient is being managed with:   Diet Regular-  Consistent Carbohydrate {Evening Snack} (CSTCHOSN)  Supplement Feeding Modality:  Oral  Glucerna Shake Cans or Servings Per Day:  1       Frequency:  Two Times a day  Entered: Mar 26 2021 11:19AM    
This patient has been assessed with a concern for Malnutrition and has been determined to have a diagnosis/diagnoses of Moderate protein-calorie malnutrition.    This patient is being managed with:   Diet Regular-  Consistent Carbohydrate {Evening Snack} (CSTCHOSN)  Supplement Feeding Modality:  Oral  Ensure Clear Cans or Servings Per Day:  3       Frequency:  Daily  Entered: Mar 30 2021  2:10PM    
This patient has been assessed with a concern for Malnutrition and has been determined to have a diagnosis/diagnoses of Moderate protein-calorie malnutrition.    This patient is being managed with:   Diet Regular-  Consistent Carbohydrate {Evening Snack} (CSTCHOSN)  Supplement Feeding Modality:  Oral  Glucerna Shake Cans or Servings Per Day:  1       Frequency:  Two Times a day  Entered: Mar 26 2021 11:19AM    
This patient has been assessed with a concern for Malnutrition and has been determined to have a diagnosis/diagnoses of Moderate protein-calorie malnutrition.    This patient is being managed with:   Diet Regular-  Consistent Carbohydrate {Evening Snack} (CSTCHOSN)  Supplement Feeding Modality:  Oral  Glucerna Shake Cans or Servings Per Day:  1       Frequency:  Two Times a day  Entered: Mar 26 2021 11:19AM

## 2021-11-26 NOTE — ED ADULT TRIAGE NOTE - ARRIVAL FROM
[facility]; PhD)  Neurology; Vascular Neurology  [address] [place] [address]  Phone: [phone]  Fax: [phone]  Follow Up Time:

## 2022-03-31 NOTE — PHYSICAL THERAPY INITIAL EVALUATION ADULT - BED MOBILITY TRAINING, PT EVAL
Detail Level: Generalized Detail Level: Detailed Detail Level: Simple Goal: Patient will be able to complete all bed mobility independently in 3 weeks.

## 2023-01-19 NOTE — PHYSICAL THERAPY INITIAL EVALUATION ADULT - FOLLOWS COMMANDS/ANSWERS QUESTIONS, REHAB EVAL
What Type Of Note Output Would You Prefer (Optional)?: Standard Output How Severe Are Your Spot(S)?: mild Have Your Spot(S) Been Treated In The Past?: has not been treated Hpi Title: Evaluation of Skin Lesions 75% of the time

## 2023-11-10 NOTE — H&P ADULT - GASTROINTESTINAL
Marguerite says she is nauseous and needs more phenergan sent to the pharmacy. Is this ok?   Soft, non-tender, no hepatosplenomegaly, normal bowel sounds